# Patient Record
Sex: FEMALE | Race: WHITE | NOT HISPANIC OR LATINO | Employment: OTHER | URBAN - METROPOLITAN AREA
[De-identification: names, ages, dates, MRNs, and addresses within clinical notes are randomized per-mention and may not be internally consistent; named-entity substitution may affect disease eponyms.]

---

## 2017-05-02 ENCOUNTER — AMBULATORY - HEALTHEAST (OUTPATIENT)
Dept: INFUSION THERAPY | Facility: HOSPITAL | Age: 62
End: 2017-05-02

## 2017-05-02 ENCOUNTER — OFFICE VISIT - HEALTHEAST (OUTPATIENT)
Dept: ONCOLOGY | Facility: HOSPITAL | Age: 62
End: 2017-05-02

## 2017-05-02 DIAGNOSIS — C50.919 BREAST CANCER (H): ICD-10-CM

## 2017-05-02 ASSESSMENT — MIFFLIN-ST. JEOR: SCORE: 1241.12

## 2017-06-27 ENCOUNTER — RECORDS - HEALTHEAST (OUTPATIENT)
Dept: ADMINISTRATIVE | Facility: OTHER | Age: 62
End: 2017-06-27

## 2017-07-05 ENCOUNTER — RECORDS - HEALTHEAST (OUTPATIENT)
Dept: ADMINISTRATIVE | Facility: OTHER | Age: 62
End: 2017-07-05

## 2017-07-07 ENCOUNTER — RECORDS - HEALTHEAST (OUTPATIENT)
Dept: ADMINISTRATIVE | Facility: OTHER | Age: 62
End: 2017-07-07

## 2017-07-07 LAB
LAB AP CHARGES (HE HISTORICAL CONVERSION): ABNORMAL
PATH REPORT.ADDENDUM SPEC: ABNORMAL
PATH REPORT.COMMENTS IMP SPEC: ABNORMAL
PATH REPORT.COMMENTS IMP SPEC: ABNORMAL
PATH REPORT.FINAL DX SPEC: ABNORMAL
PATH REPORT.GROSS SPEC: ABNORMAL
PATH REPORT.MICROSCOPIC SPEC OTHER STN: ABNORMAL
PATH REPORT.MICROSCOPIC SPEC OTHER STN: ABNORMAL
PATH REPORT.RELEVANT HX SPEC: ABNORMAL
RESULT FLAG (HE HISTORICAL CONVERSION): ABNORMAL

## 2017-07-13 ENCOUNTER — RECORDS - HEALTHEAST (OUTPATIENT)
Dept: ADMINISTRATIVE | Facility: OTHER | Age: 62
End: 2017-07-13

## 2017-07-13 ENCOUNTER — RECORDS - HEALTHEAST (OUTPATIENT)
Dept: LAB | Facility: CLINIC | Age: 62
End: 2017-07-13

## 2017-07-13 ENCOUNTER — COMMUNICATION - HEALTHEAST (OUTPATIENT)
Dept: ONCOLOGY | Facility: HOSPITAL | Age: 62
End: 2017-07-13

## 2017-07-13 DIAGNOSIS — C50.911 BREAST CANCER, RIGHT (H): ICD-10-CM

## 2017-07-13 LAB
CHOLEST SERPL-MCNC: 166 MG/DL
FASTING STATUS PATIENT QL REPORTED: NORMAL
HDLC SERPL-MCNC: 55 MG/DL
LDLC SERPL CALC-MCNC: 96 MG/DL
TRIGL SERPL-MCNC: 77 MG/DL

## 2017-07-14 ENCOUNTER — OFFICE VISIT - HEALTHEAST (OUTPATIENT)
Dept: ONCOLOGY | Facility: HOSPITAL | Age: 62
End: 2017-07-14

## 2017-07-14 ENCOUNTER — RECORDS - HEALTHEAST (OUTPATIENT)
Dept: ADMINISTRATIVE | Facility: OTHER | Age: 62
End: 2017-07-14

## 2017-07-14 ENCOUNTER — AMBULATORY - HEALTHEAST (OUTPATIENT)
Dept: INFUSION THERAPY | Facility: HOSPITAL | Age: 62
End: 2017-07-14

## 2017-07-14 DIAGNOSIS — C50.919 BREAST CANCER (H): ICD-10-CM

## 2017-07-14 DIAGNOSIS — C50.911 BREAST CANCER, RIGHT (H): ICD-10-CM

## 2017-07-14 ASSESSMENT — MIFFLIN-ST. JEOR: SCORE: 1220.37

## 2017-07-17 ENCOUNTER — RECORDS - HEALTHEAST (OUTPATIENT)
Dept: ADMINISTRATIVE | Facility: OTHER | Age: 62
End: 2017-07-17

## 2017-07-18 ENCOUNTER — RECORDS - HEALTHEAST (OUTPATIENT)
Dept: ADMINISTRATIVE | Facility: OTHER | Age: 62
End: 2017-07-18

## 2017-07-18 ENCOUNTER — OFFICE VISIT - HEALTHEAST (OUTPATIENT)
Dept: SURGERY | Facility: CLINIC | Age: 62
End: 2017-07-18

## 2017-07-18 DIAGNOSIS — Z85.3 PERSONAL HISTORY OF BREAST CANCER: ICD-10-CM

## 2017-07-18 DIAGNOSIS — C50.411 MALIGNANT NEOPLASM OF UPPER-OUTER QUADRANT OF RIGHT FEMALE BREAST (H): ICD-10-CM

## 2017-07-21 ENCOUNTER — COMMUNICATION - HEALTHEAST (OUTPATIENT)
Dept: ADMINISTRATIVE | Facility: HOSPITAL | Age: 62
End: 2017-07-21

## 2017-07-21 LAB
BKR LAB AP ABNORMAL BLEEDING: NO
BKR LAB AP BIRTH CONTROL/HORMONES: NORMAL
BKR LAB AP CERVICAL APPEARANCE: NORMAL
BKR LAB AP GYN ADEQUACY: NORMAL
BKR LAB AP GYN INTERPRETATION: NORMAL
BKR LAB AP HPV REFLEX: NORMAL
BKR LAB AP LMP: NORMAL
BKR LAB AP PATIENT STATUS: NORMAL
BKR LAB AP PREVIOUS ABNORMAL: 1999
BKR LAB AP PREVIOUS NORMAL: NORMAL
HIGH RISK?: NO
PATH REPORT.COMMENTS IMP SPEC: NORMAL
RESULT FLAG (HE HISTORICAL CONVERSION): NORMAL

## 2017-07-25 ENCOUNTER — AMBULATORY - HEALTHEAST (OUTPATIENT)
Dept: SURGERY | Facility: CLINIC | Age: 62
End: 2017-07-25

## 2017-07-28 ENCOUNTER — COMMUNICATION - HEALTHEAST (OUTPATIENT)
Dept: ONCOLOGY | Facility: HOSPITAL | Age: 62
End: 2017-07-28

## 2017-07-31 ENCOUNTER — AMBULATORY - HEALTHEAST (OUTPATIENT)
Dept: ONCOLOGY | Facility: CLINIC | Age: 62
End: 2017-07-31

## 2017-07-31 DIAGNOSIS — C50.411 MALIGNANT NEOPLASM OF UPPER-OUTER QUADRANT OF RIGHT FEMALE BREAST (H): ICD-10-CM

## 2017-08-03 ENCOUNTER — HOSPITAL ENCOUNTER (OUTPATIENT)
Dept: MRI IMAGING | Facility: HOSPITAL | Age: 62
Discharge: HOME | End: 2017-08-03
Attending: INTERNAL MEDICINE

## 2017-08-03 DIAGNOSIS — C50.411 MALIGNANT NEOPLASM OF UPPER-OUTER QUADRANT OF RIGHT FEMALE BREAST (H): ICD-10-CM

## 2017-08-04 ENCOUNTER — RECORDS - HEALTHEAST (OUTPATIENT)
Dept: ADMINISTRATIVE | Facility: OTHER | Age: 62
End: 2017-08-04

## 2017-08-08 ENCOUNTER — AMBULATORY - HEALTHEAST (OUTPATIENT)
Dept: ONCOLOGY | Facility: HOSPITAL | Age: 62
End: 2017-08-08

## 2017-08-08 ENCOUNTER — AMBULATORY - HEALTHEAST (OUTPATIENT)
Dept: INFUSION THERAPY | Facility: HOSPITAL | Age: 62
End: 2017-08-08

## 2017-08-08 ENCOUNTER — OFFICE VISIT - HEALTHEAST (OUTPATIENT)
Dept: ONCOLOGY | Facility: HOSPITAL | Age: 62
End: 2017-08-08

## 2017-08-08 DIAGNOSIS — C50.919 BREAST CANCER (H): ICD-10-CM

## 2017-08-08 DIAGNOSIS — C50.411 MALIGNANT NEOPLASM OF UPPER-OUTER QUADRANT OF RIGHT FEMALE BREAST (H): ICD-10-CM

## 2017-08-08 ASSESSMENT — MIFFLIN-ST. JEOR: SCORE: 1218.56

## 2017-08-11 ENCOUNTER — HOSPITAL ENCOUNTER (OUTPATIENT)
Dept: CARDIOLOGY | Facility: HOSPITAL | Age: 62
Discharge: HOME | End: 2017-08-11
Attending: INTERNAL MEDICINE

## 2017-08-11 DIAGNOSIS — C50.411 MALIGNANT NEOPLASM OF UPPER-OUTER QUADRANT OF RIGHT FEMALE BREAST (H): ICD-10-CM

## 2017-08-11 LAB
AORTIC ROOT: 2.6 CM
AORTIC VALVE MEAN VELOCITY: 102 CM/S
ASCENDING AORTA: 3 CM
AV DIMENSIONLESS INDEX VTI: 0.6
AV MEAN GRADIENT: 5 MMHG
AV PEAK GRADIENT: 8 MMHG
AV VALVE AREA: 1.5 CM2
AV VELOCITY RATIO: 0.7
BSA FOR ECHO PROCEDURE: 1.78 M2
CV BLOOD PRESSURE: NORMAL MMHG
CV ECHO HEIGHT: 61 IN
CV ECHO WEIGHT: 163 LBS
DOP CALC AO PEAK VEL: 141 CM/S
DOP CALC AO VTI: 31.4 CM
DOP CALC LVOT AREA: 2.27 CM2
DOP CALC LVOT DIAMETER: 1.7 CM
DOP CALC LVOT PEAK VEL: 92.4 CM/S
DOP CALC LVOT STROKE VOLUME: 46.1 CM3
DOP CALCLVOT PEAK VEL VTI: 20.3 CM
EJECTION FRACTION: 61 % (ref 55–75)
FRACTIONAL SHORTENING: 33.1 % (ref 28–44)
INTERVENTRICULAR SEPTUM IN END DIASTOLE: 1.03 CM (ref 0.6–0.9)
IVS/PW RATIO: 1.1
LA AREA 1: 14.4 CM2
LA AREA 2: 12 CM2
LEFT ATRIUM LENGTH: 4.4 CM
LEFT ATRIUM SIZE: 2.9 CM
LEFT ATRIUM TO AORTIC ROOT RATIO: 1.12 NO UNITS
LEFT ATRIUM VOLUME INDEX: 18.8 ML/M2
LEFT ATRIUM VOLUME: 33.4 CM3
LEFT VENTRICLE CARDIAC INDEX: 2.1 L/MIN/M2
LEFT VENTRICLE CARDIAC OUTPUT: 3.7 L/MIN
LEFT VENTRICLE DIASTOLIC VOLUME INDEX: 35.3 CM3/M2 (ref 34–74)
LEFT VENTRICLE DIASTOLIC VOLUME: 62.8 CM3 (ref 46–106)
LEFT VENTRICLE HEART RATE: 80 BPM
LEFT VENTRICLE MASS INDEX: 67 G/M2
LEFT VENTRICLE SYSTOLIC VOLUME INDEX: 13.8 CM3/M2 (ref 11–31)
LEFT VENTRICLE SYSTOLIC VOLUME: 24.5 CM3 (ref 14–42)
LEFT VENTRICULAR INTERNAL DIMENSION IN DIASTOLE: 3.87 CM (ref 3.8–5.2)
LEFT VENTRICULAR INTERNAL DIMENSION IN SYSTOLE: 2.59 CM (ref 2.2–3.5)
LEFT VENTRICULAR MASS: 119.3 G
LEFT VENTRICULAR OUTFLOW TRACT MEAN GRADIENT: 2 MMHG
LEFT VENTRICULAR OUTFLOW TRACT MEAN VELOCITY: 63.7 CM/S
LEFT VENTRICULAR OUTFLOW TRACT PEAK GRADIENT: 3 MMHG
LEFT VENTRICULAR POSTERIOR WALL IN END DIASTOLE: 0.95 CM (ref 0.6–0.9)
LV STROKE VOLUME INDEX: 25.9 ML/M2
MITRAL VALVE E/A RATIO: 1.1
MV AVERAGE E/E' RATIO: 9.3 CM/S
MV DECELERATION TIME: 254 MS
MV E'TISSUE VEL-LAT: 10.5 CM/S
MV E'TISSUE VEL-MED: 8.99 CM/S
MV LATERAL E/E' RATIO: 8.6
MV MEDIAL E/E' RATIO: 10
MV PEAK A VELOCITY: 83.4 CM/S
MV PEAK E VELOCITY: 90.2 CM/S
NUC REST DIASTOLIC VOLUME INDEX: 2608 LBS
NUC REST SYSTOLIC VOLUME INDEX: 61 IN
TRICUSPID REGURGITATION PEAK PRESSURE GRADIENT: 25.8 MMHG
TRICUSPID VALVE ANULAR PLANE SYSTOLIC EXCURSION: 2 CM
TRICUSPID VALVE PEAK REGURGITANT VELOCITY: 254 CM/S

## 2017-08-11 ASSESSMENT — MIFFLIN-ST. JEOR: SCORE: 1216.74

## 2017-08-16 ENCOUNTER — INFUSION - HEALTHEAST (OUTPATIENT)
Dept: INFUSION THERAPY | Facility: HOSPITAL | Age: 62
End: 2017-08-16

## 2017-08-16 DIAGNOSIS — C50.411 MALIGNANT NEOPLASM OF UPPER-OUTER QUADRANT OF RIGHT FEMALE BREAST (H): ICD-10-CM

## 2017-08-18 ENCOUNTER — COMMUNICATION - HEALTHEAST (OUTPATIENT)
Dept: ONCOLOGY | Facility: HOSPITAL | Age: 62
End: 2017-08-18

## 2017-08-24 ENCOUNTER — COMMUNICATION - HEALTHEAST (OUTPATIENT)
Dept: ONCOLOGY | Facility: HOSPITAL | Age: 62
End: 2017-08-24

## 2017-08-24 ENCOUNTER — RECORDS - HEALTHEAST (OUTPATIENT)
Dept: ADMINISTRATIVE | Facility: OTHER | Age: 62
End: 2017-08-24

## 2017-08-30 ENCOUNTER — INFUSION - HEALTHEAST (OUTPATIENT)
Dept: INFUSION THERAPY | Facility: HOSPITAL | Age: 62
End: 2017-08-30

## 2017-08-30 ENCOUNTER — AMBULATORY - HEALTHEAST (OUTPATIENT)
Dept: INFUSION THERAPY | Facility: HOSPITAL | Age: 62
End: 2017-08-30

## 2017-08-30 ENCOUNTER — OFFICE VISIT - HEALTHEAST (OUTPATIENT)
Dept: ONCOLOGY | Facility: HOSPITAL | Age: 62
End: 2017-08-30

## 2017-08-30 DIAGNOSIS — R12 HEARTBURN: ICD-10-CM

## 2017-08-30 DIAGNOSIS — C50.911 MALIGNANT NEOPLASM OF RIGHT FEMALE BREAST (H): ICD-10-CM

## 2017-08-30 DIAGNOSIS — C50.411 MALIGNANT NEOPLASM OF UPPER-OUTER QUADRANT OF RIGHT FEMALE BREAST (H): ICD-10-CM

## 2017-08-30 DIAGNOSIS — R74.8 ELEVATED ALKALINE PHOSPHATASE LEVEL: ICD-10-CM

## 2017-09-14 ENCOUNTER — INFUSION - HEALTHEAST (OUTPATIENT)
Dept: INFUSION THERAPY | Facility: HOSPITAL | Age: 62
End: 2017-09-14

## 2017-09-14 ENCOUNTER — AMBULATORY - HEALTHEAST (OUTPATIENT)
Dept: INFUSION THERAPY | Facility: HOSPITAL | Age: 62
End: 2017-09-14

## 2017-09-14 ENCOUNTER — OFFICE VISIT - HEALTHEAST (OUTPATIENT)
Dept: ONCOLOGY | Facility: HOSPITAL | Age: 62
End: 2017-09-14

## 2017-09-14 DIAGNOSIS — C50.411 MALIGNANT NEOPLASM OF UPPER-OUTER QUADRANT OF RIGHT FEMALE BREAST (H): ICD-10-CM

## 2017-09-14 DIAGNOSIS — Z51.11 ENCOUNTER FOR CHEMOTHERAPY MANAGEMENT: ICD-10-CM

## 2017-09-14 DIAGNOSIS — K21.9 GERD WITHOUT ESOPHAGITIS: ICD-10-CM

## 2017-09-15 ENCOUNTER — COMMUNICATION - HEALTHEAST (OUTPATIENT)
Dept: ONCOLOGY | Facility: HOSPITAL | Age: 62
End: 2017-09-15

## 2017-09-26 ENCOUNTER — COMMUNICATION - HEALTHEAST (OUTPATIENT)
Dept: ONCOLOGY | Facility: HOSPITAL | Age: 62
End: 2017-09-26

## 2017-09-27 ENCOUNTER — AMBULATORY - HEALTHEAST (OUTPATIENT)
Dept: INFUSION THERAPY | Facility: HOSPITAL | Age: 62
End: 2017-09-27

## 2017-09-27 ENCOUNTER — COMMUNICATION - HEALTHEAST (OUTPATIENT)
Dept: ONCOLOGY | Facility: HOSPITAL | Age: 62
End: 2017-09-27

## 2017-09-27 ENCOUNTER — OFFICE VISIT - HEALTHEAST (OUTPATIENT)
Dept: ONCOLOGY | Facility: HOSPITAL | Age: 62
End: 2017-09-27

## 2017-09-27 ENCOUNTER — INFUSION - HEALTHEAST (OUTPATIENT)
Dept: INFUSION THERAPY | Facility: HOSPITAL | Age: 62
End: 2017-09-27

## 2017-09-27 DIAGNOSIS — Z23 INFLUENZA VACCINATION ADMINISTERED AT CURRENT VISIT: ICD-10-CM

## 2017-09-27 DIAGNOSIS — Z51.11 CHEMOTHERAPY MANAGEMENT, ENCOUNTER FOR: ICD-10-CM

## 2017-09-27 DIAGNOSIS — K21.9 GASTROESOPHAGEAL REFLUX DISEASE WITHOUT ESOPHAGITIS: ICD-10-CM

## 2017-09-27 DIAGNOSIS — C50.911 MALIGNANT NEOPLASM OF RIGHT FEMALE BREAST, UNSPECIFIED SITE OF BREAST: ICD-10-CM

## 2017-09-27 DIAGNOSIS — C50.411 MALIGNANT NEOPLASM OF UPPER-OUTER QUADRANT OF RIGHT FEMALE BREAST (H): ICD-10-CM

## 2017-09-28 ENCOUNTER — COMMUNICATION - HEALTHEAST (OUTPATIENT)
Dept: ONCOLOGY | Facility: HOSPITAL | Age: 62
End: 2017-09-28

## 2017-09-28 ENCOUNTER — INFUSION - HEALTHEAST (OUTPATIENT)
Dept: INFUSION THERAPY | Facility: HOSPITAL | Age: 62
End: 2017-09-28

## 2017-09-28 DIAGNOSIS — C50.411 MALIGNANT NEOPLASM OF UPPER-OUTER QUADRANT OF RIGHT FEMALE BREAST (H): ICD-10-CM

## 2017-09-29 ENCOUNTER — AMBULATORY - HEALTHEAST (OUTPATIENT)
Dept: INFUSION THERAPY | Facility: HOSPITAL | Age: 62
End: 2017-09-29

## 2017-09-29 DIAGNOSIS — Z20.1 EXPOSURE TO TB: ICD-10-CM

## 2017-10-03 ENCOUNTER — COMMUNICATION - HEALTHEAST (OUTPATIENT)
Dept: ONCOLOGY | Facility: HOSPITAL | Age: 62
End: 2017-10-03

## 2017-10-04 ENCOUNTER — AMBULATORY - HEALTHEAST (OUTPATIENT)
Dept: LAB | Facility: HOSPITAL | Age: 62
End: 2017-10-04

## 2017-10-04 ENCOUNTER — COMMUNICATION - HEALTHEAST (OUTPATIENT)
Dept: ONCOLOGY | Facility: HOSPITAL | Age: 62
End: 2017-10-04

## 2017-10-04 DIAGNOSIS — Z20.1 EXPOSURE TO TB: ICD-10-CM

## 2017-10-10 ENCOUNTER — COMMUNICATION - HEALTHEAST (OUTPATIENT)
Dept: INFUSION THERAPY | Facility: HOSPITAL | Age: 62
End: 2017-10-10

## 2017-10-10 DIAGNOSIS — Z20.1 EXPOSURE TO TB: ICD-10-CM

## 2017-10-11 ENCOUNTER — OFFICE VISIT - HEALTHEAST (OUTPATIENT)
Dept: ONCOLOGY | Facility: HOSPITAL | Age: 62
End: 2017-10-11

## 2017-10-11 ENCOUNTER — AMBULATORY - HEALTHEAST (OUTPATIENT)
Dept: INFUSION THERAPY | Facility: HOSPITAL | Age: 62
End: 2017-10-11

## 2017-10-11 ENCOUNTER — HOSPITAL ENCOUNTER (OUTPATIENT)
Dept: RADIOLOGY | Facility: HOSPITAL | Age: 62
Discharge: HOME | End: 2017-10-11
Attending: INTERNAL MEDICINE

## 2017-10-11 ENCOUNTER — HOSPITAL ENCOUNTER (OUTPATIENT)
Dept: CT IMAGING | Facility: HOSPITAL | Age: 62
Setting detail: RADIATION/ONCOLOGY SERIES
Discharge: STILL ADMITTED OR ADMINISTRATIVE | End: 2017-10-11
Attending: INTERNAL MEDICINE

## 2017-10-11 ENCOUNTER — INFUSION - HEALTHEAST (OUTPATIENT)
Dept: INFUSION THERAPY | Facility: HOSPITAL | Age: 62
End: 2017-10-11

## 2017-10-11 DIAGNOSIS — R50.9 FEVER: ICD-10-CM

## 2017-10-11 DIAGNOSIS — R05.9 COUGH: ICD-10-CM

## 2017-10-11 DIAGNOSIS — C50.411 MALIGNANT NEOPLASM OF UPPER-OUTER QUADRANT OF RIGHT FEMALE BREAST (H): ICD-10-CM

## 2017-10-11 DIAGNOSIS — Z17.1 MALIGNANT NEOPLASM OF UPPER-OUTER QUADRANT OF RIGHT BREAST IN FEMALE, ESTROGEN RECEPTOR NEGATIVE (H): ICD-10-CM

## 2017-10-11 DIAGNOSIS — T75.89XD EFFECTS OF EXPOSURE TO EXTERNAL CAUSE, SUBSEQUENT ENCOUNTER: ICD-10-CM

## 2017-10-11 DIAGNOSIS — C50.411 MALIGNANT NEOPLASM OF UPPER-OUTER QUADRANT OF RIGHT BREAST IN FEMALE, ESTROGEN RECEPTOR NEGATIVE (H): ICD-10-CM

## 2017-10-11 DIAGNOSIS — Z20.1 EXPOSURE TO TB: ICD-10-CM

## 2017-10-11 DIAGNOSIS — F41.9 ANXIETY: ICD-10-CM

## 2017-10-12 ENCOUNTER — COMMUNICATION - HEALTHEAST (OUTPATIENT)
Dept: ONCOLOGY | Facility: HOSPITAL | Age: 62
End: 2017-10-12

## 2017-10-12 ENCOUNTER — AMBULATORY - HEALTHEAST (OUTPATIENT)
Dept: ONCOLOGY | Facility: HOSPITAL | Age: 62
End: 2017-10-12

## 2017-10-12 DIAGNOSIS — R91.8 PULMONARY INFILTRATE: ICD-10-CM

## 2017-10-13 ENCOUNTER — AMBULATORY - HEALTHEAST (OUTPATIENT)
Dept: INFUSION THERAPY | Facility: HOSPITAL | Age: 62
End: 2017-10-13

## 2017-10-13 DIAGNOSIS — R91.8 PULMONARY INFILTRATE: ICD-10-CM

## 2017-10-17 ENCOUNTER — COMMUNICATION - HEALTHEAST (OUTPATIENT)
Dept: ONCOLOGY | Facility: HOSPITAL | Age: 62
End: 2017-10-17

## 2017-10-17 DIAGNOSIS — J81.1 PULMONARY EDEMA: ICD-10-CM

## 2017-10-18 ENCOUNTER — OFFICE VISIT - HEALTHEAST (OUTPATIENT)
Dept: PULMONOLOGY | Facility: OTHER | Age: 62
End: 2017-10-18

## 2017-10-18 DIAGNOSIS — R91.8 PULMONARY INFILTRATES: ICD-10-CM

## 2017-10-19 ENCOUNTER — SURGERY - HEALTHEAST (OUTPATIENT)
Dept: PULMONOLOGY | Facility: OTHER | Age: 62
End: 2017-10-19

## 2017-10-19 ENCOUNTER — AMBULATORY - HEALTHEAST (OUTPATIENT)
Dept: PULMONOLOGY | Facility: OTHER | Age: 62
End: 2017-10-19

## 2017-10-19 DIAGNOSIS — R91.8 PULMONARY INFILTRATES: ICD-10-CM

## 2017-10-22 ENCOUNTER — COMMUNICATION - HEALTHEAST (OUTPATIENT)
Dept: SCHEDULING | Facility: CLINIC | Age: 62
End: 2017-10-22

## 2017-10-23 ENCOUNTER — AMBULATORY - HEALTHEAST (OUTPATIENT)
Dept: ONCOLOGY | Facility: HOSPITAL | Age: 62
End: 2017-10-23

## 2017-10-23 ENCOUNTER — COMMUNICATION - HEALTHEAST (OUTPATIENT)
Dept: ONCOLOGY | Facility: HOSPITAL | Age: 62
End: 2017-10-23

## 2017-10-23 ENCOUNTER — COMMUNICATION - HEALTHEAST (OUTPATIENT)
Dept: PULMONOLOGY | Facility: OTHER | Age: 62
End: 2017-10-23

## 2017-10-26 ENCOUNTER — OFFICE VISIT - HEALTHEAST (OUTPATIENT)
Dept: PULMONOLOGY | Facility: OTHER | Age: 62
End: 2017-10-26

## 2017-10-26 DIAGNOSIS — T45.1X5A: ICD-10-CM

## 2017-10-26 DIAGNOSIS — J84.9: ICD-10-CM

## 2017-10-30 ENCOUNTER — COMMUNICATION - HEALTHEAST (OUTPATIENT)
Dept: ONCOLOGY | Facility: HOSPITAL | Age: 62
End: 2017-10-30

## 2017-11-01 ENCOUNTER — INFUSION - HEALTHEAST (OUTPATIENT)
Dept: INFUSION THERAPY | Facility: HOSPITAL | Age: 62
End: 2017-11-01

## 2017-11-01 ENCOUNTER — OFFICE VISIT - HEALTHEAST (OUTPATIENT)
Dept: ONCOLOGY | Facility: HOSPITAL | Age: 62
End: 2017-11-01

## 2017-11-01 ENCOUNTER — AMBULATORY - HEALTHEAST (OUTPATIENT)
Dept: INFUSION THERAPY | Facility: HOSPITAL | Age: 62
End: 2017-11-01

## 2017-11-01 DIAGNOSIS — J84.9: ICD-10-CM

## 2017-11-01 DIAGNOSIS — R91.8 PULMONARY INFILTRATES: ICD-10-CM

## 2017-11-01 DIAGNOSIS — Z17.1 MALIGNANT NEOPLASM OF UPPER-OUTER QUADRANT OF RIGHT BREAST IN FEMALE, ESTROGEN RECEPTOR NEGATIVE (H): ICD-10-CM

## 2017-11-01 DIAGNOSIS — C50.411 MALIGNANT NEOPLASM OF UPPER-OUTER QUADRANT OF RIGHT FEMALE BREAST, UNSPECIFIED ESTROGEN RECEPTOR STATUS (H): ICD-10-CM

## 2017-11-01 DIAGNOSIS — C50.911 MALIGNANT NEOPLASM OF RIGHT FEMALE BREAST (H): ICD-10-CM

## 2017-11-01 DIAGNOSIS — T45.1X5A: ICD-10-CM

## 2017-11-01 DIAGNOSIS — C50.411 MALIGNANT NEOPLASM OF UPPER-OUTER QUADRANT OF RIGHT BREAST IN FEMALE, ESTROGEN RECEPTOR NEGATIVE (H): ICD-10-CM

## 2017-11-01 DIAGNOSIS — T75.89XD EFFECTS OF EXPOSURE TO EXTERNAL CAUSE, SUBSEQUENT ENCOUNTER: ICD-10-CM

## 2017-11-03 ENCOUNTER — COMMUNICATION - HEALTHEAST (OUTPATIENT)
Dept: ADMINISTRATIVE | Facility: HOSPITAL | Age: 62
End: 2017-11-03

## 2017-11-07 ENCOUNTER — COMMUNICATION - HEALTHEAST (OUTPATIENT)
Dept: ONCOLOGY | Facility: HOSPITAL | Age: 62
End: 2017-11-07

## 2017-11-08 ENCOUNTER — INFUSION - HEALTHEAST (OUTPATIENT)
Dept: INFUSION THERAPY | Facility: HOSPITAL | Age: 62
End: 2017-11-08

## 2017-11-08 DIAGNOSIS — C50.911 MALIGNANT NEOPLASM OF RIGHT FEMALE BREAST (H): ICD-10-CM

## 2017-11-13 ENCOUNTER — COMMUNICATION - HEALTHEAST (OUTPATIENT)
Dept: ONCOLOGY | Facility: HOSPITAL | Age: 62
End: 2017-11-13

## 2017-11-14 ENCOUNTER — OFFICE VISIT - HEALTHEAST (OUTPATIENT)
Dept: ONCOLOGY | Facility: HOSPITAL | Age: 62
End: 2017-11-14

## 2017-11-14 DIAGNOSIS — F41.9 ANXIETY: ICD-10-CM

## 2017-11-14 DIAGNOSIS — C50.411 MALIGNANT NEOPLASM OF UPPER-OUTER QUADRANT OF RIGHT BREAST IN FEMALE, ESTROGEN RECEPTOR NEGATIVE (H): ICD-10-CM

## 2017-11-14 DIAGNOSIS — Z17.1 MALIGNANT NEOPLASM OF UPPER-OUTER QUADRANT OF RIGHT BREAST IN FEMALE, ESTROGEN RECEPTOR NEGATIVE (H): ICD-10-CM

## 2017-11-15 ENCOUNTER — AMBULATORY - HEALTHEAST (OUTPATIENT)
Dept: INFUSION THERAPY | Facility: HOSPITAL | Age: 62
End: 2017-11-15

## 2017-11-15 ENCOUNTER — INFUSION - HEALTHEAST (OUTPATIENT)
Dept: INFUSION THERAPY | Facility: HOSPITAL | Age: 62
End: 2017-11-15

## 2017-11-15 ENCOUNTER — COMMUNICATION - HEALTHEAST (OUTPATIENT)
Dept: ONCOLOGY | Facility: HOSPITAL | Age: 62
End: 2017-11-15

## 2017-11-15 ENCOUNTER — OFFICE VISIT - HEALTHEAST (OUTPATIENT)
Dept: ONCOLOGY | Facility: HOSPITAL | Age: 62
End: 2017-11-15

## 2017-11-15 DIAGNOSIS — T45.1X5A ANEMIA ASSOCIATED WITH CHEMOTHERAPY: ICD-10-CM

## 2017-11-15 DIAGNOSIS — C50.912 MALIGNANT NEOPLASM OF LEFT BREAST IN FEMALE, ESTROGEN RECEPTOR NEGATIVE, UNSPECIFIED SITE OF BREAST (H): ICD-10-CM

## 2017-11-15 DIAGNOSIS — Z71.83 ENCOUNTER FOR NONPROCREATIVE GENETIC COUNSELING: ICD-10-CM

## 2017-11-15 DIAGNOSIS — Z83.719 FAMILY HISTORY OF COLONIC POLYPS: ICD-10-CM

## 2017-11-15 DIAGNOSIS — C50.911 MALIGNANT NEOPLASM OF RIGHT FEMALE BREAST (H): ICD-10-CM

## 2017-11-15 DIAGNOSIS — Z80.0 FAMILY HISTORY OF COLON CANCER: ICD-10-CM

## 2017-11-15 DIAGNOSIS — Z80.41 FAMILY HISTORY OF OVARIAN CANCER: ICD-10-CM

## 2017-11-15 DIAGNOSIS — D64.81 ANEMIA ASSOCIATED WITH CHEMOTHERAPY: ICD-10-CM

## 2017-11-15 DIAGNOSIS — Z17.1 MALIGNANT NEOPLASM OF RIGHT BREAST IN FEMALE, ESTROGEN RECEPTOR NEGATIVE, UNSPECIFIED SITE OF BREAST (H): ICD-10-CM

## 2017-11-15 DIAGNOSIS — T75.89XD EFFECTS OF EXPOSURE TO EXTERNAL CAUSE, SUBSEQUENT ENCOUNTER: ICD-10-CM

## 2017-11-15 DIAGNOSIS — B37.0 ORAL YEAST INFECTION: ICD-10-CM

## 2017-11-15 DIAGNOSIS — C50.411 MALIGNANT NEOPLASM OF UPPER-OUTER QUADRANT OF RIGHT BREAST IN FEMALE, ESTROGEN RECEPTOR NEGATIVE (H): ICD-10-CM

## 2017-11-15 DIAGNOSIS — C50.911 MALIGNANT NEOPLASM OF RIGHT BREAST IN FEMALE, ESTROGEN RECEPTOR NEGATIVE, UNSPECIFIED SITE OF BREAST (H): ICD-10-CM

## 2017-11-15 DIAGNOSIS — Z13.79 GENETIC TESTING: ICD-10-CM

## 2017-11-15 DIAGNOSIS — T45.1X5A: ICD-10-CM

## 2017-11-15 DIAGNOSIS — Z17.1 MALIGNANT NEOPLASM OF LEFT BREAST IN FEMALE, ESTROGEN RECEPTOR NEGATIVE, UNSPECIFIED SITE OF BREAST (H): ICD-10-CM

## 2017-11-15 DIAGNOSIS — J84.9: ICD-10-CM

## 2017-11-15 DIAGNOSIS — Z17.1 MALIGNANT NEOPLASM OF UPPER-OUTER QUADRANT OF RIGHT BREAST IN FEMALE, ESTROGEN RECEPTOR NEGATIVE (H): ICD-10-CM

## 2017-11-15 DIAGNOSIS — Z80.49 FAMILY HISTORY OF UTERINE CANCER: ICD-10-CM

## 2017-11-21 ENCOUNTER — INFUSION - HEALTHEAST (OUTPATIENT)
Dept: INFUSION THERAPY | Facility: HOSPITAL | Age: 62
End: 2017-11-21

## 2017-11-21 DIAGNOSIS — C50.911 MALIGNANT NEOPLASM OF RIGHT FEMALE BREAST (H): ICD-10-CM

## 2017-11-22 ENCOUNTER — RECORDS - HEALTHEAST (OUTPATIENT)
Dept: ADMINISTRATIVE | Facility: OTHER | Age: 62
End: 2017-11-22

## 2017-11-27 ENCOUNTER — COMMUNICATION - HEALTHEAST (OUTPATIENT)
Dept: ONCOLOGY | Facility: HOSPITAL | Age: 62
End: 2017-11-27

## 2017-11-27 DIAGNOSIS — B99.9 INFECTION: ICD-10-CM

## 2017-11-29 ENCOUNTER — INFUSION - HEALTHEAST (OUTPATIENT)
Dept: INFUSION THERAPY | Facility: HOSPITAL | Age: 62
End: 2017-11-29

## 2017-11-29 DIAGNOSIS — C50.911 MALIGNANT NEOPLASM OF RIGHT FEMALE BREAST (H): ICD-10-CM

## 2017-12-01 ENCOUNTER — COMMUNICATION - HEALTHEAST (OUTPATIENT)
Dept: ONCOLOGY | Facility: HOSPITAL | Age: 62
End: 2017-12-01

## 2017-12-06 ENCOUNTER — AMBULATORY - HEALTHEAST (OUTPATIENT)
Dept: INFUSION THERAPY | Facility: HOSPITAL | Age: 62
End: 2017-12-06

## 2017-12-06 ENCOUNTER — OFFICE VISIT - HEALTHEAST (OUTPATIENT)
Dept: ONCOLOGY | Facility: HOSPITAL | Age: 62
End: 2017-12-06

## 2017-12-06 DIAGNOSIS — C50.911 MALIGNANT NEOPLASM OF RIGHT FEMALE BREAST (H): ICD-10-CM

## 2017-12-06 DIAGNOSIS — T45.1X5A CHEMOTHERAPY-INDUCED PERIPHERAL NEUROPATHY (H): ICD-10-CM

## 2017-12-06 DIAGNOSIS — T45.1X5A: ICD-10-CM

## 2017-12-06 DIAGNOSIS — L27.0 DRUG RASH: ICD-10-CM

## 2017-12-06 DIAGNOSIS — G62.0 CHEMOTHERAPY-INDUCED PERIPHERAL NEUROPATHY (H): ICD-10-CM

## 2017-12-06 DIAGNOSIS — B37.2 CANDIDIASIS OF SKIN: ICD-10-CM

## 2017-12-06 DIAGNOSIS — Z51.11 CHEMOTHERAPY MANAGEMENT, ENCOUNTER FOR: ICD-10-CM

## 2017-12-06 DIAGNOSIS — J84.9: ICD-10-CM

## 2017-12-13 ENCOUNTER — INFUSION - HEALTHEAST (OUTPATIENT)
Dept: INFUSION THERAPY | Facility: HOSPITAL | Age: 62
End: 2017-12-13

## 2017-12-13 ENCOUNTER — AMBULATORY - HEALTHEAST (OUTPATIENT)
Dept: INFUSION THERAPY | Facility: HOSPITAL | Age: 62
End: 2017-12-13

## 2017-12-13 ENCOUNTER — OFFICE VISIT - HEALTHEAST (OUTPATIENT)
Dept: ONCOLOGY | Facility: HOSPITAL | Age: 62
End: 2017-12-13

## 2017-12-13 DIAGNOSIS — G62.0 NEUROPATHY DUE TO CHEMOTHERAPEUTIC DRUG (H): ICD-10-CM

## 2017-12-13 DIAGNOSIS — C50.911 MALIGNANT NEOPLASM OF RIGHT FEMALE BREAST (H): ICD-10-CM

## 2017-12-13 DIAGNOSIS — T45.1X5A NEUROPATHY DUE TO CHEMOTHERAPEUTIC DRUG (H): ICD-10-CM

## 2017-12-13 DIAGNOSIS — D64.81 ANEMIA ASSOCIATED WITH CHEMOTHERAPY: ICD-10-CM

## 2017-12-13 DIAGNOSIS — T45.1X5A ANEMIA ASSOCIATED WITH CHEMOTHERAPY: ICD-10-CM

## 2017-12-19 ENCOUNTER — OFFICE VISIT - HEALTHEAST (OUTPATIENT)
Dept: PULMONOLOGY | Facility: OTHER | Age: 62
End: 2017-12-19

## 2017-12-19 ENCOUNTER — HOSPITAL ENCOUNTER (OUTPATIENT)
Dept: CT IMAGING | Facility: HOSPITAL | Age: 62
Discharge: HOME | End: 2017-12-19
Attending: INTERNAL MEDICINE

## 2017-12-19 DIAGNOSIS — T45.1X5A: ICD-10-CM

## 2017-12-19 DIAGNOSIS — J84.9: ICD-10-CM

## 2017-12-20 ENCOUNTER — INFUSION - HEALTHEAST (OUTPATIENT)
Dept: INFUSION THERAPY | Facility: HOSPITAL | Age: 62
End: 2017-12-20

## 2017-12-20 DIAGNOSIS — C50.911 MALIGNANT NEOPLASM OF RIGHT FEMALE BREAST (H): ICD-10-CM

## 2017-12-27 ENCOUNTER — INFUSION - HEALTHEAST (OUTPATIENT)
Dept: INFUSION THERAPY | Facility: HOSPITAL | Age: 62
End: 2017-12-27

## 2017-12-27 DIAGNOSIS — C50.911 MALIGNANT NEOPLASM OF RIGHT FEMALE BREAST (H): ICD-10-CM

## 2018-01-03 ENCOUNTER — AMBULATORY - HEALTHEAST (OUTPATIENT)
Dept: INFUSION THERAPY | Facility: HOSPITAL | Age: 63
End: 2018-01-03

## 2018-01-03 ENCOUNTER — INFUSION - HEALTHEAST (OUTPATIENT)
Dept: INFUSION THERAPY | Facility: HOSPITAL | Age: 63
End: 2018-01-03

## 2018-01-03 ENCOUNTER — OFFICE VISIT - HEALTHEAST (OUTPATIENT)
Dept: ONCOLOGY | Facility: HOSPITAL | Age: 63
End: 2018-01-03

## 2018-01-03 DIAGNOSIS — C50.911 MALIGNANT NEOPLASM OF RIGHT FEMALE BREAST (H): ICD-10-CM

## 2018-01-03 LAB
ALBUMIN SERPL-MCNC: 3.3 G/DL (ref 3.5–5)
ALP SERPL-CCNC: 95 U/L (ref 45–120)
ALT SERPL W P-5'-P-CCNC: 18 U/L (ref 0–45)
ANION GAP SERPL CALCULATED.3IONS-SCNC: 8 MMOL/L (ref 5–18)
AST SERPL W P-5'-P-CCNC: 35 U/L (ref 0–40)
BASOPHILS # BLD AUTO: 0 THOU/UL (ref 0–0.2)
BASOPHILS NFR BLD AUTO: 0 % (ref 0–2)
BILIRUB SERPL-MCNC: 0.3 MG/DL (ref 0–1)
BUN SERPL-MCNC: 12 MG/DL (ref 8–22)
CALCIUM SERPL-MCNC: 9 MG/DL (ref 8.5–10.5)
CHLORIDE BLD-SCNC: 105 MMOL/L (ref 98–107)
CO2 SERPL-SCNC: 28 MMOL/L (ref 22–31)
CREAT SERPL-MCNC: 0.93 MG/DL (ref 0.6–1.1)
EOSINOPHIL # BLD AUTO: 0.1 THOU/UL (ref 0–0.4)
EOSINOPHIL NFR BLD AUTO: 1 % (ref 0–6)
ERYTHROCYTE [DISTWIDTH] IN BLOOD BY AUTOMATED COUNT: 18.4 % (ref 11–14.5)
GFR SERPL CREATININE-BSD FRML MDRD: >60 ML/MIN/1.73M2
GLUCOSE BLD-MCNC: 122 MG/DL (ref 70–125)
HCT VFR BLD AUTO: 34 % (ref 35–47)
HGB BLD-MCNC: 10.9 G/DL (ref 12–16)
LYMPHOCYTES # BLD AUTO: 1 THOU/UL (ref 0.8–4.4)
LYMPHOCYTES NFR BLD AUTO: 15 % (ref 20–40)
MCH RBC QN AUTO: 30.5 PG (ref 27–34)
MCHC RBC AUTO-ENTMCNC: 32.1 G/DL (ref 32–36)
MCV RBC AUTO: 95 FL (ref 80–100)
MONOCYTES # BLD AUTO: 0.5 THOU/UL (ref 0–0.9)
MONOCYTES NFR BLD AUTO: 8 % (ref 2–10)
NEUTROPHILS # BLD AUTO: 5.3 THOU/UL (ref 2–7.7)
NEUTROPHILS NFR BLD AUTO: 76 % (ref 50–70)
PLATELET # BLD AUTO: 358 THOU/UL (ref 140–440)
PMV BLD AUTO: 9.6 FL (ref 8.5–12.5)
POTASSIUM BLD-SCNC: 4.2 MMOL/L (ref 3.5–5)
PROT SERPL-MCNC: 6.8 G/DL (ref 6–8)
RBC # BLD AUTO: 3.57 MILL/UL (ref 3.8–5.4)
SODIUM SERPL-SCNC: 141 MMOL/L (ref 136–145)
WBC: 7.1 THOU/UL (ref 4–11)

## 2018-01-08 ENCOUNTER — HOSPITAL ENCOUNTER (OUTPATIENT)
Dept: MRI IMAGING | Facility: HOSPITAL | Age: 63
Setting detail: RADIATION/ONCOLOGY SERIES
Discharge: STILL ADMITTED OR ADMINISTRATIVE | End: 2018-01-08
Attending: INTERNAL MEDICINE

## 2018-01-08 DIAGNOSIS — C50.911 MALIGNANT NEOPLASM OF RIGHT FEMALE BREAST (H): ICD-10-CM

## 2018-01-10 ENCOUNTER — INFUSION - HEALTHEAST (OUTPATIENT)
Dept: INFUSION THERAPY | Facility: HOSPITAL | Age: 63
End: 2018-01-10

## 2018-01-10 DIAGNOSIS — C50.911 MALIGNANT NEOPLASM OF RIGHT FEMALE BREAST (H): ICD-10-CM

## 2018-01-10 LAB
BASOPHILS # BLD AUTO: 0 THOU/UL (ref 0–0.2)
BASOPHILS NFR BLD AUTO: 1 % (ref 0–2)
EOSINOPHIL # BLD AUTO: 0.1 THOU/UL (ref 0–0.4)
EOSINOPHIL NFR BLD AUTO: 1 % (ref 0–6)
ERYTHROCYTE [DISTWIDTH] IN BLOOD BY AUTOMATED COUNT: 18.3 % (ref 11–14.5)
HCT VFR BLD AUTO: 34.4 % (ref 35–47)
HGB BLD-MCNC: 11.1 G/DL (ref 12–16)
LYMPHOCYTES # BLD AUTO: 1.1 THOU/UL (ref 0.8–4.4)
LYMPHOCYTES NFR BLD AUTO: 15 % (ref 20–40)
MCH RBC QN AUTO: 30.2 PG (ref 27–34)
MCHC RBC AUTO-ENTMCNC: 32.3 G/DL (ref 32–36)
MCV RBC AUTO: 94 FL (ref 80–100)
MONOCYTES # BLD AUTO: 0.6 THOU/UL (ref 0–0.9)
MONOCYTES NFR BLD AUTO: 8 % (ref 2–10)
NEUTROPHILS # BLD AUTO: 5.6 THOU/UL (ref 2–7.7)
NEUTROPHILS NFR BLD AUTO: 76 % (ref 50–70)
PLATELET # BLD AUTO: 341 THOU/UL (ref 140–440)
PMV BLD AUTO: 9.6 FL (ref 8.5–12.5)
RBC # BLD AUTO: 3.67 MILL/UL (ref 3.8–5.4)
WBC: 7.5 THOU/UL (ref 4–11)

## 2018-01-17 ENCOUNTER — INFUSION - HEALTHEAST (OUTPATIENT)
Dept: INFUSION THERAPY | Facility: HOSPITAL | Age: 63
End: 2018-01-17

## 2018-01-17 DIAGNOSIS — C50.911 MALIGNANT NEOPLASM OF RIGHT FEMALE BREAST (H): ICD-10-CM

## 2018-01-17 LAB
BASOPHILS # BLD AUTO: 0 THOU/UL (ref 0–0.2)
BASOPHILS NFR BLD AUTO: 0 % (ref 0–2)
EOSINOPHIL # BLD AUTO: 0.1 THOU/UL (ref 0–0.4)
EOSINOPHIL NFR BLD AUTO: 1 % (ref 0–6)
ERYTHROCYTE [DISTWIDTH] IN BLOOD BY AUTOMATED COUNT: 18.3 % (ref 11–14.5)
HCT VFR BLD AUTO: 33.7 % (ref 35–47)
HGB BLD-MCNC: 10.9 G/DL (ref 12–16)
LYMPHOCYTES # BLD AUTO: 1 THOU/UL (ref 0.8–4.4)
LYMPHOCYTES NFR BLD AUTO: 15 % (ref 20–40)
MCH RBC QN AUTO: 30.3 PG (ref 27–34)
MCHC RBC AUTO-ENTMCNC: 32.3 G/DL (ref 32–36)
MCV RBC AUTO: 94 FL (ref 80–100)
MONOCYTES # BLD AUTO: 0.6 THOU/UL (ref 0–0.9)
MONOCYTES NFR BLD AUTO: 9 % (ref 2–10)
NEUTROPHILS # BLD AUTO: 4.8 THOU/UL (ref 2–7.7)
NEUTROPHILS NFR BLD AUTO: 75 % (ref 50–70)
PLATELET # BLD AUTO: 320 THOU/UL (ref 140–440)
PMV BLD AUTO: 9.7 FL (ref 8.5–12.5)
RBC # BLD AUTO: 3.6 MILL/UL (ref 3.8–5.4)
WBC: 6.4 THOU/UL (ref 4–11)

## 2018-01-23 ENCOUNTER — OFFICE VISIT - HEALTHEAST (OUTPATIENT)
Dept: SURGERY | Facility: CLINIC | Age: 63
End: 2018-01-23

## 2018-01-23 DIAGNOSIS — C50.411 MALIGNANT NEOPLASM OF UPPER-OUTER QUADRANT OF RIGHT BREAST IN FEMALE, ESTROGEN RECEPTOR NEGATIVE (H): ICD-10-CM

## 2018-01-23 DIAGNOSIS — Z17.1 MALIGNANT NEOPLASM OF UPPER-OUTER QUADRANT OF RIGHT BREAST IN FEMALE, ESTROGEN RECEPTOR NEGATIVE (H): ICD-10-CM

## 2018-01-23 ASSESSMENT — MIFFLIN-ST. JEOR: SCORE: 1239.42

## 2018-02-02 ENCOUNTER — OFFICE VISIT - HEALTHEAST (OUTPATIENT)
Dept: ONCOLOGY | Facility: HOSPITAL | Age: 63
End: 2018-02-02

## 2018-02-02 DIAGNOSIS — G62.0 CHEMOTHERAPY-INDUCED PERIPHERAL NEUROPATHY (H): ICD-10-CM

## 2018-02-02 DIAGNOSIS — C50.411 MALIGNANT NEOPLASM OF UPPER-OUTER QUADRANT OF RIGHT BREAST IN FEMALE, ESTROGEN RECEPTOR NEGATIVE (H): ICD-10-CM

## 2018-02-02 DIAGNOSIS — C50.911 MALIGNANT NEOPLASM OF RIGHT FEMALE BREAST (H): ICD-10-CM

## 2018-02-02 DIAGNOSIS — T45.1X5A CHEMOTHERAPY-INDUCED PERIPHERAL NEUROPATHY (H): ICD-10-CM

## 2018-02-02 DIAGNOSIS — Z17.1 MALIGNANT NEOPLASM OF UPPER-OUTER QUADRANT OF RIGHT BREAST IN FEMALE, ESTROGEN RECEPTOR NEGATIVE (H): ICD-10-CM

## 2018-02-08 ENCOUNTER — COMMUNICATION - HEALTHEAST (OUTPATIENT)
Dept: ONCOLOGY | Facility: HOSPITAL | Age: 63
End: 2018-02-08

## 2018-02-08 ASSESSMENT — MIFFLIN-ST. JEOR: SCORE: 1216.74

## 2018-02-12 ENCOUNTER — HOSPITAL ENCOUNTER (OUTPATIENT)
Dept: MAMMOGRAPHY | Facility: HOSPITAL | Age: 63
Discharge: HOME | End: 2018-02-12
Attending: SPECIALIST | Admitting: RADIOLOGY

## 2018-02-12 ENCOUNTER — ANESTHESIA - HEALTHEAST (OUTPATIENT)
Dept: SURGERY | Facility: HOSPITAL | Age: 63
End: 2018-02-12

## 2018-02-12 ENCOUNTER — HOSPITAL ENCOUNTER (OUTPATIENT)
Dept: NUCLEAR MEDICINE | Facility: HOSPITAL | Age: 63
Discharge: HOME | End: 2018-02-12
Attending: SPECIALIST

## 2018-02-12 ENCOUNTER — SURGERY - HEALTHEAST (OUTPATIENT)
Dept: SURGERY | Facility: HOSPITAL | Age: 63
End: 2018-02-12

## 2018-02-12 DIAGNOSIS — Z17.1 MALIGNANT NEOPLASM OF UPPER-OUTER QUADRANT OF RIGHT BREAST IN FEMALE, ESTROGEN RECEPTOR NEGATIVE (H): ICD-10-CM

## 2018-02-12 DIAGNOSIS — C50.411 MALIGNANT NEOPLASM OF UPPER-OUTER QUADRANT OF RIGHT BREAST IN FEMALE, ESTROGEN RECEPTOR NEGATIVE (H): ICD-10-CM

## 2018-02-14 ENCOUNTER — HOSPITAL ENCOUNTER (OUTPATIENT)
Dept: MAMMOGRAPHY | Facility: HOSPITAL | Age: 63
Discharge: HOME | End: 2018-02-14
Attending: SPECIALIST

## 2018-02-14 DIAGNOSIS — C50.919 BREAST CA (H): ICD-10-CM

## 2018-02-22 ENCOUNTER — OFFICE VISIT - HEALTHEAST (OUTPATIENT)
Dept: SURGERY | Facility: CLINIC | Age: 63
End: 2018-02-22

## 2018-02-22 DIAGNOSIS — C50.411 MALIGNANT NEOPLASM OF UPPER-OUTER QUADRANT OF RIGHT BREAST IN FEMALE, ESTROGEN RECEPTOR NEGATIVE (H): ICD-10-CM

## 2018-02-22 DIAGNOSIS — Z85.3 PERSONAL HISTORY OF BREAST CANCER: ICD-10-CM

## 2018-02-22 DIAGNOSIS — Z17.1 MALIGNANT NEOPLASM OF UPPER-OUTER QUADRANT OF RIGHT BREAST IN FEMALE, ESTROGEN RECEPTOR NEGATIVE (H): ICD-10-CM

## 2018-03-09 ENCOUNTER — OFFICE VISIT - HEALTHEAST (OUTPATIENT)
Dept: ONCOLOGY | Facility: HOSPITAL | Age: 63
End: 2018-03-09

## 2018-03-09 DIAGNOSIS — C50.411 MALIGNANT NEOPLASM OF UPPER-OUTER QUADRANT OF RIGHT BREAST IN FEMALE, ESTROGEN RECEPTOR NEGATIVE (H): ICD-10-CM

## 2018-03-09 DIAGNOSIS — Z17.1 MALIGNANT NEOPLASM OF UPPER-OUTER QUADRANT OF RIGHT BREAST IN FEMALE, ESTROGEN RECEPTOR NEGATIVE (H): ICD-10-CM

## 2018-03-19 ENCOUNTER — AMBULATORY - HEALTHEAST (OUTPATIENT)
Dept: RADIATION ONCOLOGY | Facility: HOSPITAL | Age: 63
End: 2018-03-19

## 2018-03-19 ENCOUNTER — OFFICE VISIT - HEALTHEAST (OUTPATIENT)
Dept: RADIATION ONCOLOGY | Facility: HOSPITAL | Age: 63
End: 2018-03-19

## 2018-03-19 DIAGNOSIS — Z17.1 MALIGNANT NEOPLASM OF UPPER-OUTER QUADRANT OF RIGHT BREAST IN FEMALE, ESTROGEN RECEPTOR NEGATIVE (H): ICD-10-CM

## 2018-03-19 DIAGNOSIS — C50.411 MALIGNANT NEOPLASM OF UPPER-OUTER QUADRANT OF RIGHT BREAST IN FEMALE, ESTROGEN RECEPTOR NEGATIVE (H): ICD-10-CM

## 2018-03-19 ASSESSMENT — MIFFLIN-ST. JEOR: SCORE: 1233.07

## 2018-03-28 ENCOUNTER — OFFICE VISIT - HEALTHEAST (OUTPATIENT)
Dept: RADIATION ONCOLOGY | Facility: CLINIC | Age: 63
End: 2018-03-28

## 2018-03-28 DIAGNOSIS — Z17.1 MALIGNANT NEOPLASM OF UPPER-OUTER QUADRANT OF RIGHT BREAST IN FEMALE, ESTROGEN RECEPTOR NEGATIVE (H): ICD-10-CM

## 2018-03-28 DIAGNOSIS — C50.411 MALIGNANT NEOPLASM OF UPPER-OUTER QUADRANT OF RIGHT BREAST IN FEMALE, ESTROGEN RECEPTOR NEGATIVE (H): ICD-10-CM

## 2018-04-04 ENCOUNTER — OFFICE VISIT - HEALTHEAST (OUTPATIENT)
Dept: RADIATION ONCOLOGY | Facility: CLINIC | Age: 63
End: 2018-04-04

## 2018-04-04 DIAGNOSIS — Z17.1 MALIGNANT NEOPLASM OF UPPER-OUTER QUADRANT OF RIGHT BREAST IN FEMALE, ESTROGEN RECEPTOR NEGATIVE (H): ICD-10-CM

## 2018-04-04 DIAGNOSIS — C50.411 MALIGNANT NEOPLASM OF UPPER-OUTER QUADRANT OF RIGHT BREAST IN FEMALE, ESTROGEN RECEPTOR NEGATIVE (H): ICD-10-CM

## 2018-04-04 ASSESSMENT — MIFFLIN-ST. JEOR: SCORE: 1228.54

## 2018-04-11 ENCOUNTER — OFFICE VISIT - HEALTHEAST (OUTPATIENT)
Dept: RADIATION ONCOLOGY | Facility: CLINIC | Age: 63
End: 2018-04-11

## 2018-04-11 DIAGNOSIS — C50.411 MALIGNANT NEOPLASM OF UPPER-OUTER QUADRANT OF RIGHT BREAST IN FEMALE, ESTROGEN RECEPTOR NEGATIVE (H): ICD-10-CM

## 2018-04-11 DIAGNOSIS — Z17.1 MALIGNANT NEOPLASM OF UPPER-OUTER QUADRANT OF RIGHT BREAST IN FEMALE, ESTROGEN RECEPTOR NEGATIVE (H): ICD-10-CM

## 2018-04-11 ASSESSMENT — MIFFLIN-ST. JEOR: SCORE: 1236.25

## 2018-04-18 ENCOUNTER — OFFICE VISIT - HEALTHEAST (OUTPATIENT)
Dept: RADIATION ONCOLOGY | Facility: CLINIC | Age: 63
End: 2018-04-18

## 2018-04-18 DIAGNOSIS — C50.411 MALIGNANT NEOPLASM OF UPPER-OUTER QUADRANT OF RIGHT BREAST IN FEMALE, ESTROGEN RECEPTOR NEGATIVE (H): ICD-10-CM

## 2018-04-18 DIAGNOSIS — Z17.1 MALIGNANT NEOPLASM OF UPPER-OUTER QUADRANT OF RIGHT BREAST IN FEMALE, ESTROGEN RECEPTOR NEGATIVE (H): ICD-10-CM

## 2018-04-25 ENCOUNTER — OFFICE VISIT - HEALTHEAST (OUTPATIENT)
Dept: RADIATION ONCOLOGY | Facility: CLINIC | Age: 63
End: 2018-04-25

## 2018-04-25 DIAGNOSIS — Z17.1 MALIGNANT NEOPLASM OF UPPER-OUTER QUADRANT OF RIGHT BREAST IN FEMALE, ESTROGEN RECEPTOR NEGATIVE (H): ICD-10-CM

## 2018-04-25 DIAGNOSIS — C50.411 MALIGNANT NEOPLASM OF UPPER-OUTER QUADRANT OF RIGHT BREAST IN FEMALE, ESTROGEN RECEPTOR NEGATIVE (H): ICD-10-CM

## 2018-05-02 ENCOUNTER — OFFICE VISIT - HEALTHEAST (OUTPATIENT)
Dept: RADIATION ONCOLOGY | Facility: CLINIC | Age: 63
End: 2018-05-02

## 2018-05-02 DIAGNOSIS — C50.411 MALIGNANT NEOPLASM OF UPPER-OUTER QUADRANT OF RIGHT BREAST IN FEMALE, ESTROGEN RECEPTOR NEGATIVE (H): ICD-10-CM

## 2018-05-02 DIAGNOSIS — Z17.1 MALIGNANT NEOPLASM OF UPPER-OUTER QUADRANT OF RIGHT BREAST IN FEMALE, ESTROGEN RECEPTOR NEGATIVE (H): ICD-10-CM

## 2018-05-09 ENCOUNTER — OFFICE VISIT - HEALTHEAST (OUTPATIENT)
Dept: RADIATION ONCOLOGY | Facility: CLINIC | Age: 63
End: 2018-05-09

## 2018-05-09 DIAGNOSIS — Z17.1 MALIGNANT NEOPLASM OF UPPER-OUTER QUADRANT OF RIGHT BREAST IN FEMALE, ESTROGEN RECEPTOR NEGATIVE (H): ICD-10-CM

## 2018-05-09 DIAGNOSIS — C50.411 MALIGNANT NEOPLASM OF UPPER-OUTER QUADRANT OF RIGHT BREAST IN FEMALE, ESTROGEN RECEPTOR NEGATIVE (H): ICD-10-CM

## 2018-05-11 ENCOUNTER — AMBULATORY - HEALTHEAST (OUTPATIENT)
Dept: RADIATION ONCOLOGY | Facility: CLINIC | Age: 63
End: 2018-05-11

## 2018-05-21 ENCOUNTER — COMMUNICATION - HEALTHEAST (OUTPATIENT)
Dept: ONCOLOGY | Facility: HOSPITAL | Age: 63
End: 2018-05-21

## 2018-05-22 ENCOUNTER — COMMUNICATION - HEALTHEAST (OUTPATIENT)
Dept: RADIATION ONCOLOGY | Facility: CLINIC | Age: 63
End: 2018-05-22

## 2018-06-12 ENCOUNTER — OFFICE VISIT - HEALTHEAST (OUTPATIENT)
Dept: RADIATION ONCOLOGY | Facility: HOSPITAL | Age: 63
End: 2018-06-12

## 2018-06-12 DIAGNOSIS — Z17.1 MALIGNANT NEOPLASM OF UPPER-OUTER QUADRANT OF RIGHT BREAST IN FEMALE, ESTROGEN RECEPTOR NEGATIVE (H): ICD-10-CM

## 2018-06-12 DIAGNOSIS — C50.411 MALIGNANT NEOPLASM OF UPPER-OUTER QUADRANT OF RIGHT BREAST IN FEMALE, ESTROGEN RECEPTOR NEGATIVE (H): ICD-10-CM

## 2018-07-16 ENCOUNTER — HOSPITAL ENCOUNTER (OUTPATIENT)
Dept: MAMMOGRAPHY | Facility: CLINIC | Age: 63
Setting detail: RADIATION/ONCOLOGY SERIES
Discharge: STILL ADMITTED OR ADMINISTRATIVE | End: 2018-07-16
Attending: SPECIALIST

## 2018-07-16 DIAGNOSIS — Z85.3 PERSONAL HISTORY OF BREAST CANCER: ICD-10-CM

## 2018-07-17 ENCOUNTER — OFFICE VISIT - HEALTHEAST (OUTPATIENT)
Dept: ONCOLOGY | Facility: HOSPITAL | Age: 63
End: 2018-07-17

## 2018-07-17 DIAGNOSIS — Z17.1 MALIGNANT NEOPLASM OF UPPER-OUTER QUADRANT OF RIGHT BREAST IN FEMALE, ESTROGEN RECEPTOR NEGATIVE (H): ICD-10-CM

## 2018-07-17 DIAGNOSIS — C50.411 MALIGNANT NEOPLASM OF UPPER-OUTER QUADRANT OF RIGHT BREAST IN FEMALE, ESTROGEN RECEPTOR NEGATIVE (H): ICD-10-CM

## 2018-07-17 DIAGNOSIS — C50.919 BREAST CANCER (H): ICD-10-CM

## 2018-07-23 ENCOUNTER — RECORDS - HEALTHEAST (OUTPATIENT)
Dept: ADMINISTRATIVE | Facility: OTHER | Age: 63
End: 2018-07-23

## 2018-07-23 ENCOUNTER — RECORDS - HEALTHEAST (OUTPATIENT)
Dept: LAB | Facility: CLINIC | Age: 63
End: 2018-07-23

## 2018-07-23 LAB
ALBUMIN SERPL-MCNC: 3.7 G/DL (ref 3.5–5)
ALP SERPL-CCNC: 126 U/L (ref 45–120)
ALT SERPL W P-5'-P-CCNC: 27 U/L (ref 0–45)
ANION GAP SERPL CALCULATED.3IONS-SCNC: 9 MMOL/L (ref 5–18)
AST SERPL W P-5'-P-CCNC: 31 U/L (ref 0–40)
BASOPHILS # BLD AUTO: 0 THOU/UL (ref 0–0.2)
BASOPHILS NFR BLD AUTO: 0 % (ref 0–2)
BILIRUB SERPL-MCNC: 0.3 MG/DL (ref 0–1)
BUN SERPL-MCNC: 19 MG/DL (ref 8–22)
CALCIUM SERPL-MCNC: 9.8 MG/DL (ref 8.5–10.5)
CHLORIDE BLD-SCNC: 105 MMOL/L (ref 98–107)
CHOLEST SERPL-MCNC: 198 MG/DL
CO2 SERPL-SCNC: 27 MMOL/L (ref 22–31)
CREAT SERPL-MCNC: 0.85 MG/DL (ref 0.6–1.1)
EOSINOPHIL # BLD AUTO: 0.2 THOU/UL (ref 0–0.4)
EOSINOPHIL NFR BLD AUTO: 3 % (ref 0–6)
ERYTHROCYTE [DISTWIDTH] IN BLOOD BY AUTOMATED COUNT: 16.1 % (ref 11–14.5)
FASTING STATUS PATIENT QL REPORTED: ABNORMAL
GFR SERPL CREATININE-BSD FRML MDRD: >60 ML/MIN/1.73M2
GLUCOSE BLD-MCNC: 101 MG/DL (ref 70–125)
HCT VFR BLD AUTO: 42.9 % (ref 35–47)
HDLC SERPL-MCNC: 53 MG/DL
HGB BLD-MCNC: 13.1 G/DL (ref 12–16)
LDLC SERPL CALC-MCNC: 104 MG/DL
LYMPHOCYTES # BLD AUTO: 0.9 THOU/UL (ref 0.8–4.4)
LYMPHOCYTES NFR BLD AUTO: 13 % (ref 20–40)
MCH RBC QN AUTO: 27.5 PG (ref 27–34)
MCHC RBC AUTO-ENTMCNC: 30.5 G/DL (ref 32–36)
MCV RBC AUTO: 90 FL (ref 80–100)
MONOCYTES # BLD AUTO: 0.6 THOU/UL (ref 0–0.9)
MONOCYTES NFR BLD AUTO: 8 % (ref 2–10)
NEUTROPHILS # BLD AUTO: 5.5 THOU/UL (ref 2–7.7)
NEUTROPHILS NFR BLD AUTO: 77 % (ref 50–70)
PLATELET # BLD AUTO: 275 THOU/UL (ref 140–440)
PMV BLD AUTO: 10.1 FL (ref 8.5–12.5)
POTASSIUM BLD-SCNC: 5.5 MMOL/L (ref 3.5–5)
PROT SERPL-MCNC: 7.2 G/DL (ref 6–8)
RBC # BLD AUTO: 4.76 MILL/UL (ref 3.8–5.4)
SODIUM SERPL-SCNC: 141 MMOL/L (ref 136–145)
TRIGL SERPL-MCNC: 205 MG/DL
TSH SERPL DL<=0.005 MIU/L-ACNC: 1.39 UIU/ML (ref 0.3–5)
WBC: 7.2 THOU/UL (ref 4–11)

## 2018-07-24 ENCOUNTER — HOSPITAL ENCOUNTER (OUTPATIENT)
Dept: SURGERY | Facility: CLINIC | Age: 63
Discharge: HOME | End: 2018-07-24
Attending: SPECIALIST

## 2018-07-24 DIAGNOSIS — Z85.3 PERSONAL HISTORY OF BREAST CANCER: ICD-10-CM

## 2018-07-25 ENCOUNTER — RECORDS - HEALTHEAST (OUTPATIENT)
Dept: ADMINISTRATIVE | Facility: OTHER | Age: 63
End: 2018-07-25

## 2018-07-31 ENCOUNTER — COMMUNICATION - HEALTHEAST (OUTPATIENT)
Dept: ONCOLOGY | Facility: CLINIC | Age: 63
End: 2018-07-31

## 2018-08-01 ENCOUNTER — COMMUNICATION - HEALTHEAST (OUTPATIENT)
Dept: ONCOLOGY | Facility: CLINIC | Age: 63
End: 2018-08-01

## 2018-08-27 ENCOUNTER — COMMUNICATION - HEALTHEAST (OUTPATIENT)
Dept: ONCOLOGY | Facility: HOSPITAL | Age: 63
End: 2018-08-27

## 2018-11-12 ENCOUNTER — RECORDS - HEALTHEAST (OUTPATIENT)
Dept: LAB | Facility: CLINIC | Age: 63
End: 2018-11-12

## 2018-11-12 LAB
ANION GAP SERPL CALCULATED.3IONS-SCNC: 8 MMOL/L (ref 5–18)
BUN SERPL-MCNC: 22 MG/DL (ref 8–22)
CALCIUM SERPL-MCNC: 9.7 MG/DL (ref 8.5–10.5)
CHLORIDE BLD-SCNC: 106 MMOL/L (ref 98–107)
CO2 SERPL-SCNC: 28 MMOL/L (ref 22–31)
CREAT SERPL-MCNC: 0.83 MG/DL (ref 0.6–1.1)
GFR SERPL CREATININE-BSD FRML MDRD: >60 ML/MIN/1.73M2
GLUCOSE BLD-MCNC: 135 MG/DL (ref 70–125)
POTASSIUM BLD-SCNC: 4.8 MMOL/L (ref 3.5–5)
SODIUM SERPL-SCNC: 142 MMOL/L (ref 136–145)

## 2018-11-13 ENCOUNTER — ANESTHESIA - HEALTHEAST (OUTPATIENT)
Dept: SURGERY | Facility: HOSPITAL | Age: 63
End: 2018-11-13

## 2018-11-13 ASSESSMENT — MIFFLIN-ST. JEOR: SCORE: 1212.21

## 2018-11-14 ENCOUNTER — SURGERY - HEALTHEAST (OUTPATIENT)
Dept: SURGERY | Facility: HOSPITAL | Age: 63
End: 2018-11-14

## 2018-11-14 ASSESSMENT — MIFFLIN-ST. JEOR: SCORE: 1266.19

## 2018-11-28 ENCOUNTER — COMMUNICATION - HEALTHEAST (OUTPATIENT)
Dept: ONCOLOGY | Facility: HOSPITAL | Age: 63
End: 2018-11-28

## 2018-12-05 ENCOUNTER — AMBULATORY - HEALTHEAST (OUTPATIENT)
Dept: INFUSION THERAPY | Facility: HOSPITAL | Age: 63
End: 2018-12-05

## 2018-12-05 ENCOUNTER — OFFICE VISIT - HEALTHEAST (OUTPATIENT)
Dept: ONCOLOGY | Facility: HOSPITAL | Age: 63
End: 2018-12-05

## 2018-12-05 DIAGNOSIS — Z17.1 MALIGNANT NEOPLASM OF UPPER-OUTER QUADRANT OF RIGHT BREAST IN FEMALE, ESTROGEN RECEPTOR NEGATIVE (H): ICD-10-CM

## 2018-12-05 DIAGNOSIS — C50.411 MALIGNANT NEOPLASM OF UPPER-OUTER QUADRANT OF RIGHT BREAST IN FEMALE, ESTROGEN RECEPTOR NEGATIVE (H): ICD-10-CM

## 2018-12-05 DIAGNOSIS — C50.919 BREAST CANCER (H): ICD-10-CM

## 2018-12-05 LAB
ALBUMIN SERPL-MCNC: 3.3 G/DL (ref 3.5–5)
ALP SERPL-CCNC: 113 U/L (ref 45–120)
ALT SERPL W P-5'-P-CCNC: <9 U/L (ref 0–45)
ANION GAP SERPL CALCULATED.3IONS-SCNC: 10 MMOL/L (ref 5–18)
AST SERPL W P-5'-P-CCNC: 23 U/L (ref 0–40)
BASOPHILS # BLD AUTO: 0 THOU/UL (ref 0–0.2)
BASOPHILS NFR BLD AUTO: 0 % (ref 0–2)
BILIRUB SERPL-MCNC: 0.3 MG/DL (ref 0–1)
BUN SERPL-MCNC: 15 MG/DL (ref 8–22)
CALCIUM SERPL-MCNC: 9.6 MG/DL (ref 8.5–10.5)
CHLORIDE BLD-SCNC: 105 MMOL/L (ref 98–107)
CO2 SERPL-SCNC: 27 MMOL/L (ref 22–31)
CREAT SERPL-MCNC: 0.83 MG/DL (ref 0.6–1.1)
EOSINOPHIL # BLD AUTO: 0.2 THOU/UL (ref 0–0.4)
EOSINOPHIL NFR BLD AUTO: 4 % (ref 0–6)
ERYTHROCYTE [DISTWIDTH] IN BLOOD BY AUTOMATED COUNT: 14.9 % (ref 11–14.5)
GFR SERPL CREATININE-BSD FRML MDRD: >60 ML/MIN/1.73M2
GLUCOSE BLD-MCNC: 95 MG/DL (ref 70–125)
HCT VFR BLD AUTO: 42 % (ref 35–47)
HGB BLD-MCNC: 13.5 G/DL (ref 12–16)
LYMPHOCYTES # BLD AUTO: 0.7 THOU/UL (ref 0.8–4.4)
LYMPHOCYTES NFR BLD AUTO: 12 % (ref 20–40)
MCH RBC QN AUTO: 27.7 PG (ref 27–34)
MCHC RBC AUTO-ENTMCNC: 32.1 G/DL (ref 32–36)
MCV RBC AUTO: 86 FL (ref 80–100)
MONOCYTES # BLD AUTO: 0.6 THOU/UL (ref 0–0.9)
MONOCYTES NFR BLD AUTO: 9 % (ref 2–10)
NEUTROPHILS # BLD AUTO: 4.8 THOU/UL (ref 2–7.7)
NEUTROPHILS NFR BLD AUTO: 75 % (ref 50–70)
PLATELET # BLD AUTO: 251 THOU/UL (ref 140–440)
PMV BLD AUTO: 9.7 FL (ref 8.5–12.5)
POTASSIUM BLD-SCNC: 4.7 MMOL/L (ref 3.5–5)
PROT SERPL-MCNC: 7.1 G/DL (ref 6–8)
RBC # BLD AUTO: 4.87 MILL/UL (ref 3.8–5.4)
SODIUM SERPL-SCNC: 142 MMOL/L (ref 136–145)
WBC: 6.4 THOU/UL (ref 4–11)

## 2018-12-06 ENCOUNTER — HOSPITAL ENCOUNTER (OUTPATIENT)
Dept: SURGERY | Facility: CLINIC | Age: 63
Discharge: HOME | End: 2018-12-06
Attending: SPECIALIST

## 2018-12-06 DIAGNOSIS — Z85.3 PERSONAL HISTORY OF BREAST CANCER: ICD-10-CM

## 2018-12-06 ASSESSMENT — MIFFLIN-ST. JEOR: SCORE: 1216.74

## 2019-03-21 ENCOUNTER — RECORDS - HEALTHEAST (OUTPATIENT)
Dept: ADMINISTRATIVE | Facility: OTHER | Age: 64
End: 2019-03-21

## 2019-03-22 ENCOUNTER — RECORDS - HEALTHEAST (OUTPATIENT)
Dept: ADMINISTRATIVE | Facility: OTHER | Age: 64
End: 2019-03-22

## 2019-04-04 ENCOUNTER — RECORDS - HEALTHEAST (OUTPATIENT)
Dept: ADMINISTRATIVE | Facility: OTHER | Age: 64
End: 2019-04-04

## 2019-04-05 ENCOUNTER — OFFICE VISIT - HEALTHEAST (OUTPATIENT)
Dept: ONCOLOGY | Facility: HOSPITAL | Age: 64
End: 2019-04-05

## 2019-04-05 ENCOUNTER — AMBULATORY - HEALTHEAST (OUTPATIENT)
Dept: INFUSION THERAPY | Facility: HOSPITAL | Age: 64
End: 2019-04-05

## 2019-04-05 DIAGNOSIS — C50.411 MALIGNANT NEOPLASM OF UPPER-OUTER QUADRANT OF RIGHT BREAST IN FEMALE, ESTROGEN RECEPTOR NEGATIVE (H): ICD-10-CM

## 2019-04-05 DIAGNOSIS — Z17.1 MALIGNANT NEOPLASM OF UPPER-OUTER QUADRANT OF RIGHT BREAST IN FEMALE, ESTROGEN RECEPTOR NEGATIVE (H): ICD-10-CM

## 2019-04-09 ENCOUNTER — AMBULATORY - HEALTHEAST (OUTPATIENT)
Dept: ONCOLOGY | Facility: HOSPITAL | Age: 64
End: 2019-04-09

## 2019-04-09 DIAGNOSIS — C50.411 MALIGNANT NEOPLASM OF UPPER-OUTER QUADRANT OF RIGHT BREAST IN FEMALE, ESTROGEN RECEPTOR NEGATIVE (H): ICD-10-CM

## 2019-04-09 DIAGNOSIS — Z17.1 MALIGNANT NEOPLASM OF UPPER-OUTER QUADRANT OF RIGHT BREAST IN FEMALE, ESTROGEN RECEPTOR NEGATIVE (H): ICD-10-CM

## 2019-04-17 ENCOUNTER — AMBULATORY - HEALTHEAST (OUTPATIENT)
Dept: INFUSION THERAPY | Facility: HOSPITAL | Age: 64
End: 2019-04-17

## 2019-04-17 DIAGNOSIS — Z17.1 MALIGNANT NEOPLASM OF UPPER-OUTER QUADRANT OF RIGHT BREAST IN FEMALE, ESTROGEN RECEPTOR NEGATIVE (H): ICD-10-CM

## 2019-04-17 DIAGNOSIS — C50.411 MALIGNANT NEOPLASM OF UPPER-OUTER QUADRANT OF RIGHT BREAST IN FEMALE, ESTROGEN RECEPTOR NEGATIVE (H): ICD-10-CM

## 2019-04-18 ENCOUNTER — AMBULATORY - HEALTHEAST (OUTPATIENT)
Dept: ONCOLOGY | Facility: HOSPITAL | Age: 64
End: 2019-04-18

## 2019-04-18 DIAGNOSIS — C50.411 MALIGNANT NEOPLASM OF UPPER-OUTER QUADRANT OF RIGHT BREAST IN FEMALE, ESTROGEN RECEPTOR NEGATIVE (H): ICD-10-CM

## 2019-04-18 DIAGNOSIS — Z17.1 MALIGNANT NEOPLASM OF UPPER-OUTER QUADRANT OF RIGHT BREAST IN FEMALE, ESTROGEN RECEPTOR NEGATIVE (H): ICD-10-CM

## 2019-05-15 ENCOUNTER — AMBULATORY - HEALTHEAST (OUTPATIENT)
Dept: INFUSION THERAPY | Facility: HOSPITAL | Age: 64
End: 2019-05-15

## 2019-05-15 DIAGNOSIS — C50.411 MALIGNANT NEOPLASM OF UPPER-OUTER QUADRANT OF RIGHT BREAST IN FEMALE, ESTROGEN RECEPTOR NEGATIVE (H): ICD-10-CM

## 2019-05-15 DIAGNOSIS — Z17.1 MALIGNANT NEOPLASM OF UPPER-OUTER QUADRANT OF RIGHT BREAST IN FEMALE, ESTROGEN RECEPTOR NEGATIVE (H): ICD-10-CM

## 2019-07-18 ENCOUNTER — HOSPITAL ENCOUNTER (OUTPATIENT)
Dept: MAMMOGRAPHY | Facility: CLINIC | Age: 64
Discharge: HOME | End: 2019-07-18
Attending: FAMILY MEDICINE

## 2019-07-18 DIAGNOSIS — Z85.3 HISTORY OF BREAST CANCER: ICD-10-CM

## 2019-07-18 DIAGNOSIS — Z12.31 VISIT FOR SCREENING MAMMOGRAM: ICD-10-CM

## 2019-07-25 ENCOUNTER — RECORDS - HEALTHEAST (OUTPATIENT)
Dept: LAB | Facility: CLINIC | Age: 64
End: 2019-07-25

## 2019-07-25 ENCOUNTER — RECORDS - HEALTHEAST (OUTPATIENT)
Dept: ADMINISTRATIVE | Facility: OTHER | Age: 64
End: 2019-07-25

## 2019-07-25 LAB
ALBUMIN SERPL-MCNC: 3.7 G/DL (ref 3.5–5)
ALP SERPL-CCNC: 122 U/L (ref 45–120)
ALT SERPL W P-5'-P-CCNC: 10 U/L (ref 0–45)
ANION GAP SERPL CALCULATED.3IONS-SCNC: 8 MMOL/L (ref 5–18)
AST SERPL W P-5'-P-CCNC: 26 U/L (ref 0–40)
BILIRUB SERPL-MCNC: 0.3 MG/DL (ref 0–1)
BUN SERPL-MCNC: 16 MG/DL (ref 8–22)
CALCIUM SERPL-MCNC: 10 MG/DL (ref 8.5–10.5)
CHLORIDE BLD-SCNC: 105 MMOL/L (ref 98–107)
CHOLEST SERPL-MCNC: 197 MG/DL
CO2 SERPL-SCNC: 28 MMOL/L (ref 22–31)
CREAT SERPL-MCNC: 0.81 MG/DL (ref 0.6–1.1)
FASTING STATUS PATIENT QL REPORTED: NO
GFR SERPL CREATININE-BSD FRML MDRD: >60 ML/MIN/1.73M2
GLUCOSE BLD-MCNC: 97 MG/DL (ref 70–125)
HDLC SERPL-MCNC: 62 MG/DL
LDLC SERPL CALC-MCNC: 121 MG/DL
POTASSIUM BLD-SCNC: 4.6 MMOL/L (ref 3.5–5)
PROT SERPL-MCNC: 6.9 G/DL (ref 6–8)
SODIUM SERPL-SCNC: 141 MMOL/L (ref 136–145)
TRIGL SERPL-MCNC: 68 MG/DL
TSH SERPL DL<=0.005 MIU/L-ACNC: 0.95 UIU/ML (ref 0.3–5)

## 2019-07-30 ENCOUNTER — AMBULATORY - HEALTHEAST (OUTPATIENT)
Dept: ONCOLOGY | Facility: HOSPITAL | Age: 64
End: 2019-07-30

## 2019-07-30 DIAGNOSIS — Z00.6 RESEARCH STUDY PATIENT: ICD-10-CM

## 2019-07-30 DIAGNOSIS — Z17.1 MALIGNANT NEOPLASM OF UPPER-OUTER QUADRANT OF RIGHT BREAST IN FEMALE, ESTROGEN RECEPTOR NEGATIVE (H): ICD-10-CM

## 2019-07-30 DIAGNOSIS — C50.411 MALIGNANT NEOPLASM OF UPPER-OUTER QUADRANT OF RIGHT BREAST IN FEMALE, ESTROGEN RECEPTOR NEGATIVE (H): ICD-10-CM

## 2019-08-02 ENCOUNTER — HOSPITAL ENCOUNTER (OUTPATIENT)
Dept: SURGERY | Facility: CLINIC | Age: 64
Discharge: HOME | End: 2019-08-02
Attending: SPECIALIST

## 2019-08-02 DIAGNOSIS — Z85.3 PERSONAL HISTORY OF BREAST CANCER: ICD-10-CM

## 2019-08-02 ASSESSMENT — MIFFLIN-ST. JEOR: SCORE: 1221.28

## 2019-08-08 ENCOUNTER — AMBULATORY - HEALTHEAST (OUTPATIENT)
Dept: INFUSION THERAPY | Facility: HOSPITAL | Age: 64
End: 2019-08-08

## 2019-08-08 DIAGNOSIS — Z17.1 MALIGNANT NEOPLASM OF UPPER-OUTER QUADRANT OF RIGHT BREAST IN FEMALE, ESTROGEN RECEPTOR NEGATIVE (H): ICD-10-CM

## 2019-08-08 DIAGNOSIS — C50.411 MALIGNANT NEOPLASM OF UPPER-OUTER QUADRANT OF RIGHT BREAST IN FEMALE, ESTROGEN RECEPTOR NEGATIVE (H): ICD-10-CM

## 2019-08-08 DIAGNOSIS — Z00.6 RESEARCH STUDY PATIENT: ICD-10-CM

## 2019-10-02 ENCOUNTER — OFFICE VISIT - HEALTHEAST (OUTPATIENT)
Dept: ONCOLOGY | Facility: HOSPITAL | Age: 64
End: 2019-10-02

## 2019-10-02 ENCOUNTER — AMBULATORY - HEALTHEAST (OUTPATIENT)
Dept: INFUSION THERAPY | Facility: HOSPITAL | Age: 64
End: 2019-10-02

## 2019-10-02 DIAGNOSIS — C50.411 MALIGNANT NEOPLASM OF UPPER-OUTER QUADRANT OF RIGHT BREAST IN FEMALE, ESTROGEN RECEPTOR NEGATIVE (H): ICD-10-CM

## 2019-10-02 DIAGNOSIS — Z17.1 MALIGNANT NEOPLASM OF UPPER-OUTER QUADRANT OF RIGHT BREAST IN FEMALE, ESTROGEN RECEPTOR NEGATIVE (H): ICD-10-CM

## 2019-10-02 LAB
ALBUMIN SERPL-MCNC: 3.5 G/DL (ref 3.5–5)
ALP SERPL-CCNC: 130 U/L (ref 45–120)
ALT SERPL W P-5'-P-CCNC: 10 U/L (ref 0–45)
ANION GAP SERPL CALCULATED.3IONS-SCNC: 9 MMOL/L (ref 5–18)
AST SERPL W P-5'-P-CCNC: 23 U/L (ref 0–40)
BASOPHILS # BLD AUTO: 0 THOU/UL (ref 0–0.2)
BASOPHILS NFR BLD AUTO: 0 % (ref 0–2)
BILIRUB SERPL-MCNC: 0.3 MG/DL (ref 0–1)
BUN SERPL-MCNC: 15 MG/DL (ref 8–22)
CALCIUM SERPL-MCNC: 9.4 MG/DL (ref 8.5–10.5)
CHLORIDE BLD-SCNC: 102 MMOL/L (ref 98–107)
CO2 SERPL-SCNC: 31 MMOL/L (ref 22–31)
CREAT SERPL-MCNC: 0.91 MG/DL (ref 0.6–1.1)
EOSINOPHIL # BLD AUTO: 0.2 THOU/UL (ref 0–0.4)
EOSINOPHIL NFR BLD AUTO: 2 % (ref 0–6)
ERYTHROCYTE [DISTWIDTH] IN BLOOD BY AUTOMATED COUNT: 14.5 % (ref 11–14.5)
GFR SERPL CREATININE-BSD FRML MDRD: >60 ML/MIN/1.73M2
GLUCOSE BLD-MCNC: 95 MG/DL (ref 70–125)
HCT VFR BLD AUTO: 43.2 % (ref 35–47)
HGB BLD-MCNC: 13.8 G/DL (ref 12–16)
LYMPHOCYTES # BLD AUTO: 1.4 THOU/UL (ref 0.8–4.4)
LYMPHOCYTES NFR BLD AUTO: 19 % (ref 20–40)
MCH RBC QN AUTO: 28.7 PG (ref 27–34)
MCHC RBC AUTO-ENTMCNC: 31.9 G/DL (ref 32–36)
MCV RBC AUTO: 90 FL (ref 80–100)
MONOCYTES # BLD AUTO: 0.8 THOU/UL (ref 0–0.9)
MONOCYTES NFR BLD AUTO: 10 % (ref 2–10)
NEUTROPHILS # BLD AUTO: 5 THOU/UL (ref 2–7.7)
NEUTROPHILS NFR BLD AUTO: 67 % (ref 50–70)
PLATELET # BLD AUTO: 248 THOU/UL (ref 140–440)
PMV BLD AUTO: 9.6 FL (ref 8.5–12.5)
POTASSIUM BLD-SCNC: 3.5 MMOL/L (ref 3.5–5)
PROT SERPL-MCNC: 7.5 G/DL (ref 6–8)
RBC # BLD AUTO: 4.81 MILL/UL (ref 3.8–5.4)
SODIUM SERPL-SCNC: 142 MMOL/L (ref 136–145)
WBC: 7.4 THOU/UL (ref 4–11)

## 2019-10-18 ENCOUNTER — AMBULATORY - HEALTHEAST (OUTPATIENT)
Dept: ONCOLOGY | Facility: HOSPITAL | Age: 64
End: 2019-10-18

## 2019-10-18 DIAGNOSIS — C50.411 MALIGNANT NEOPLASM OF UPPER-OUTER QUADRANT OF RIGHT BREAST IN FEMALE, ESTROGEN RECEPTOR NEGATIVE (H): ICD-10-CM

## 2019-10-18 DIAGNOSIS — Z17.1 MALIGNANT NEOPLASM OF UPPER-OUTER QUADRANT OF RIGHT BREAST IN FEMALE, ESTROGEN RECEPTOR NEGATIVE (H): ICD-10-CM

## 2019-10-18 DIAGNOSIS — Z00.6 RESEARCH STUDY PATIENT: ICD-10-CM

## 2019-10-20 ENCOUNTER — RECORDS - HEALTHEAST (OUTPATIENT)
Dept: LAB | Facility: CLINIC | Age: 64
End: 2019-10-20

## 2019-10-22 LAB — BACTERIA SPEC CULT: NORMAL

## 2019-10-30 ENCOUNTER — AMBULATORY - HEALTHEAST (OUTPATIENT)
Dept: INFUSION THERAPY | Facility: HOSPITAL | Age: 64
End: 2019-10-30

## 2019-10-30 DIAGNOSIS — C50.411 MALIGNANT NEOPLASM OF UPPER-OUTER QUADRANT OF RIGHT BREAST IN FEMALE, ESTROGEN RECEPTOR NEGATIVE (H): ICD-10-CM

## 2019-10-30 DIAGNOSIS — Z00.6 RESEARCH STUDY PATIENT: ICD-10-CM

## 2019-10-30 DIAGNOSIS — Z17.1 MALIGNANT NEOPLASM OF UPPER-OUTER QUADRANT OF RIGHT BREAST IN FEMALE, ESTROGEN RECEPTOR NEGATIVE (H): ICD-10-CM

## 2020-04-17 ENCOUNTER — COMMUNICATION - HEALTHEAST (OUTPATIENT)
Dept: ADMINISTRATIVE | Facility: HOSPITAL | Age: 65
End: 2020-04-17

## 2020-05-11 ENCOUNTER — RECORDS - HEALTHEAST (OUTPATIENT)
Dept: ADMINISTRATIVE | Facility: OTHER | Age: 65
End: 2020-05-11

## 2020-06-22 ENCOUNTER — AMBULATORY - HEALTHEAST (OUTPATIENT)
Dept: INFUSION THERAPY | Facility: HOSPITAL | Age: 65
End: 2020-06-22

## 2020-06-22 DIAGNOSIS — Z17.1 MALIGNANT NEOPLASM OF UPPER-OUTER QUADRANT OF RIGHT BREAST IN FEMALE, ESTROGEN RECEPTOR NEGATIVE (H): ICD-10-CM

## 2020-06-22 DIAGNOSIS — C50.411 MALIGNANT NEOPLASM OF UPPER-OUTER QUADRANT OF RIGHT BREAST IN FEMALE, ESTROGEN RECEPTOR NEGATIVE (H): ICD-10-CM

## 2020-06-22 LAB
ALBUMIN SERPL-MCNC: 3.7 G/DL (ref 3.5–5)
ALP SERPL-CCNC: 137 U/L (ref 45–120)
ALT SERPL W P-5'-P-CCNC: 9 U/L (ref 0–45)
ANION GAP SERPL CALCULATED.3IONS-SCNC: 9 MMOL/L (ref 5–18)
AST SERPL W P-5'-P-CCNC: 24 U/L (ref 0–40)
BASOPHILS # BLD AUTO: 0 THOU/UL (ref 0–0.2)
BASOPHILS NFR BLD AUTO: 0 % (ref 0–2)
BILIRUB SERPL-MCNC: 0.2 MG/DL (ref 0–1)
BUN SERPL-MCNC: 17 MG/DL (ref 8–22)
CALCIUM SERPL-MCNC: 9.6 MG/DL (ref 8.5–10.5)
CHLORIDE BLD-SCNC: 104 MMOL/L (ref 98–107)
CO2 SERPL-SCNC: 26 MMOL/L (ref 22–31)
CREAT SERPL-MCNC: 0.82 MG/DL (ref 0.6–1.1)
EOSINOPHIL # BLD AUTO: 0.2 THOU/UL (ref 0–0.4)
EOSINOPHIL NFR BLD AUTO: 2 % (ref 0–6)
ERYTHROCYTE [DISTWIDTH] IN BLOOD BY AUTOMATED COUNT: 14.7 % (ref 11–14.5)
GFR SERPL CREATININE-BSD FRML MDRD: >60 ML/MIN/1.73M2
GLUCOSE BLD-MCNC: 97 MG/DL (ref 70–125)
HCT VFR BLD AUTO: 43.4 % (ref 35–47)
HGB BLD-MCNC: 13.5 G/DL (ref 12–16)
LYMPHOCYTES # BLD AUTO: 1.9 THOU/UL (ref 0.8–4.4)
LYMPHOCYTES NFR BLD AUTO: 21 % (ref 20–40)
MCH RBC QN AUTO: 27.7 PG (ref 27–34)
MCHC RBC AUTO-ENTMCNC: 31.1 G/DL (ref 32–36)
MCV RBC AUTO: 89 FL (ref 80–100)
MONOCYTES # BLD AUTO: 0.6 THOU/UL (ref 0–0.9)
MONOCYTES NFR BLD AUTO: 7 % (ref 2–10)
NEUTROPHILS # BLD AUTO: 6.4 THOU/UL (ref 2–7.7)
NEUTROPHILS NFR BLD AUTO: 69 % (ref 50–70)
PLATELET # BLD AUTO: 256 THOU/UL (ref 140–440)
PMV BLD AUTO: 9.5 FL (ref 8.5–12.5)
POTASSIUM BLD-SCNC: 4.6 MMOL/L (ref 3.5–5)
PROT SERPL-MCNC: 7.7 G/DL (ref 6–8)
RBC # BLD AUTO: 4.87 MILL/UL (ref 3.8–5.4)
SODIUM SERPL-SCNC: 139 MMOL/L (ref 136–145)
WBC: 9.2 THOU/UL (ref 4–11)

## 2020-06-23 ENCOUNTER — OFFICE VISIT - HEALTHEAST (OUTPATIENT)
Dept: ONCOLOGY | Facility: HOSPITAL | Age: 65
End: 2020-06-23

## 2020-06-23 DIAGNOSIS — C50.411 MALIGNANT NEOPLASM OF UPPER-OUTER QUADRANT OF RIGHT BREAST IN FEMALE, ESTROGEN RECEPTOR NEGATIVE (H): ICD-10-CM

## 2020-06-23 DIAGNOSIS — Z17.1 MALIGNANT NEOPLASM OF UPPER-OUTER QUADRANT OF RIGHT BREAST IN FEMALE, ESTROGEN RECEPTOR NEGATIVE (H): ICD-10-CM

## 2020-06-23 RX ORDER — FEXOFENADINE HCL 180 MG/1
180 TABLET ORAL PRN
Status: SHIPPED | COMMUNITY
Start: 2020-06-23

## 2020-06-23 RX ORDER — PANTOPRAZOLE SODIUM 20 MG/1
TABLET, DELAYED RELEASE ORAL
Status: SHIPPED | COMMUNITY
Start: 2020-05-11 | End: 2023-03-24

## 2020-07-21 ENCOUNTER — HOSPITAL ENCOUNTER (OUTPATIENT)
Dept: MAMMOGRAPHY | Facility: CLINIC | Age: 65
Discharge: HOME | End: 2020-07-21
Attending: FAMILY MEDICINE

## 2020-07-21 DIAGNOSIS — Z12.31 VISIT FOR SCREENING MAMMOGRAM: ICD-10-CM

## 2020-08-04 ENCOUNTER — HOSPITAL ENCOUNTER (OUTPATIENT)
Dept: SURGERY | Facility: CLINIC | Age: 65
Discharge: HOME | End: 2020-08-04
Attending: SPECIALIST

## 2020-08-04 DIAGNOSIS — Z85.3 PERSONAL HISTORY OF BREAST CANCER: ICD-10-CM

## 2020-10-06 ENCOUNTER — RECORDS - HEALTHEAST (OUTPATIENT)
Dept: LAB | Facility: CLINIC | Age: 65
End: 2020-10-06

## 2020-10-06 LAB
ALBUMIN SERPL-MCNC: 3.8 G/DL (ref 3.5–5)
ALP SERPL-CCNC: 129 U/L (ref 45–120)
ALT SERPL W P-5'-P-CCNC: 10 U/L (ref 0–45)
ANION GAP SERPL CALCULATED.3IONS-SCNC: 9 MMOL/L (ref 5–18)
AST SERPL W P-5'-P-CCNC: 26 U/L (ref 0–40)
BILIRUB SERPL-MCNC: 0.2 MG/DL (ref 0–1)
BUN SERPL-MCNC: 20 MG/DL (ref 8–22)
CALCIUM SERPL-MCNC: 9.5 MG/DL (ref 8.5–10.5)
CHLORIDE BLD-SCNC: 105 MMOL/L (ref 98–107)
CHOLEST SERPL-MCNC: 184 MG/DL
CO2 SERPL-SCNC: 29 MMOL/L (ref 22–31)
CREAT SERPL-MCNC: 0.8 MG/DL (ref 0.6–1.1)
FASTING STATUS PATIENT QL REPORTED: NORMAL
GFR SERPL CREATININE-BSD FRML MDRD: >60 ML/MIN/1.73M2
GLUCOSE BLD-MCNC: 116 MG/DL (ref 70–125)
HDLC SERPL-MCNC: 56 MG/DL
LDLC SERPL CALC-MCNC: 110 MG/DL
POTASSIUM BLD-SCNC: 4.7 MMOL/L (ref 3.5–5)
PROT SERPL-MCNC: 7.3 G/DL (ref 6–8)
SODIUM SERPL-SCNC: 143 MMOL/L (ref 136–145)
TRIGL SERPL-MCNC: 92 MG/DL
TSH SERPL DL<=0.005 MIU/L-ACNC: 1.31 UIU/ML (ref 0.3–5)

## 2020-10-07 LAB — 25(OH)D3 SERPL-MCNC: 40.6 NG/ML (ref 30–80)

## 2020-12-17 ENCOUNTER — OFFICE VISIT - HEALTHEAST (OUTPATIENT)
Dept: ONCOLOGY | Facility: HOSPITAL | Age: 65
End: 2020-12-17

## 2020-12-17 DIAGNOSIS — C50.411 MALIGNANT NEOPLASM OF UPPER-OUTER QUADRANT OF RIGHT BREAST IN FEMALE, ESTROGEN RECEPTOR NEGATIVE (H): ICD-10-CM

## 2020-12-17 DIAGNOSIS — M81.0 AGE-RELATED OSTEOPOROSIS WITHOUT CURRENT PATHOLOGICAL FRACTURE: ICD-10-CM

## 2020-12-17 DIAGNOSIS — Z17.1 MALIGNANT NEOPLASM OF UPPER-OUTER QUADRANT OF RIGHT BREAST IN FEMALE, ESTROGEN RECEPTOR NEGATIVE (H): ICD-10-CM

## 2020-12-17 RX ORDER — QUINIDINE SULFATE 200 MG
2000 TABLET ORAL DAILY
Status: SHIPPED | COMMUNITY
Start: 2020-12-17 | End: 2022-07-11

## 2020-12-18 ENCOUNTER — COMMUNICATION - HEALTHEAST (OUTPATIENT)
Dept: ONCOLOGY | Facility: HOSPITAL | Age: 65
End: 2020-12-18

## 2020-12-18 ENCOUNTER — AMBULATORY - HEALTHEAST (OUTPATIENT)
Dept: INFUSION THERAPY | Facility: HOSPITAL | Age: 65
End: 2020-12-18

## 2020-12-18 DIAGNOSIS — C50.411 MALIGNANT NEOPLASM OF UPPER-OUTER QUADRANT OF RIGHT BREAST IN FEMALE, ESTROGEN RECEPTOR NEGATIVE (H): ICD-10-CM

## 2020-12-18 DIAGNOSIS — Z17.1 MALIGNANT NEOPLASM OF UPPER-OUTER QUADRANT OF RIGHT BREAST IN FEMALE, ESTROGEN RECEPTOR NEGATIVE (H): ICD-10-CM

## 2020-12-18 LAB
ALBUMIN SERPL-MCNC: 3.7 G/DL (ref 3.5–5)
ALP SERPL-CCNC: 120 U/L (ref 45–120)
ALT SERPL W P-5'-P-CCNC: 11 U/L (ref 0–45)
ANION GAP SERPL CALCULATED.3IONS-SCNC: 8 MMOL/L (ref 5–18)
AST SERPL W P-5'-P-CCNC: 27 U/L (ref 0–40)
BASOPHILS # BLD AUTO: 0 THOU/UL (ref 0–0.2)
BASOPHILS NFR BLD AUTO: 1 % (ref 0–2)
BILIRUB SERPL-MCNC: 0.3 MG/DL (ref 0–1)
BUN SERPL-MCNC: 20 MG/DL (ref 8–22)
CALCIUM SERPL-MCNC: 9.4 MG/DL (ref 8.5–10.5)
CHLORIDE BLD-SCNC: 103 MMOL/L (ref 98–107)
CO2 SERPL-SCNC: 29 MMOL/L (ref 22–31)
CREAT SERPL-MCNC: 0.87 MG/DL (ref 0.6–1.1)
EOSINOPHIL # BLD AUTO: 0.1 THOU/UL (ref 0–0.4)
EOSINOPHIL NFR BLD AUTO: 1 % (ref 0–6)
ERYTHROCYTE [DISTWIDTH] IN BLOOD BY AUTOMATED COUNT: 14.7 % (ref 11–14.5)
GFR SERPL CREATININE-BSD FRML MDRD: >60 ML/MIN/1.73M2
GLUCOSE BLD-MCNC: 106 MG/DL (ref 70–125)
HCT VFR BLD AUTO: 44.8 % (ref 35–47)
HGB BLD-MCNC: 13.9 G/DL (ref 12–16)
IMM GRANULOCYTES # BLD: 0.1 THOU/UL
IMM GRANULOCYTES NFR BLD: 1 %
LYMPHOCYTES # BLD AUTO: 1.8 THOU/UL (ref 0.8–4.4)
LYMPHOCYTES NFR BLD AUTO: 21 % (ref 20–40)
MCH RBC QN AUTO: 27.7 PG (ref 27–34)
MCHC RBC AUTO-ENTMCNC: 31 G/DL (ref 32–36)
MCV RBC AUTO: 89 FL (ref 80–100)
MONOCYTES # BLD AUTO: 0.6 THOU/UL (ref 0–0.9)
MONOCYTES NFR BLD AUTO: 6 % (ref 2–10)
NEUTROPHILS # BLD AUTO: 6 THOU/UL (ref 2–7.7)
NEUTROPHILS NFR BLD AUTO: 70 % (ref 50–70)
PLATELET # BLD AUTO: 278 THOU/UL (ref 140–440)
PMV BLD AUTO: 9.8 FL (ref 8.5–12.5)
POTASSIUM BLD-SCNC: 4.1 MMOL/L (ref 3.5–5)
PROT SERPL-MCNC: 7.6 G/DL (ref 6–8)
RBC # BLD AUTO: 5.01 MILL/UL (ref 3.8–5.4)
SODIUM SERPL-SCNC: 140 MMOL/L (ref 136–145)
WBC: 8.5 THOU/UL (ref 4–11)

## 2021-05-27 NOTE — PROGRESS NOTES
[facility] Hematology and Oncology Progress Note    Patient: Willa Light  MRN: [id number]  Date of Service: April 5, 2019      Assessment and Plan:    Breast cancer    Primary site: Breast (Left)    Staging method: AJCC 7th Edition    Clinical: Stage IA (T1c, N0, cM0) - Signed by Moise Dee MD on 8/13/2014    Pathologic free text: [id number]    Summary: Stage IA (T1c, N0, cM0)    1. Breast cancer: Overall doing well.  No clinical evidence of recurrent disease.  Triple negative disease so she is not on any adjuvant therapy.  We will continue to see her every 6 months at this point.  She will let us know in the interim if she develops any new symptoms.  Her next mammogram is due in July this year.    2.  Chemotherapy-induced peripheral neuropathy: Continues to improve.  Remains grade 1.    ECOG Performance   ECOG Performance Status: 0    Distress Assessment  Distress Assessment Score: 1    Pain  Currently in Pain: No/denies    Diagnosis:    1. Invasive ductal carcinoma of the left breast:  Triple negative. Stage [id number]. Diagnosed July 2014. She had a positive margin at initial surgery. BRCA-.    2.  Invasive ductal carcinoma of the right breast: Triple negative.  Grade 3.  Clinical stage T2N1M0.  Diagnosed July 2017.  MRI shows 3 foci and probable intervening tumor between these 3 foci.  Total size of abnormality in the MRI was 4.2 cm.  One suspicious-appearing node in the axilla.    3.  Pneumonitis: CT on October 11 shows diffuse bilateral mixed airspace and interstitial infiltrates.  Possibly due to chemotherapy.  Bronchoscopy was done on October 20.  Cultures negative.  She seemed to respond to steroids.    Treatment:    She had a lumpectomy followed by 4 cycles of Taxotere and Cytoxan. This was completed in October 2014. She then had a reresection for the positive margin. She then had adjuvant radiation completed in February 2015.     She was treated with neoadjuvant Adriamycin and Cytoxan for  her second, most recent diagnosis.  Chemotherapy started on August 16, 2017.  Cycle 4 given on September 27.  Weekly Taxol started October 11.  Taxol reduced 25% for neuropathy.  Last was completed on January 17, 2018    Right-sided lumpectomy performed in February 12, 2018.  Pathology showed complete response.  No residual disease.  Adjuvant radiation completed on May 11, 2018    Interim History:    Willa is here for follow-up visit.  We last saw her 4 months ago.  In the interim she is been doing well.  No new changes in her health.  No acute complaints today.  No headaches, chest pain, bony pain, cough, or shortness of breath.     Review of Systems:    Constitutional  Constitutional (WDL): Exceptions to WDL  Fatigue: Fatigue relieved by rest  Neurosensory  Neurosensory (WDL): Exceptions to WDL  Ataxia: Asymptomatic, clinical or diagnostic observations only, intervention not indicated(improving)  Peripheral Sensory Neuropathy: Asymptomatic, loss of deep tendon reflexes or paresthesia(fingertips, feet)  Cardiovascular  Cardiovascular (WDL): All cardiovascular elements are within defined limits  Pulmonary  Respiratory (WDL): Within Defined Limits  Gastrointestinal  Gastrointestinal (WDL): All gastrointestinal elements are within defined limits  Constipation: Occasional or intermittent symptoms, occasional use of stool softeners, laxatives, dietary modification, or enema(occ)  Genitourinary  Genitourinary (WDL): All genitourinary elements are within defined limits  Integumentary  Integumentary (WDL): All integumentary elements are within defined limits  Patient Coping  Patient Coping: Accepting  Accompanied by  Accompanied by: Alone    Past History:    Past Medical History:   Diagnosis Date     Asthma      Breast cancer (H) 2014    left     Breast cancer (H) 2017    right     Bronchitis, chronic (H)      Colon polyp 2009    one precancerous polyp     Cytoxan-induced pulmonary interstitial disease (H)      Disease  of thyroid gland      GERD (gastroesophageal reflux disease)      Hx antineoplastic chemotherapy 2014     Hx of radiation therapy 2014     Peripheral neuropathy     hands and feet     Pneumonia      Sinusitis      Uterine polyp      Physical Exam:    Recent Vitals 4/5/2019   Height -   Weight 162 lbs 8 oz   BSA (m2) 1.78 m2   /83   Pulse 79   Temp 98.4   Temp src 1   SpO2 100   Some recent data might be hidden     General: patient appears stated age of 63 y.o.. Nontoxic and in no distress.   HEENT: Head: atraumatic, normocephalic. Sclerae anicteric.  Chest:  Normal respiratory effort.  Cardiac:  No edema.   Abdomen: abdomen is non-distended  Extremities: normal tone and muscle bulk.  Skin: no lesions or rash. Warm and dry.   CNS: alert and oriented x3. Grossly non-focal.   Psychiatric: normal mood and affect.     Lab Results:    No results found for this or any previous visit (from the past 168 hour(s)).     Imaging:    No results found.      Signed by: Moise Dee MD

## 2021-05-30 VITALS — BODY MASS INDEX: 31.63 KG/M2 | WEIGHT: 167.5 LBS | HEIGHT: 61 IN

## 2021-05-31 VITALS — WEIGHT: 158.4 LBS | BODY MASS INDEX: 29.93 KG/M2

## 2021-05-31 VITALS — BODY MASS INDEX: 31.54 KG/M2 | WEIGHT: 166.9 LBS

## 2021-05-31 VITALS — BODY MASS INDEX: 31.37 KG/M2 | WEIGHT: 166 LBS

## 2021-05-31 VITALS — BODY MASS INDEX: 29.31 KG/M2 | WEIGHT: 155.1 LBS

## 2021-05-31 VITALS — HEIGHT: 61 IN | BODY MASS INDEX: 30.93 KG/M2 | WEIGHT: 163.8 LBS

## 2021-05-31 VITALS — BODY MASS INDEX: 30.23 KG/M2 | WEIGHT: 160 LBS

## 2021-05-31 VITALS — BODY MASS INDEX: 28.91 KG/M2 | WEIGHT: 153 LBS

## 2021-05-31 VITALS — WEIGHT: 163.4 LBS | BODY MASS INDEX: 30.85 KG/M2 | HEIGHT: 61 IN

## 2021-05-31 VITALS — BODY MASS INDEX: 29.76 KG/M2 | WEIGHT: 157.5 LBS

## 2021-05-31 VITALS — WEIGHT: 167.6 LBS | BODY MASS INDEX: 31.67 KG/M2

## 2021-05-31 VITALS — WEIGHT: 155.31 LBS | BODY MASS INDEX: 29.35 KG/M2

## 2021-05-31 VITALS — HEIGHT: 61 IN | WEIGHT: 163 LBS | BODY MASS INDEX: 30.78 KG/M2

## 2021-05-31 VITALS — BODY MASS INDEX: 31.72 KG/M2 | HEIGHT: 61 IN | WEIGHT: 168 LBS

## 2021-05-31 VITALS — WEIGHT: 165.5 LBS | BODY MASS INDEX: 31.27 KG/M2

## 2021-05-31 VITALS — WEIGHT: 154.9 LBS | BODY MASS INDEX: 29.27 KG/M2

## 2021-05-31 VITALS — WEIGHT: 169.9 LBS | BODY MASS INDEX: 32.1 KG/M2

## 2021-05-31 VITALS — BODY MASS INDEX: 30.27 KG/M2 | WEIGHT: 160.19 LBS

## 2021-05-31 VITALS — WEIGHT: 167.8 LBS | BODY MASS INDEX: 31.71 KG/M2

## 2021-05-31 VITALS — WEIGHT: 167 LBS | BODY MASS INDEX: 31.55 KG/M2

## 2021-05-31 VITALS — WEIGHT: 163 LBS | BODY MASS INDEX: 30.78 KG/M2 | HEIGHT: 61 IN

## 2021-05-31 NOTE — PROGRESS NOTES
This is a 64 y.o. woman who comes in for  continued follow-up of her bilateral breast cancer.  She is now 5 years  status post left lumpectomy with radiation in 2-1/2 years status post right lumpectomy followed by radiation..  She is feeling well.  She has no new complaints today.      Please see the chart review for PMH, Meds, allergies, FH and SH.    ROS:  A 12 point comprehensive review of systems was negative except as noted.      Physical Exam:  There were no vitals taken for this visit.  General appearance: alert, appears stated age and cooperative  Breasts: There are no palpable masses. There are minimal radiation changes. The scars have healed up well.  She still has this skin changes in the left side with some patchy areas of erythema.  But no skin thickening.  Mild lymphedema bilaterally.  Lymph nodes: Cervical, supraclavicular, and axillary nodes normal.  Neurologic: Grossly normal    Data Review: Reviewed her current mammograms. No evidence of disease.      Impression: Personal History of breast cancer. NOD.  Is overall doing very well.  Because of her high-grade disease and the fact it is bilateral I would like to continue to see her.    Plan: Follow up 1 year with bilateral mammograms.

## 2021-06-01 VITALS — BODY MASS INDEX: 31.76 KG/M2 | WEIGHT: 168.1 LBS

## 2021-06-01 VITALS — WEIGHT: 165.6 LBS | HEIGHT: 61 IN | BODY MASS INDEX: 31.26 KG/M2

## 2021-06-01 VITALS — WEIGHT: 166.6 LBS | BODY MASS INDEX: 31.45 KG/M2 | HEIGHT: 61 IN

## 2021-06-01 VITALS — WEIGHT: 169.5 LBS | BODY MASS INDEX: 32.03 KG/M2

## 2021-06-01 VITALS — WEIGHT: 166.8 LBS | BODY MASS INDEX: 31.52 KG/M2

## 2021-06-01 VITALS — BODY MASS INDEX: 31.63 KG/M2 | WEIGHT: 167.4 LBS

## 2021-06-01 VITALS — BODY MASS INDEX: 31.59 KG/M2 | WEIGHT: 167.2 LBS

## 2021-06-01 VITALS — BODY MASS INDEX: 31.93 KG/M2 | WEIGHT: 169 LBS

## 2021-06-01 VITALS — HEIGHT: 61 IN | BODY MASS INDEX: 31.59 KG/M2 | WEIGHT: 167.3 LBS

## 2021-06-01 VITALS — WEIGHT: 165.6 LBS | BODY MASS INDEX: 31.29 KG/M2

## 2021-06-01 VITALS — BODY MASS INDEX: 31.82 KG/M2 | WEIGHT: 168.4 LBS

## 2021-06-01 NOTE — PROGRESS NOTES
[facility] Hematology and Oncology Progress Note    Patient: Willa Light  MRN: [id number]  Date of Service: Second 2019      Assessment and Plan:    Breast cancer    Primary site: Breast (Left)    Staging method: AJCC 7th Edition    Clinical: Stage IA (T1c, N0, cM0) - Signed by Moise Dee MD on 8/13/2014    Pathologic free text: [id number]    Summary: Stage IA (T1c, N0, cM0)    1. Breast cancer: He is without any evidence of recurrent disease.  We will continue to see her in 6 months.  Recent mammogram was negative.  Most recent treatment with chemotherapy was about 1 year and 9 months ago.    2.  Chemotherapy-induced peripheral neuropathy: Stable.  Grade 1.    ECOG Performance   ECOG Performance Status: 0    Distress Assessment  Distress Assessment Score: 1    Pain  Currently in Pain: No/denies    Diagnosis:    1. Invasive ductal carcinoma of the left breast:  Triple negative. Stage [id number]. Diagnosed July 2014. She had a positive margin at initial surgery. BRCA-.    2.  Invasive ductal carcinoma of the right breast: Triple negative.  Grade 3.  Clinical stage T2N1M0.  Diagnosed July 2017.  MRI shows 3 foci and probable intervening tumor between these 3 foci.  Total size of abnormality in the MRI was 4.2 cm.  One suspicious-appearing node in the axilla.    3.  Pneumonitis: CT on October 11 shows diffuse bilateral mixed airspace and interstitial infiltrates.  Possibly due to chemotherapy.  Bronchoscopy was done on October 20.  Cultures negative.  She seemed to respond to steroids.    Treatment:    She had a lumpectomy followed by 4 cycles of Taxotere and Cytoxan. This was completed in October 2014. She then had a reresection for the positive margin. She then had adjuvant radiation completed in February 2015.     She was treated with neoadjuvant Adriamycin and Cytoxan for her second, most recent diagnosis.  Chemotherapy started on August 16, 2017.  Cycle 4 given on September 27.  Weekly Taxol started  October 11.  Taxol reduced 25% for neuropathy.  Last was completed on January 17, 2018    Right-sided lumpectomy performed in February 12, 2018.  Pathology showed complete response.  No residual disease.  Adjuvant radiation completed on May 11, 2018    Interim History:    Willa is here for follow-up visit.  We last saw her 6 months ago.  In the interim she is been doing well.  No significant changes in her health.  Energy and appetite are okay.  No headaches or bony pain.  No chest pain, shortness of breath, or cough.     Review of Systems:    Constitutional  Constitutional (WDL): Exceptions to WDL  Fatigue: Fatigue relieved by rest  Neurosensory  Neurosensory (WDL): Exceptions to WDL  Ataxia: Asymptomatic, clinical or diagnostic observations only, intervention not indicated  Peripheral Sensory Neuropathy: Asymptomatic, loss of deep tendon reflexes or paresthesia  Cardiovascular  Cardiovascular (WDL): All cardiovascular elements are within defined limits  Pulmonary  Respiratory (WDL): Exceptions to WDL  Cough: Mild symptoms, nonprescription intervention indicated(seasonal)  Dyspnea: Shortness of breath with moderate exertion  Gastrointestinal  Gastrointestinal (WDL): All gastrointestinal elements are within defined limits  Genitourinary  Genitourinary (WDL): All genitourinary elements are within defined limits  Integumentary  Integumentary (WDL): Exceptions to WDL(possible thrush)  Patient Coping  Patient Coping: Accepting  Accompanied by  Accompanied by: Alone    Past History:    Past Medical History:   Diagnosis Date     Asthma      Breast cancer (H) 2014    left     Breast cancer (H) 2017    right     Bronchitis, chronic (H)      Colon polyp 2009    one precancerous polyp     Cytoxan-induced pulmonary interstitial disease (H)      Disease of thyroid gland      GERD (gastroesophageal reflux disease)      Hx antineoplastic chemotherapy 2014     Hx of radiation therapy 2014     Peripheral neuropathy     hands and  feet     Pneumonia      Sinusitis      Uterine polyp      Physical Exam:    Recent Vitals 10/2/2019   Height -   Weight 168 lbs 11 oz   BSA (m2) 1.81 m2   /89   Pulse 90   Temp 98.4   Temp src 1   SpO2 95   Some recent data might be hidden     General: patient appears stated age of 64 y.o.. Nontoxic and in no distress.   HEENT: Head: atraumatic, normocephalic. Sclerae anicteric.  Chest:  Normal respiratory effort.  Cardiac:  No edema.  Regular rate and rhythm.  No murmur.  Abdomen: abdomen is non-distended  Extremities: normal tone and muscle bulk.  Skin: no lesions or rash. Warm and dry.   CNS: alert and oriented x3. Grossly non-focal.   Psychiatric: normal mood and affect.     Lab Results:    Recent Results (from the past 168 hour(s))   Comprehensive Metabolic Panel   Result Value Ref Range    Sodium 142 136 - 145 mmol/L    Potassium 3.5 3.5 - 5.0 mmol/L    Chloride 102 98 - 107 mmol/L    CO2 31 22 - 31 mmol/L    Anion Gap, Calculation 9 5 - 18 mmol/L    Glucose 95 70 - 125 mg/dL    BUN 15 8 - 22 mg/dL    Creatinine 0.91 0.60 - 1.10 mg/dL    GFR MDRD Af Amer >60 >60 mL/min/1.73m2    GFR MDRD Non Af Amer >60 >60 mL/min/1.73m2    Bilirubin, Total 0.3 0.0 - 1.0 mg/dL    Calcium 9.4 8.5 - 10.5 mg/dL    Protein, Total 7.5 6.0 - 8.0 g/dL    Albumin 3.5 3.5 - 5.0 g/dL    Alkaline Phosphatase 130 (H) 45 - 120 U/L    AST 23 0 - 40 U/L    ALT 10 0 - 45 U/L   HM1 (CBC with Diff)   Result Value Ref Range    WBC 7.4 4.0 - 11.0 thou/uL    RBC 4.81 3.80 - 5.40 mill/uL    Hemoglobin 13.8 12.0 - 16.0 g/dL    Hematocrit 43.2 35.0 - 47.0 %    MCV 90 80 - 100 fL    MCH 28.7 27.0 - 34.0 pg    MCHC 31.9 (L) 32.0 - 36.0 g/dL    RDW 14.5 11.0 - 14.5 %    Platelets 248 140 - 440 thou/uL    MPV 9.6 8.5 - 12.5 fL    Neutrophils % 67 50 - 70 %    Lymphocytes % 19 (L) 20 - 40 %    Monocytes % 10 2 - 10 %    Eosinophils % 2 0 - 6 %    Basophils % 0 0 - 2 %    Neutrophils Absolute 5.0 2.0 - 7.7 thou/uL    Lymphocytes Absolute 1.4 0.8 -  4.4 thou/uL    Monocytes Absolute 0.8 0.0 - 0.9 thou/uL    Eosinophils Absolute 0.2 0.0 - 0.4 thou/uL    Basophils Absolute 0.0 0.0 - 0.2 thou/uL      Imaging:    No results found.      Signed by: Moise Dee MD

## 2021-06-02 VITALS — BODY MASS INDEX: 30.78 KG/M2 | WEIGHT: 163 LBS | HEIGHT: 61 IN

## 2021-06-02 VITALS — WEIGHT: 163.2 LBS | BODY MASS INDEX: 30.84 KG/M2

## 2021-06-02 VITALS — BODY MASS INDEX: 32.83 KG/M2 | HEIGHT: 61 IN | WEIGHT: 173.9 LBS

## 2021-06-02 VITALS — BODY MASS INDEX: 30.7 KG/M2 | WEIGHT: 162.5 LBS

## 2021-06-03 VITALS — BODY MASS INDEX: 30.96 KG/M2 | WEIGHT: 164 LBS | HEIGHT: 61 IN

## 2021-06-03 VITALS
HEART RATE: 90 BPM | SYSTOLIC BLOOD PRESSURE: 138 MMHG | TEMPERATURE: 98.4 F | DIASTOLIC BLOOD PRESSURE: 89 MMHG | BODY MASS INDEX: 31.88 KG/M2 | OXYGEN SATURATION: 95 % | WEIGHT: 168.7 LBS

## 2021-06-05 ENCOUNTER — RECORDS - HEALTHEAST (OUTPATIENT)
Dept: SCHEDULING | Facility: CLINIC | Age: 66
End: 2021-06-05

## 2021-06-05 DIAGNOSIS — C50.919 MALIGNANT NEOPLASM OF BREAST (FEMALE) (H): ICD-10-CM

## 2021-06-09 NOTE — PROGRESS NOTES
"Willa Light is a 65 y.o. female who is being evaluated via a billable video visit for breast cancer.    The patient has been notified of following:     \"This video visit will be conducted via a call between you and your physician/provider. We have found that certain health care needs can be provided without the need for an in-person physical exam.  This service lets us provide the care you need with a video conversation.  If a prescription is necessary we can send it directly to your pharmacy.  If lab work is needed we can place an order for that and you can then stop by our lab to have the test done at a later time.    Video visits are billed at different rates depending on your insurance coverage. Please reach out to your insurance provider with any questions.    If during the course of the call the physician/provider feels a video visit is not appropriate, you will not be charged for this service.\"    Patient has given verbal consent to a Video visit? Yes    Will anyone else be joining your video visit? No          Margaret Major RN         [facility] Hematology and Oncology Progress Note    Patient: Willa Light  MRN: [id number]  Date of Service: June 23, 2020      Assessment and Plan:    Breast cancer    Primary site: Breast (Left)    Staging method: AJCC 7th Edition    Clinical: Stage IA (T1c, N0, cM0) - Signed by Moise Dee MD on 8/13/2014    Pathologic free text: [id number]    Summary: Stage IA (T1c, N0, cM0)    1. Breast cancer: Doing well clinically.  Does not appear to have any signs or symptoms of recurrent disease.  We will continue six-month follow-up.  She is now 3 years out from her most recent diagnosis.  She has a mammogram scheduled for next month.  She will let us know in the interim if she develops any new symptoms.    2.  Chemotherapy-induced peripheral neuropathy: Stable.  Grade 1.    ECOG Performance   ECOG Performance Status: 0    Distress Assessment  Distress " Assessment Score: No distress    Pain  Currently in Pain: No/denies    Diagnosis:    1. Invasive ductal carcinoma of the left breast:  Triple negative. Stage [id number]. Diagnosed July 2014. She had a positive margin at initial surgery. BRCA-.    2.  Invasive ductal carcinoma of the right breast: Triple negative.  Grade 3.  Clinical stage T2N1M0.  Diagnosed July 2017.  MRI shows 3 foci and probable intervening tumor between these 3 foci.  Total size of abnormality in the MRI was 4.2 cm.  One suspicious-appearing node in the axilla.    3.  Pneumonitis: CT on October 11 shows diffuse bilateral mixed airspace and interstitial infiltrates.  Possibly due to chemotherapy.  Bronchoscopy was done on October 20.  Cultures negative.  She seemed to respond to steroids.    Treatment:    She had a lumpectomy followed by 4 cycles of Taxotere and Cytoxan. This was completed in October 2014. She then had a reresection for the positive margin. She then had adjuvant radiation completed in February 2015.     She was treated with neoadjuvant Adriamycin and Cytoxan for her second, most recent diagnosis.  Chemotherapy started on August 16, 2017.  Cycle 4 given on September 27.  Weekly Taxol started October 11.  Taxol reduced 25% for neuropathy.  Last was completed on January 17, 2018    Right-sided lumpectomy performed in February 12, 2018.  Pathology showed complete response.  No residual disease.  Adjuvant radiation completed on May 11, 2018    Interim History:    Willa is contacted for a phone visit.  In general she has been feeling well.  Denies headaches, chest pain, shortness of breath, bony pain, or lower extremity edema.  She still has peripheral neuropathy in the toes but it is improved.  No acute complaints today.    Review of Systems:    Constitutional  Constitutional (WDL): All constitutional elements are within defined limits  Neurosensory  Peripheral Sensory Neuropathy: Asymptomatic, loss of deep tendon reflexes or  paresthesia(hands if holding items too long)  Cardiovascular  Edema: Yes(left ankle)  Pulmonary  Respiratory (WDL): Within Defined Limits  Gastrointestinal  Gastrointestinal (WDL): All gastrointestinal elements are within defined limits  Genitourinary  Genitourinary (WDL): All genitourinary elements are within defined limits  Integumentary  Integumentary (WDL): All integumentary elements are within defined limits  Patient Coping  Patient Coping: Accepting  Accompanied by  Accompanied by: Alone    Past History:    Past Medical History:   Diagnosis Date     Asthma      Breast cancer (H) 2014    left     Breast cancer (H) 2017    right     Bronchitis, chronic (H)      Colon polyp 2009    one precancerous polyp     Cytoxan-induced pulmonary interstitial disease (H)      Disease of thyroid gland      GERD (gastroesophageal reflux disease)      Hx antineoplastic chemotherapy 2014     Hx of radiation therapy 2014     Peripheral neuropathy     hands and feet     Pneumonia      Sinusitis      Uterine polyp      Physical Exam:    Recent Vitals 10/2/2019   Height -   Weight 168 lbs 11 oz   BSA (m2) 1.81 m2   /89   Pulse 90   Temp 98.4   Temp src 1   SpO2 95   Some recent data might be hidden     Lab Results:    Recent Results (from the past 168 hour(s))   Comprehensive Metabolic Panel   Result Value Ref Range    Sodium 139 136 - 145 mmol/L    Potassium 4.6 3.5 - 5.0 mmol/L    Chloride 104 98 - 107 mmol/L    CO2 26 22 - 31 mmol/L    Anion Gap, Calculation 9 5 - 18 mmol/L    Glucose 97 70 - 125 mg/dL    BUN 17 8 - 22 mg/dL    Creatinine 0.82 0.60 - 1.10 mg/dL    GFR MDRD Af Amer >60 >60 mL/min/1.73m2    GFR MDRD Non Af Amer >60 >60 mL/min/1.73m2    Bilirubin, Total 0.2 0.0 - 1.0 mg/dL    Calcium 9.6 8.5 - 10.5 mg/dL    Protein, Total 7.7 6.0 - 8.0 g/dL    Albumin 3.7 3.5 - 5.0 g/dL    Alkaline Phosphatase 137 (H) 45 - 120 U/L    AST 24 0 - 40 U/L    ALT 9 0 - 45 U/L   HM1 (CBC with Diff)   Result Value Ref Range    WBC 9.2  4.0 - 11.0 thou/uL    RBC 4.87 3.80 - 5.40 mill/uL    Hemoglobin 13.5 12.0 - 16.0 g/dL    Hematocrit 43.4 35.0 - 47.0 %    MCV 89 80 - 100 fL    MCH 27.7 27.0 - 34.0 pg    MCHC 31.1 (L) 32.0 - 36.0 g/dL    RDW 14.7 (H) 11.0 - 14.5 %    Platelets 256 140 - 440 thou/uL    MPV 9.5 8.5 - 12.5 fL    Neutrophils % 69 50 - 70 %    Lymphocytes % 21 20 - 40 %    Monocytes % 7 2 - 10 %    Eosinophils % 2 0 - 6 %    Basophils % 0 0 - 2 %    Neutrophils Absolute 6.4 2.0 - 7.7 thou/uL    Lymphocytes Absolute 1.9 0.8 - 4.4 thou/uL    Monocytes Absolute 0.6 0.0 - 0.9 thou/uL    Eosinophils Absolute 0.2 0.0 - 0.4 thou/uL    Basophils Absolute 0.0 0.0 - 0.2 thou/uL      Imaging:    No results found.      Signed by: Moise Dee MD

## 2021-06-10 NOTE — PROGRESS NOTES
[facility] Hematology and Oncology Progress Note    Patient: Willa Light  MRN: [id number]  Date of Service:  May 2, 2017      Assessment and Plan:    Breast cancer    Primary site: Breast (Left)    Staging method: AJCC 7th Edition    Clinical: Stage IA (T1c, N0, cM0) - Signed by Moise Dee MD on 8/13/2014    Pathologic free text: [id number]    Summary: Stage IA (T1c, N0, cM0)    1. Breast cancer: She has 2-1/2 years out from completion of her chemotherapy.  She is not on any adjuvant therapy given triple negative status.  No clinical evidence of recurrent disease.  Continue six-month follow-up.  Next mammogram due in June of this year.    ECOG Performance   ECOG Performance Status: 0    Distress Assessment  Distress Assessment Score: 2 (general home things)    Pain  Currently in Pain: No/denies    Diagnosis:    1. Invasive ductal carcinoma of the left breast:  Triple negative. Stage [id number]. diagnosed July 2014. She had a positive margin at initial surgery. BRCA - .     Treatment:    She had a lumpectomy followed by 4 cycles of Taxotere and Cytoxan. This was completed in October 2014. She then had a reresection for the positive margin. She then had adjuvant radiation completed in February 2015.     Interim History:    Willa is here for follow-up visit.  Feeling okay today.  Has some tenderness over the left axilla.  No headaches or chest pain.  No shortness of breath.  No neurologic symptoms.  Energy and appetite are okay.    Review of Systems:    Constitutional  Constitutional (WDL): All constitutional elements are within defined limits  Neurosensory  Neurosensory (WDL): All neurosensory elements are within defined limits  Cardiovascular  Cardiovascular (WDL): All cardiovascular elements are within defined limits  Pulmonary  Respiratory (WDL): Within Defined Limits  Gastrointestinal  Gastrointestinal (WDL): All gastrointestinal elements are within defined limits  Genitourinary  Genitourinary  "(WDL): All genitourinary elements are within defined limits  Integumentary  Integumentary (WDL): All integumentary elements are within defined limits  Patient Coping  Patient Coping: Accepting  Accompanied by  Accompanied by: Alone    Past History:    Past Medical History:   Diagnosis Date     Asthma      Breast cancer      Bronchitis, chronic      Colon polyp      Disease of thyroid gland      GERD (gastroesophageal reflux disease)      Pneumonia      S/P lumpectomy, left breast 7/16/14     Sinusitis      Uterine polyp      Physical Exam:    Recent Vitals 5/2/2017   Height 5' 1.25\"   Weight 167 lbs 8 oz   BSA (m2) 1.81 m2   /89   Pulse 73   Temp 97.6   Temp src 1   SpO2 99     General: patient appears stated age of 61 y.o.. Nontoxic and in no distress.   HEENT: Head: atraumatic, normocephalic. Sclerae anicteric.   Chest:  Normal respiratory effort.   Cardiac:  No edema.   Abdomen: abdomen is soft, non-distended  Extremities: normal tone and muscle bulk.  Skin: no lesions or rash. Warm and dry.   CNS: alert and oriented x3. Grossly non-focal.   Psychiatric: normal mood and affect.   Breast exam: Negative.  No axillary nodes.    Lab Results:    Recent Results (from the past 168 hour(s))   Comprehensive Metabolic Panel   Result Value Ref Range    Sodium 143 136 - 145 mmol/L    Potassium 5.4 (H) 3.5 - 5.0 mmol/L    Chloride 107 98 - 107 mmol/L    CO2 29 22 - 31 mmol/L    Anion Gap, Calculation 7 5 - 18 mmol/L    Glucose 102 70 - 125 mg/dL    BUN 14 8 - 22 mg/dL    Creatinine 0.88 0.60 - 1.10 mg/dL    GFR MDRD Af Amer >60 >60 mL/min/1.73m2    GFR MDRD Non Af Amer >60 >60 mL/min/1.73m2    Bilirubin, Total 0.3 0.0 - 1.0 mg/dL    Calcium 9.3 8.5 - 10.5 mg/dL    Protein, Total 6.9 6.0 - 8.0 g/dL    Albumin 3.4 (L) 3.5 - 5.0 g/dL    Alkaline Phosphatase 117 45 - 120 U/L    AST 28 0 - 40 U/L    ALT 10 0 - 45 U/L   HM1 (CBC with Diff)   Result Value Ref Range    WBC 7.3 4.0 - 11.0 thou/uL    RBC 4.69 3.80 - 5.40 mill/uL "    Hemoglobin 13.3 12.0 - 16.0 g/dL    Hematocrit 42.1 35.0 - 47.0 %    MCV 90 80 - 100 fL    MCH 28.4 27.0 - 34.0 pg    MCHC 31.6 (L) 32.0 - 36.0 g/dL    RDW 14.7 (H) 11.0 - 14.5 %    Platelets 233 140 - 440 thou/uL    MPV 9.8 8.5 - 12.5 fL    Neutrophils % 72 (H) 50 - 70 %    Lymphocytes % 20 20 - 40 %    Monocytes % 6 2 - 10 %    Eosinophils % 2 0 - 6 %    Basophils % 0 0 - 2 %    Neutrophils Absolute 5.2 2.0 - 7.7 thou/uL    Lymphocytes Absolute 1.5 0.8 - 4.4 thou/uL    Monocytes Absolute 0.5 0.0 - 0.9 thou/uL    Eosinophils Absolute 0.1 0.0 - 0.4 thou/uL    Basophils Absolute 0.0 0.0 - 0.2 thou/uL     Imaging:    No results found.      Signed by: Moise Dee MD

## 2021-06-10 NOTE — PROGRESS NOTES
Willa presents to [facility] for a follow up appointment with Dr. Gan.  She is doing well.  Follows with Dr. Dee for Med onc, had a recent mammogram, see report for details. RN assessment and EMR update. Patient met with Dr. Gan, see dictation for details of visit. She will plan to follow up with a mammogram and Dr. Gan follow up appointment in one year.  RN time 12 mins

## 2021-06-10 NOTE — PROGRESS NOTES
This is a 65 y.o. woman who comes in for  continued follow-up of her bilateral breast cancer.  She is now 6 years  status post left partial mastectomy with radiation and 3-1/2 years status post right lumpectomy with radiation.  She is feeling well.  She has no new complaints today.      Please see the chart review for PMH, Meds, allergies, FH and SH.    ROS:  A 12 point comprehensive review of systems was negative except as noted.      Physical Exam:  There were no vitals taken for this visit.    General appearance: alert, appears stated age and cooperative  Breasts: There are no palpable masses. There are moderate radiation changes. The scars have healed up well.  Lymph nodes: Cervical, supraclavicular, and axillary nodes normal.  Neurologic: Grossly normal    Data Review: Reviewed her current mammograms. No evidence of disease.      Impression: Personal History of breast cancer. NOD.  Is overall doing very well.  Discussed with the patient that follow-up with myself could now be as needed.  She did have 2 very aggressive breast cancer so would feel more comfortable coming in to see me again on a yearly basis.    Plan: Follow up in 1 year with bilateral mammograms.

## 2021-06-11 NOTE — PROGRESS NOTES
This is a 62 y.o.  female who I'm asked to see by Homero Collier MD for evaluation of a right breast cancer.  This was picked up  on screening mammogram.  The patient cannot feel a mass. There were two lesions noted.  A needle biopsy was done which shows an invasive ductal carcinoma.  They are estrogen receptor negative, progesterone receptor negative and HER-2 negative She is about 3 years status post left lumpectomy followed by radiation and chemotherapy.  That was also a triple negative breast cancer.  She had genetic testing at the time and was found to be negative for the BRCA gene mutations.    She has no significant family history of breast cancer.      PAST MEDICAL HISTORY:  Past Medical History:   Diagnosis Date     Asthma      Breast cancer 2014    left     Breast cancer 2017    right     Bronchitis, chronic      Colon polyp      Disease of thyroid gland      GERD (gastroesophageal reflux disease)      Pneumonia      S/P lumpectomy, left breast 7/16/14     Sinusitis      Uterine polyp      Past Surgical History:   Procedure Laterality Date     BREAST LUMPECTOMY Left 07/2014    keft     FOOT ARTHRODESIS, MODIFIED CHAVEZ  may 2013 and oct 2013    hard to get hardware removed due to allergy to metal      POLYPECTOMY  2009    Uterine polyp removed        Medications:    Current Outpatient Prescriptions:      ALBUTEROL SULFATE INHL, Inhale 2 puffs every 4 (four) hours as needed. , Disp: , Rfl:      cetirizine (ZYRTEC) 10 MG tablet, Take 10 mg by mouth daily., Disp: , Rfl:      fexofenadine (ALLEGRA) 60 MG tablet, Take 60 mg by mouth daily., Disp: , Rfl:      montelukast (SINGULAIR) 10 mg tablet, Take 10 mg by mouth bedtime., Disp: , Rfl:      multivitamin therapeutic (THERAGRAN) tablet, Take 1 tablet by mouth daily., Disp: , Rfl:      omeprazole (PRILOSEC) 20 MG capsule, Take 20 mg by mouth daily. , Disp: , Rfl:     Allergies:  Allergies   Allergen Reactions     Codeine Nausea Only     Cortisone  (Hydrocortisone) [Hydrocortisone] Hives     Injection     Other Environmental Allergy      Seasonal Allergies, Mold      Pulmicort [Budesonide] Hives     Nickel Rash     Triamcinolone Rash       Social History:   reports that she has never smoked. She has never used smokeless tobacco. She reports that she drinks about 0.6 oz of alcohol per week  She reports that she does not use illicit drugs.    ROS:  A 12 point comprehensive review of systems was negative except as noted.    Physical Exam  BP (!) 135/92 (Patient Site: Right Arm, Patient Position: Sitting, Cuff Size: Adult Regular)  Pulse 83  Wt 166 lb (75.3 kg)  SpO2 97%  BMI 31.37 kg/m2  General:alert, appears stated age and cooperative  Lungs:clear to auscultation bilaterally  CV:regular rate and rhythm, S1, S2 normal, no murmur, click, rub or gallop  Breasts: There is a small palpable mass in the upper outer quadrant of the right breast but the patient states this is only been there since her biopsy.  No other palpable masses.  She is a well-healed scar in the left breast with some mild radiation changes on the left.  Lymph Nodes:no axillary nodes palpated  Neuro:Grossly normal      Reviewed her mammograms and ultrasound and pathology.     Impression: Right Breast Cancer.  This is clinically T1, N0, however it is triple negative and there are 2 lesions.  Discussed the surgical options for treatment of breast cancer which generally are a lumpectomy (partial mastectomy) combined with radiation versus a mastectomy.  Explained that the survival benefit is the same for both.  The difference is in local recurrence risk.  The patient is a Good candidate for a lumpectomy.  However, I think given the fact that this is another triple negative breast cancer I think this time we should do neoadjuvant chemotherapy.  I believe the plan is for her to get Cytoxan Adriamycin and then Taxol.  I think that we should do surgery.  Discussed SLN biopsy.  The procedure and  rationale were explained.  She has a trip planned in [place] and I think she can go ahead on that trip but then we should get her port in and start chemo soon after she gets back.      Plan: We will schedule her for a port placement for as soon as she is back from her trip.  She is then to follow-up with me when she is nearly done with her Taxol and then we will set her up for surgery.  I think she is a reasonable candidate again for lumpectomy and she is leaning that way.

## 2021-06-11 NOTE — PROGRESS NOTES
[facility] Hematology and Oncology Progress Note    Patient: Willa Light  MRN: [id number]  Date of Service:  July 14, 2017      Assessment and Plan:    Breast cancer    Primary site: Breast (Left)    Staging method: AJCC 7th Edition    Clinical: Stage IA (T1c, N0, cM0) - Signed by Moise Dee MD on 8/13/2014    Pathologic free text: [id number]-    Summary: Stage IA (T1c, N0, cM0)    1. Breast cancer: She has a new diagnosis of breast cancer in the right breast.  Picked up on mammography.  Biopsy showed triple negative disease.  2 separate areas on imaging but I told her this is probably just one confluent mass.  She will see Dr. Gan next week.  She would like to have breast conservation.  I told her that she will likely need chemotherapy again.  The regimen will depend on final tumor size and node status.  She is going on vacation to [place] soon.  Anticipate surgery when she returns.  We will see her 2-3 weeks.  To plan adjuvant therapy.    Left breast is okay on her recent mammogram.  She is just over 2-1/2 years out from completion of her chemotherapy.     ECOG Performance   ECOG Performance Status: 0    Distress Assessment  Distress Assessment Score: 5 (this appt; vacation planned)    Pain  Currently in Pain: No/denies    Diagnosis:    1. Invasive ductal carcinoma of the left breast:  Triple negative. Stage [id number]. Diagnosed July 2014. She had a positive margin at initial surgery. BRCA - .     Treatment:    She had a lumpectomy followed by 4 cycles of Taxotere and Cytoxan. This was completed in October 2014. She then had a reresection for the positive margin. She then had adjuvant radiation completed in February 2015.     Interim History:    Willa is here for follow-up visit.  I saw her just about 2 months ago.  She had a mammogram last month which showed some abnormalities in the right breast.  This is been worked up and biopsied.  Unfortunately she is found to have triple negative breast  "cancer in this breast now.  She is asymptomatic.  No other new complaints.    Review of Systems:    Constitutional  Constitutional (WDL): Exceptions to WDL  Weight Loss: to <10% from baseline, intervention not indicated (down 4lbs since 5/2/17)  Neurosensory  Neurosensory (WDL): All neurosensory elements are within defined limits  Cardiovascular  Cardiovascular (WDL): All cardiovascular elements are within defined limits  Pulmonary  Respiratory (WDL): Within Defined Limits  Gastrointestinal  Gastrointestinal (WDL): All gastrointestinal elements are within defined limits  Genitourinary  Genitourinary (WDL): All genitourinary elements are within defined limits  Integumentary  Integumentary (WDL): All integumentary elements are within defined limits  Patient Coping  Patient Coping: Accepting  Accompanied by  Accompanied by: Family Member ()    Past History:    Past Medical History:   Diagnosis Date     Asthma      Breast cancer      Bronchitis, chronic      Colon polyp      Disease of thyroid gland      GERD (gastroesophageal reflux disease)      Pneumonia      S/P lumpectomy, left breast 7/16/14     Sinusitis      Uterine polyp      Physical Exam:    Recent Vitals 7/14/2017   Height 5' 1\"   Weight 163 lbs 13 oz   BSA (m2) 1.79 m2   /83   Pulse 85   Temp 97.8   Temp src 1   SpO2 95   Some recent data might be hidden     General: patient appears stated age of 62 y.o.. Nontoxic and in no distress.   HEENT: Head: atraumatic, normocephalic. Sclerae anicteric.   Chest:  Normal respiratory effort.   Cardiac:  No edema.   Abdomen: abdomen is non-distended  Extremities: normal tone and muscle bulk.  Skin: no lesions or rash. Warm and dry.   CNS: alert and oriented x3. Grossly non-focal.   Psychiatric: normal mood and affect.     Lab Results:    Recent Results (from the past 168 hour(s))   Lipid Cascade   Result Value Ref Range    Cholesterol 166 <=199 mg/dL    Triglycerides 77 <=149 mg/dL    HDL Cholesterol 55 " >=50 mg/dL    LDL Calculated 96 <=129 mg/dL    Patient Fasting > 8hrs? Unknown    Comprehensive Metabolic Panel   Result Value Ref Range    Sodium 144 136 - 145 mmol/L    Potassium 4.7 3.5 - 5.0 mmol/L    Chloride 108 (H) 98 - 107 mmol/L    CO2 26 22 - 31 mmol/L    Anion Gap, Calculation 10 5 - 18 mmol/L    Glucose 87 70 - 125 mg/dL    BUN 21 8 - 22 mg/dL    Creatinine 0.83 0.60 - 1.10 mg/dL    GFR MDRD Af Amer >60 >60 mL/min/1.73m2    GFR MDRD Non Af Amer >60 >60 mL/min/1.73m2    Bilirubin, Total 0.3 0.0 - 1.0 mg/dL    Calcium 9.2 8.5 - 10.5 mg/dL    Protein, Total 7.0 6.0 - 8.0 g/dL    Albumin 3.5 3.5 - 5.0 g/dL    Alkaline Phosphatase 118 45 - 120 U/L    AST 26 0 - 40 U/L    ALT 8 0 - 45 U/L   Thyroid Stimulating Hormone (TSH)   Result Value Ref Range    TSH 1.01 0.30 - 5.00 uIU/mL   HM1 (CBC with Diff)   Result Value Ref Range    WBC 8.0 4.0 - 11.0 thou/uL    RBC 4.67 3.80 - 5.40 mill/uL    Hemoglobin 13.6 12.0 - 16.0 g/dL    Hematocrit 42.6 35.0 - 47.0 %    MCV 91 80 - 100 fL    MCH 29.1 27.0 - 34.0 pg    MCHC 31.9 (L) 32.0 - 36.0 g/dL    RDW 14.5 11.0 - 14.5 %    Platelets 258 140 - 440 thou/uL    MPV 11.1 8.5 - 12.5 fL    Neutrophils % 77 (H) 50 - 70 %    Lymphocytes % 15 (L) 20 - 40 %    Monocytes % 7 2 - 10 %    Eosinophils % 2 0 - 6 %    Basophils % 0 0 - 2 %    Neutrophils Absolute 6.1 2.0 - 7.7 thou/uL    Lymphocytes Absolute 1.2 0.8 - 4.4 thou/uL    Monocytes Absolute 0.5 0.0 - 0.9 thou/uL    Eosinophils Absolute 0.1 0.0 - 0.4 thou/uL    Basophils Absolute 0.0 0.0 - 0.2 thou/uL   Comprehensive Metabolic Panel   Result Value Ref Range    Sodium 144 136 - 145 mmol/L    Potassium 3.8 3.5 - 5.0 mmol/L    Chloride 107 98 - 107 mmol/L    CO2 26 22 - 31 mmol/L    Anion Gap, Calculation 11 5 - 18 mmol/L    Glucose 99 70 - 125 mg/dL    BUN 20 8 - 22 mg/dL    Creatinine 0.88 0.60 - 1.10 mg/dL    GFR MDRD Af Amer >60 >60 mL/min/1.73m2    GFR MDRD Non Af Amer >60 >60 mL/min/1.73m2    Bilirubin, Total 0.3 0.0 - 1.0  mg/dL    Calcium 9.2 8.5 - 10.5 mg/dL    Protein, Total 7.3 6.0 - 8.0 g/dL    Albumin 3.5 3.5 - 5.0 g/dL    Alkaline Phosphatase 116 45 - 120 U/L    AST 24 0 - 40 U/L    ALT 9 0 - 45 U/L   HM1 (CBC with Diff)   Result Value Ref Range    WBC 8.0 4.0 - 11.0 thou/uL    RBC 4.75 3.80 - 5.40 mill/uL    Hemoglobin 13.3 12.0 - 16.0 g/dL    Hematocrit 42.2 35.0 - 47.0 %    MCV 89 80 - 100 fL    MCH 28.0 27.0 - 34.0 pg    MCHC 31.5 (L) 32.0 - 36.0 g/dL    RDW 14.5 11.0 - 14.5 %    Platelets 258 140 - 440 thou/uL    MPV 10.2 8.5 - 12.5 fL    Neutrophils % 74 (H) 50 - 70 %    Lymphocytes % 19 (L) 20 - 40 %    Monocytes % 6 2 - 10 %    Eosinophils % 2 0 - 6 %    Basophils % 0 0 - 2 %    Neutrophils Absolute 5.8 2.0 - 7.7 thou/uL    Lymphocytes Absolute 1.5 0.8 - 4.4 thou/uL    Monocytes Absolute 0.5 0.0 - 0.9 thou/uL    Eosinophils Absolute 0.1 0.0 - 0.4 thou/uL    Basophils Absolute 0.0 0.0 - 0.2 thou/uL     Imaging:    No results found.      Signed by: Moise Dee MD

## 2021-06-12 NOTE — PROGRESS NOTES
[facility] Hematology and Oncology Progress Note    Patient: Willa Light  MRN: [id number]  Date of Service: 08/30/2017        Reason for Visit    Chief Complaint   Patient presents with     HE Cancer     Breast cancer       Assessment and Plan  Breast cancer    Staging form: Breast, AJCC 7th Edition    - Clinical: Stage IA (T1c, N0, cM0) - Signed by Moise Dee MD on 8/13/2014    - Pathologic: ER-AZ-Her2- - Signed by Zaira Luna CNP on 10/15/2014    Malignant neoplasm of right female breast    Staging form: Breast, AJCC 7th Edition    - Clinical: Stage IIB (T2(3), N1, M0) - Signed by Zaira Luna CNP on 8/30/2017          Prognostic indicators: Triple Negative    1. Breast cancer: hx of breast cancer of left breast in 2014, now with right sided breast cancer, clinically stage 2. She has started neoadjuvant chemo with dose dense AC. She will get cycle 2 today at full dose. She will return in 2 weeks for cycle 3.     2. Elevated alk phos: likely from chemo. Mild. Continue to monitor with chemo.     3. Heartburn/nausea: chemo induced. zofran made things worse. Do small, frequent meals with snacking. Janae products, peppermint products. [place] foods.     ECOG Performance   ECOG Performance Status: 0     Distress Assessment  Distress Assessment Score: 4    Pain  Currently in Pain: No/denies      Problem List    1. Malignant neoplasm of upper-outer quadrant of right female breast  CC OFFICE VISIT LONG    Infusion Appointment   2. Elevated alkaline phosphatase level     3. Heartburn       ______________________________________________________________________________    History of Present Illness    Diagnosis:     1. Invasive ductal carcinoma of the left breast:  Triple negative. Stage [id number]. Diagnosed July 2014. She had a positive margin at initial surgery. BRCA - .   2.  Invasive ductal carcinoma of the right breast: Triple negative.  Grade 3.  Clinical stage T2N1M0. Diagnosed July  2017.     Current Treatment:     Has started Dose Dense AC. Today is cycle 2.      Past Treatment:     She had a lumpectomy followed by 4 cycles of Taxotere and Cytoxan. This was completed in October 2014. She then had a reresection for the positive margin. She then had adjuvant radiation completed in February 2015.      Interim History:  Pt is here today to continue on chemo. She states it was pretty awful for 5 days. The antiemetics were making things worse. She has also had heartburn with acid reflux. This was going on before chemo as well. She is a little emotional about losing her hair again. Denies fevers. Denies headaches. She cannot feel tumor, but did have some feelings of altered sensation in her breast after the chemo.       Pain Status  Currently in Pain: No/denies    Review of Systems    Constitutional  Constitutional (WDL): Exceptions to WDL  Fatigue: Fatigue relieved by rest  Neurosensory  Neurosensory (WDL): All neurosensory elements are within defined limits  Eye   Eye Disorder (WDL): Exceptions to WDL  Blurred Vision: Intervention not indicated (A bit blurry post Tx last time.)  Ear  Ear Disorder (WDL): All ear disorder elements are within defined limits  Cardiovascular  Cardiovascular (WDL): All cardiovascular elements are within defined limits  Pulmonary  Respiratory (WDL): Exceptions to WDL  Cough: Mild symptoms, nonprescription intervention indicated  Gastrointestinal  Gastrointestinal (WDL): Exceptions to WDL  Dysphagia: Symptomatic, able to eat regular diet (Getting a bit better.)  Genitourinary  Genitourinary (WDL): All genitourinary elements are within defined limits  Lymphatic  Lymph (WDL): All lymph disorder elements are within defined limits  Musculoskeletal and Connective Tissue     Integumentary  Integumentary (WDL): All integumentary elements are within defined limits  Patient Coping  Patient Coping: Accepting  Distress Assessment  Distress Assessment Score: 4  Accompanied  by  Accompanied by: Alone  Oral Chemo Adherence       Past History  Past Medical History:   Diagnosis Date     Asthma      Breast cancer 2014    left     Breast cancer 2017    right     Bronchitis, chronic      Colon polyp      Disease of thyroid gland      GERD (gastroesophageal reflux disease)      Pneumonia      S/P lumpectomy, left breast 7/16/14     Sinusitis      Uterine polyp        PHYSICAL EXAM:  /80  Pulse 86  Temp 98.1  F (36.7  C) (Oral)   Wt 160 lb (72.6 kg)  SpO2 97%  BMI 30.23 kg/m2  GENERAL: no acute distress. Cooperative in conversation. Here alone  HEENT: pupils are equal, round and reactive. Oromucosa is clean and intact. No ulcerations or mucositis noted. No bleeding noted.  RESP: lungs are clear bilaterally per auscultation. Regular respiratory rate. No wheezes or rhonchi.  CV: Regular, rate and rhythm. No murmurs.  ABD: soft, nontender. Positive bowel sounds. No organomegaly.   MUSCULOSKELETAL: No lower extremity swelling.   NEURO: non focal. Alert and oriented x3.   PSYCH: within normal limits. No depression or anxiety.  SKIN: warm dry intact, port is CDI in left chest wall.   LYMPH: no cervical, supraclavicular or axillary lymphadenopathy          Lab Results    Recent Results (from the past 168 hour(s))   HM1 (CBC with Diff)   Result Value Ref Range    WBC 0.6 (LL) 4.0 - 11.0 thou/uL    RBC 4.40 3.80 - 5.40 mill/uL    Hemoglobin 12.2 12.0 - 16.0 g/dL    Hematocrit 38.4 35.0 - 47.0 %    MCV 87 80 - 100 fL    MCH 27.7 27.0 - 34.0 pg    MCHC 31.8 (L) 32.0 - 36.0 g/dL    RDW 14.5 11.0 - 14.5 %    Platelets 132 (L) 140 - 440 thou/uL    MPV 10.4 8.5 - 12.5 fL   Manual Differential   Result Value Ref Range    Total Neutrophils % 4 (L) 50 - 70 %    Lymphocytes % 56 (H) 20 - 40 %    Monocytes % 16 (H) 2 - 10 %    Eosinophils %  24 (H) 0 - 6 %    Basophils % 0 0 - 2 %    Total Neutrophils Absolute 0.0 (L) 2.0 - 7.7 thou/ul    Lymphocytes Absolute 0.3 (L) 0.8 - 4.4 thou/uL    Monocytes  Absolute 0.1 0.0 - 0.9 thou/uL    Eosinophils Absolute 0.1 0.0 - 0.4 thou/uL    Basophils Absolute 0.0 0.0 - 0.2 thou/uL    Platelet Estimate Decreased (!) Normal   Comprehensive Metabolic Panel   Result Value Ref Range    Sodium 142 136 - 145 mmol/L    Potassium 3.6 3.5 - 5.0 mmol/L    Chloride 106 98 - 107 mmol/L    CO2 27 22 - 31 mmol/L    Anion Gap, Calculation 9 5 - 18 mmol/L    Glucose 116 70 - 125 mg/dL    BUN 15 8 - 22 mg/dL    Creatinine 0.83 0.60 - 1.10 mg/dL    GFR MDRD Af Amer >60 >60 mL/min/1.73m2    GFR MDRD Non Af Amer >60 >60 mL/min/1.73m2    Bilirubin, Total 0.2 0.0 - 1.0 mg/dL    Calcium 9.2 8.5 - 10.5 mg/dL    Protein, Total 7.2 6.0 - 8.0 g/dL    Albumin 3.4 (L) 3.5 - 5.0 g/dL    Alkaline Phosphatase 128 (H) 45 - 120 U/L    AST 21 0 - 40 U/L    ALT 8 0 - 45 U/L   HM1 (CBC with Diff)   Result Value Ref Range    WBC 9.2 4.0 - 11.0 thou/uL    RBC 4.38 3.80 - 5.40 mill/uL    Hemoglobin 12.4 12.0 - 16.0 g/dL    Hematocrit 37.8 35.0 - 47.0 %    MCV 86 80 - 100 fL    MCH 28.3 27.0 - 34.0 pg    MCHC 32.8 32.0 - 36.0 g/dL    RDW 15.4 (H) 11.0 - 14.5 %    Platelets 220 140 - 440 thou/uL    MPV 9.5 8.5 - 12.5 fL       Imaging    Mr Breast Bilateral With Without Contras    Result Date: 8/4/2017  MR BREAST W WO CONTRAST BILATERAL 8/3/2017 4:16 PM INDICATION: Newly diagnosed right breast cancer. History of left breast cancer status post breast conserving therapy in 2014. TECHNIQUE: 3D high-resolution images, vascular subtraction images with 7 mL Gadavist and MIP images performed. The study was processed using a Verite[employer] computer-aided detection system to optimize radiologist interpretation by generating multiplanar and 3D reconstructions, creating subtraction images from the dynamic contrast data, and computing tumor volumes and dimensions. COMPARISON: Mammogram and ultrasound on 7/5/2017, mammograms dated 6/27/2017 and 6/23/2016 RIGHT BREAST: There is heterogeneous fibroglandular tissue. There is minimal  background enhancement. At the site of biopsy-proven malignancy at the 10:00 position, zone 2 there are 3 rapidly enhancing masses with washout kinetics. Findings are consistent  with multifocal malignancy. Specific masses are as follows; most superior mass measuring 1.3 x 0.9 x 1.3 cm; mass a few millimeters inferior and medial to this measuring 1.0 x 1.1 x 1.0 cm; finally, there is a small satellite mass at the posterior aspect measuring 0.7 cm. There is suspicious nonmasslike enhancement intervening between the masses and in the surrounding breast tissue. Overall the area of involvement measures 4.2 x 2.9 x 2.8 cm. The remainder of the breast tissue is unremarkable. There is a 1.2 x 1.3 cm axillary lymph node with irregular morphology (CAD stream subtraction image 77 and series 16 image 77). Additionally, there are a couple subcentimeter round axillary 1 lymph nodes with no identifiable fatty hilum. MIP images confirm these findings. LEFT BREAST: There is heterogeneous fibroglandular tissue. There is minimal background enhancement. Post lumpectomy changes in the posterior, slightly lateral breast. No suspicious mass or enhancement. The axilla is unremarkable. MIP images confirm these  findings.     1. Findings are consistent with right breast multifocal malignancy. Area of involvement measures up to 4.2 cm as detailed above. 2. At least one suspicious right axillary lymph node. A couple other small round lymph nodes are mildly suspicious. 3. Breast conserving therapy changes on the left. No MRI evidence for malignancy in the left breast. RIGHT BREAST: ACR BI-RADS Category 6: Known Biopsy-Proven Malignancy. LEFT BREAST: ACR BI-RADS Category 2: Benign.        Signed by: Zaira Luna, APURVA

## 2021-06-12 NOTE — PROGRESS NOTES
Pt ambulates to infusion center for C1D1 treatment.  Pt saw Dr. Dee and had labs drawn on 8/9/17 and voices no new concerns today.  IVAD accessed with good blood return noted.  Pt refuses Dexamethasone pre-med due to allergy.  All other pre-meds, Adriamycin and Cytoxan infused as ordered without complication.  Pt watched Neulasta auto-injector video and questions addressed.  Neulasta on body injector applied as ordered.  IVAD flushed w/NS et heparin and needle deaccessed.  Discharge instructions reviewed w/pt and handout given.  Pt left clinic stable to lobby.  Plan RTC as scheduled.

## 2021-06-12 NOTE — PROGRESS NOTES
Willa is scheduled for port placement with Dr. Gan on 8/2/17 at [facility]. Patient was given instructions of arrival time, need a ,and NPO after midnight. Patient verbalized understanding.     Laila Williamson [facility]  Physician    [facility]   [phone]

## 2021-06-12 NOTE — PROGRESS NOTES
Met with Willa and her spouse Kyle in medical oncology exam room where they have arrived for follow up with Dr. Dee.  Gave them my card and explained the role of navigation in our setting.  Willa recalls working with predecessor Belia Pisano.  We reviewed the Class Calendar and Breast Cancer Support Group schedule and location.      They have some questions about health insurance and HE CC.  These will be directed to Syeda Awad, financial counselor.  Angélica Abdalla RN

## 2021-06-12 NOTE — PROGRESS NOTES
Pt ambulates to infusion center for lab draw prior to NP visit.  IVAD accessed, labs drawn et sent.  Pt seen by SUJATHA Luna CNP and orders approved.  Pt returns to infusion center for treatment.  Pre-meds, Adriamycin and Cytoxan infused as ordered without complication.  IVAD flushed w/NS et heparin and needle deaccessed.  Neulasta auto-injector applied as ordered.  Pt left clinic stable to [facility].  Plan RTC as scheduled.

## 2021-06-12 NOTE — PROGRESS NOTES
[facility] Hematology and Oncology Progress Note    Patient: Willa Light  MRN: [id number]  Date of Service:  April 8, 2017      Assessment and Plan:    Breast cancer    Primary site: Breast (Left)    Staging method: AJCC 7th Edition    Clinical: Stage IA (T1c, N0, cM0) - Signed by Moise Dee MD on 8/13/2014    Pathologic free text: [id number]    Summary: Stage IA (T1c, N0, cM0)    1. Breast cancer: We reviewed her MRI.  She has 3 foci and probably intervening tumor continuing these 3.  Total size of abnormality in the MRI was 4.2 cm.  She has 1 suspicious-appearing node in the right axilla.  We are going to proceed with neoadjuvant chemotherapy.  Given the size of the tumor and the likely node positivity she will be treated with dose dense Adriamycin and Cytoxan followed by weekly Taxol.  The plan is reviewed with the patient and her . We talked about the chemotherapy regimen and is expected side effects.  She was given written information on the drugs to take home and review.  Informed consent was signed.  She will get an echocardiogram.  Her Port-A-Cath has been placed.  We will start next Wednesday.  She does have a allergy to steroids on the record.  I will decrease her premed steroid dose to 8 mg.  We will see how she does.  If okay then will bump up to 12 mg for cycles 2 through 4. Questions were answered.    ECOG Performance   ECOG Performance Status: 0    Distress Assessment  Distress Assessment Score: 5 (plan going forward)    Pain  Currently in Pain: No/denies    Diagnosis:    1. Invasive ductal carcinoma of the left breast:  Triple negative. Stage [id number]. Diagnosed July 2014. She had a positive margin at initial surgery. BRCA - .   2.  Invasive ductal carcinoma of the right breast: Triple negative.  Grade 3.  Clinical stage T2N1M0.     Treatment:    She had a lumpectomy followed by 4 cycles of Taxotere and Cytoxan. This was completed in October 2014. She then had a reresection  "for the positive margin. She then had adjuvant radiation completed in February 2015.     Interim History:    Willa is here for follow-up visit.  She just got back from a trip to [place].  She had a good time.  Weather was good.  She had a breast MRI a few days ago.  She also recently had a port placed.  Overall she is feeling okay.  No new areas of pain or headache.  Energy and appetite are okay.  She is just getting over a viral upper respiratory infection.    Review of Systems:    Constitutional  Constitutional (WDL): Exceptions to WDL  Fatigue: Fatigue not relieved by rest - Limiting instrumental ADL  Weight Loss: to <10% from baseline, intervention not indicated (down 3lbs since 7/18)  Neurosensory  Neurosensory (WDL): All neurosensory elements are within defined limits  Cardiovascular  Cardiovascular (WDL): All cardiovascular elements are within defined limits  Pulmonary  Respiratory (WDL): Within Defined Limits  Gastrointestinal  Gastrointestinal (WDL): All gastrointestinal elements are within defined limits  Genitourinary  Genitourinary (WDL): All genitourinary elements are within defined limits  Integumentary  Integumentary (WDL): All integumentary elements are within defined limits  Patient Coping  Patient Coping: Accepting  Accompanied by  Accompanied by: Family Member ()    Past History:    Past Medical History:   Diagnosis Date     Asthma      Breast cancer 2014    left     Breast cancer 2017    right     Bronchitis, chronic      Colon polyp      Disease of thyroid gland      GERD (gastroesophageal reflux disease)      Pneumonia      S/P lumpectomy, left breast 7/16/14     Sinusitis      Uterine polyp      Physical Exam:    Recent Vitals 8/8/2017   Height 5' 1\"   Weight 163 lbs 6 oz   BSA (m2) 1.79 m2   /84   Pulse 84   Temp 97.9   Temp src 1   SpO2 96   Some recent data might be hidden     General: patient appears stated age of 62 y.o.. Nontoxic and in no distress.   HEENT: Head: " atraumatic, normocephalic. Sclerae anicteric.   Chest:  Normal respiratory effort.   Cardiac:  No edema.   Abdomen: abdomen is soft, non-distended  Extremities: normal tone and muscle bulk.  Skin: no lesions or rash. Warm and dry.   CNS: alert and oriented x3. Grossly non-focal.   Psychiatric: normal mood and affect.     Lab Results:    Recent Results (from the past 168 hour(s))   POCT creatinine   Result Value Ref Range    POC Creatinine 0.9 mg/dL   POCT creatinine   Result Value Ref Range    POC Creatinine 0.9 0.6 - 1.1 mg/dL   POCT GFR   Result Value Ref Range    POC GFR AMER AF HE >60  >60 mL/min/1.73m2    POC GFR NON AMER AF >60  >60 mL/min/1.73m2   Comprehensive Metabolic Panel   Result Value Ref Range    Sodium 140 136 - 145 mmol/L    Potassium 4.1 3.5 - 5.0 mmol/L    Chloride 104 98 - 107 mmol/L    CO2 27 22 - 31 mmol/L    Anion Gap, Calculation 9 5 - 18 mmol/L    Glucose 98 70 - 125 mg/dL    BUN 26 (H) 8 - 22 mg/dL    Creatinine 0.80 0.60 - 1.10 mg/dL    GFR MDRD Af Amer >60 >60 mL/min/1.73m2    GFR MDRD Non Af Amer >60 >60 mL/min/1.73m2    Bilirubin, Total 0.3 0.0 - 1.0 mg/dL    Calcium 9.2 8.5 - 10.5 mg/dL    Protein, Total 7.7 6.0 - 8.0 g/dL    Albumin 3.4 (L) 3.5 - 5.0 g/dL    Alkaline Phosphatase 113 45 - 120 U/L    AST 27 0 - 40 U/L    ALT 10 0 - 45 U/L   HM1 (CBC with Diff)   Result Value Ref Range    WBC 9.4 4.0 - 11.0 thou/uL    RBC 4.64 3.80 - 5.40 mill/uL    Hemoglobin 13.0 12.0 - 16.0 g/dL    Hematocrit 40.1 35.0 - 47.0 %    MCV 86 80 - 100 fL    MCH 28.0 27.0 - 34.0 pg    MCHC 32.4 32.0 - 36.0 g/dL    RDW 14.2 11.0 - 14.5 %    Platelets 229 140 - 440 thou/uL    MPV 9.7 8.5 - 12.5 fL    Neutrophils % 72 (H) 50 - 70 %    Lymphocytes % 19 (L) 20 - 40 %    Monocytes % 7 2 - 10 %    Eosinophils % 2 0 - 6 %    Basophils % 0 0 - 2 %    Neutrophils Absolute 6.7 2.0 - 7.7 thou/uL    Lymphocytes Absolute 1.8 0.8 - 4.4 thou/uL    Monocytes Absolute 0.6 0.0 - 0.9 thou/uL    Eosinophils Absolute 0.2 0.0 -  0.4 thou/uL    Basophils Absolute 0.0 0.0 - 0.2 thou/uL     Imaging:    MRI images were personally reviewed.  3 distinct tumors are noted in the right breast.  One ill-defined right axillary node.    Mr Breast Bilateral With Without Contras    Result Date: 8/4/2017  MR BREAST W WO CONTRAST BILATERAL 8/3/2017 4:16 PM INDICATION: Newly diagnosed right breast cancer. History of left breast cancer status post breast conserving therapy in 2014. TECHNIQUE: 3D high-resolution images, vascular subtraction images with 7 mL Gadavist and MIP images performed. The study was processed using a IDverge computer-aided detection system to optimize radiologist interpretation by generating multiplanar and 3D reconstructions, creating subtraction images from the dynamic contrast data, and computing tumor volumes and dimensions. COMPARISON: Mammogram and ultrasound on 7/5/2017, mammograms dated 6/27/2017 and 6/23/2016 RIGHT BREAST: There is heterogeneous fibroglandular tissue. There is minimal background enhancement. At the site of biopsy-proven malignancy at the 10:00 position, zone 2 there are 3 rapidly enhancing masses with washout kinetics. Findings are consistent  with multifocal malignancy. Specific masses are as follows; most superior mass measuring 1.3 x 0.9 x 1.3 cm; mass a few millimeters inferior and medial to this measuring 1.0 x 1.1 x 1.0 cm; finally, there is a small satellite mass at the posterior aspect measuring 0.7 cm. There is suspicious nonmasslike enhancement intervening between the masses and in the surrounding breast tissue. Overall the area of involvement measures 4.2 x 2.9 x 2.8 cm. The remainder of the breast tissue is unremarkable. There is a 1.2 x 1.3 cm axillary lymph node with irregular morphology (CAD stream subtraction image 77 and series 16 image 77). Additionally, there are a couple subcentimeter round axillary 1 lymph nodes with no identifiable fatty hilum. MIP images confirm these findings. LEFT  BREAST: There is heterogeneous fibroglandular tissue. There is minimal background enhancement. Post lumpectomy changes in the posterior, slightly lateral breast. No suspicious mass or enhancement. The axilla is unremarkable. MIP images confirm these  findings.     1. Findings are consistent with right breast multifocal malignancy. Area of involvement measures up to 4.2 cm as detailed above. 2. At least one suspicious right axillary lymph node. A couple other small round lymph nodes are mildly suspicious. 3. Breast conserving therapy changes on the left. No MRI evidence for malignancy in the left breast. RIGHT BREAST: ACR BI-RADS Category 6: Known Biopsy-Proven Malignancy. LEFT BREAST: ACR BI-RADS Category 2: Benign.      Signed by: Moise Dee MD

## 2021-06-13 NOTE — PROGRESS NOTES
Pulmonary Clinic Consult Note  Date of Service: 10/18/2017    Reason for Consultation: abnormal CT chest    History:     HPI: Patient is a pleasant 61 yo F with h/o invasive ductal carcinoma Left breast stage [id number] dg 7/2014 s/p chemo, XRT, and lumpectomy (positive margin at initial surgery, followed by re-resection), and recently dg invasive ductal carcinoma right breast clinic state T2N1M0 dg 7/2017 who has finished 4 cycles of Adriamcin and Cytoxan and completed 1 cycle of Taxol and last week was referred to pulmonary clinic for fever and cough. Pt started chemotherapy around mid-August. She was also started on neulasta.    Pt has been having an ongoing cough since July of 2017. Soon after the onset of her cough she had travelled to [place]. She received a course of abx while she was in [place]. Her cough persisted when she came back. She notes that she received another course of abx around mid-august and was recently started on a course of amoxicillin that was started last Thursdays. She will complete a 10 day course, and will be finishing her course on Sunday.  She had been placed on PPI and ranitidine for her cough without much improvement.    There was concern for potential TB exposure at oncology clinic recently. She had a quantiferon then done and this was indeterminate.     Along with her cough, pt endorses fevers (up to 101.6 yesterday) and sob with minimal exertion. She is able to walk about 1 block before she would have to rest. She feels weak and fatigued.     PMHx/PSHx:  Left breast - dg 2014 -  lumpectomy, chemo and xrt. 3 years ago.  Right breast -dg 7/2017. Chemo, plan for surgery but they are deciding one and will have radiation.    Past Medical History:   Diagnosis Date     Asthma      Breast cancer 2014    left     Breast cancer 2017    right     Bronchitis, chronic      Colon polyp      Disease of thyroid gland      GERD (gastroesophageal reflux disease)      Pneumonia      S/P lumpectomy, left  breast 7/16/14     Sinusitis      Uterine polyp      Past Surgical History:   Procedure Laterality Date     BREAST LUMPECTOMY Left 07/2014    keft     FOOT ARTHRODESIS, MODIFIED KATHY  may 2013 and oct 2013    hard to get hardware removed due to allergy to metal      POLYPECTOMY  2009    Uterine polyp removed        Social Hx:  Social History     Social History     Marital status:      Spouse name: N/A     Number of children: N/A     Years of education: N/A     Occupational History     Not on file.     Social History Main Topics     Smoking status: Never Smoker     Smokeless tobacco: Never Used     Alcohol use No     Drug use: No     Sexual activity: No     Other Topics Concern     Not on file     Social History Narrative         Family History   Problem Relation Age of Onset     Ovarian cancer Maternal Grandmother 60     biopsy showed adenocarcinoma, suspected ovarian primary     Colon cancer Maternal Grandfather 65     Breast cancer Cousin 50     triple-negative     Thyroid cancer Cousin 57     Hypertension Mother      Dementia Mother      Diabetes Father      Hypertension Father      Pneumonia Father      Hypertension Sister      Breast cancer Sister 69     Cancer Sister 69     Endometrial     Diabetes Brother      Hypertension Brother      Hypertension Paternal Grandmother      Stroke Paternal Grandmother      Stomach cancer Paternal Grandfather 70     Hypertension Brother      Diabetes Brother      Hypertension Sister      Colon cancer Maternal Aunt 75     Cancer Maternal Uncle      not otherwise specified     Diabetes Brother      Hypertension Son      Asthma Son        Review of Systems - 10 point review of system negative except for what is mentioned in the HPI.  Meds and Allergies:  See EHR for the updated medication list and Allergies. These were reviewed.     Exam/Data:   /76  Pulse (!) 110  Temp 99.4  F (37.4  C) (Oral)   Resp 20  Wt 153 lb (69.4 kg)  SpO2 94% Comment: RA  BMI 28.91  kg/m2    EXAM:  GEN: comfortable, NAD  HEENT: NCAT, EMOI, mmm  CVS: S1S2, RRR  Lung: scattered crackles  Abd: soft, nt, + BS. No masses  Ext: no c/c/e  Vasc: intact radial pulses bilaterally  Neuro: nonfocal  Skin: no visible rash  Musculoskeletal: FROM all extremities  Psych: normal affect    DATA    IMAGING:   Xr Chest Pa And Lateral    Result Date: 10/11/2017  XR CHEST PA AND LATERAL 10/11/2017, 12:31 PM INDICATION: Contact with and (suspected) exposure to tuberculosis. COMPARISON: 11/19/2014 chest radiograph. FINDINGS: Fine linear opacities radiating from both doroteo near the periphery of the mid and lower lungs could represent normal physiologic bronchopulmonary lung markings or represent minimal interstitial edema and/or inflammation. Partial obscuration of the lower right and left heart border secondary to prominent cardiac fat pad. Shallow inspiration exaggerates heart size which is within normal limits. Port-A-Cath anterior left chest wall with left subclavian vein central venous catheter tip in the mid SVC. Chest otherwise negative.     Ct Chest With Contrast    Result Date: 10/11/2017  CT CHEST W CONTRAST 10/11/2017 4:50 PM INDICATION: cough, fever TECHNIQUE: Routine chest. Dose reduction techniques were used. IV CONTRAST: Iohexol (Omni) 90mL COMPARISON: Chest radiograph dated 10/11/2017. FINDINGS: LUNGS AND PLEURA: There are mild diffuse bilateral mixed airspace interstitial opacities findings could be seen with pulmonary edema or bilateral pneumonitis. No pneumothorax or pleural effusion. No pulmonary nodule or mass. MEDIASTINUM: No mediastinal or hilar lymphadenopathy. No pericardial effusion. There is a Port-A-Cath catheter with tip in the superior vena cava near its junction with the right atrium. No coronary artery calcification. LIMITED UPPER ABDOMEN: Mild diffuse hepatic steatosis without focal hepatic abnormality. Visualized adrenal glands spleen and gallbladder are normal. MUSCULOSKELETAL:  Thoracolumbar scoliosis. No acute fracture no bony metastases identified.     CONCLUSION: 1.  Diffuse bilateral findings mixed airspace and interstitial infiltrates. Differential diagnosis would include infectious or inflammatory infiltrate versus pulmonary edema. NOTE: ABNORMAL REPORT THE DICTATION ABOVE DESCRIBES AN ABNORMALITY FOR WHICH FOLLOW-UP IS NEEDED.       Assessment/Plan:   Willa Light is a 62 y.o. female, lifelong nonsmoker, with h/o left breast cancer 7/2014 s/p chemo, XRT, and lumpectomy and recent dig of right breast cancer on 7/2017 on chemo (Adriamycin, Cytoxan, and Taxol) with ongoing fevers and cough x 3 months. There was concern for TB exposure and had an indeterminate quantiferon, but no AFB's. CT chest shows diffuse bilateral airspace disease. Pt has received 3 courses of abx (july, august, and currently). She is on a 10 day course of amoxicillin.  Pt's last wbc on 10/11 was elevated at 25 K but she has recently been started on Neulasta. At this time, giving the ongoing fevers and cough with sob, pt will need to have bronchoscopy for sampling. We are unable to have this done as outpt and thus have called Dr Dee and admitting and arranged for pt to come in to the hospital for admission and she is agreeable.     Recommendations:  Admit to general medicine  Isolation room given concern for tb  Repeat quantiferon (although not sure of value given that she is on chemo)  Will need bronch with BAL (afb, viral, bacterial, PCP, fungal, etc) and cytology  ID consult/pulm consult  Case d/w hospitalist and oncology  Bed control is working on bed    FOLLOW UP: 2-4 weeks    Diann Salas MD  Pulmonary and Critical Care Medicine  10/18/2017        Allergies   Allergen Reactions     Codeine Nausea Only     Cortisone (Hydrocortisone) [Hydrocortisone] Hives     Injection     Other Environmental Allergy      Seasonal Allergies, Mold      Pulmicort [Budesonide] Hives     Nickel Rash      Triamcinolone Rash     Zofran (As Hydrochloride) [Ondansetron Hcl] Nausea And Vomiting       Medications:     Current Outpatient Prescriptions   Medication Sig Dispense Refill     ALBUTEROL SULFATE INHL Inhale 2 puffs every 4 (four) hours as needed.        amoxicillin-clavulanate (AUGMENTIN) 875-125 mg per tablet Take 1 tablet by mouth 2 (two) times a day for 10 days. 20 tablet 0     CALCIUM CARB/MAGNESIUM HYDROX (MYLANTA ORAL) Take by mouth.       cetirizine (ZYRTEC) 10 MG tablet Take 10 mg by mouth daily.       fexofenadine (ALLEGRA) 60 MG tablet Take 60 mg by mouth as needed.        lidocaine-prilocaine (EMLA) cream Apply to port site 1 hour before port needle access. 15 g 1     montelukast (SINGULAIR) 10 mg tablet Take 10 mg by mouth bedtime.       multivitamin therapeutic (THERAGRAN) tablet Take 1 tablet by mouth daily.       pantoprazole (PROTONIX) 20 MG tablet TAKE 1 TABLET BY MOUTH DAILY 30 tablet 0     VENTOLIN HFA 90 mcg/actuation inhaler INHALE 2 PUFFS PO Q 4 H PRF WHEEZING  11     No current facility-administered medications for this visit.

## 2021-06-13 NOTE — PROGRESS NOTES
Pt ambulates to infusion center for lab draw prior to NP visit.  IVAD accessed, labs drawn et sent.  Pt seen by PATRICIO Low CNP and orders approved.  Pt returns to infusion center for treatment.  Pre-meds, Adriamycin and Cytoxan infused as ordered without complication.  Neulasta auto-injector applied as ordered.  IVAD flushed w/NS et heparin and needle deaccessed.  Pt left clinic stable to [facility].  Plan RTC as scheduled.

## 2021-06-13 NOTE — PROGRESS NOTES
Pt ambulates to infusion center for labs prior to MD visit.  IVAD accessed, labs drawn et sent.  Pt seen by Dr. Dee and orders approved.  Pt returns to infusion center for treatment.  Pre-meds and Taxol infused as ordered without complication.  IVAD flushed w/NS et heparin and needle deaccessed.  Pt left clinic stable to [facility].  Plan RTC as scheduled.   no

## 2021-06-13 NOTE — PROGRESS NOTES
"Willa Light is a 65 y.o. female who is being evaluated via a billable video visit.      The patient has been notified of following:     \"This video visit will be conducted via a call between you and your physician/provider. We have found that certain health care needs can be provided without the need for an in-person physical exam.  This service lets us provide the care you need with a video conversation.  If a prescription is necessary we can send it directly to your pharmacy.  If lab work is needed we can place an order for that and you can then stop by our lab to have the test done at a later time.    Video visits are billed at different rates depending on your insurance coverage. Please reach out to your insurance provider with any questions.    If during the course of the call the physician/provider feels a video visit is not appropriate, you will not be charged for this service.\"    Patient has given verbal consent to a Video visit? Yes  How would you like to obtain your AVS? AVS Preference: MyChart.  If dropped by the video visit, the video invitation should be sent to: Text to cell phone: [phone]  Will anyone else be joining your video visit? No          Samantha Gonzalez CMA.      Willa Light is a 65 y.o. female who is being evaluated via a billable video visit for breast cancer.    The patient has been notified of following:     \"This video visit will be conducted via a call between you and your physician/provider. We have found that certain health care needs can be provided without the need for an in-person physical exam.  This service lets us provide the care you need with a video conversation.  If a prescription is necessary we can send it directly to your pharmacy.  If lab work is needed we can place an order for that and you can then stop by our lab to have the test done at a later time.    Video visits are billed at different rates depending on your insurance coverage. " "Please reach out to your insurance provider with any questions.    If during the course of the call the physician/provider feels a video visit is not appropriate, you will not be charged for this service.\"    Patient has given verbal consent to a Video visit? Yes    Will anyone else be joining your video visit? No          Moise Dee MD         [facility] Hematology and Oncology Progress Note    Patient: Willa Light  MRN: [id number]  Date of Service: December 17, 2020      Assessment and Plan:    Breast cancer    Primary site: Breast (Left)    Staging method: AJCC 7th Edition    Clinical: Stage IA (T1c, N0, cM0) - Signed by Moise Dee MD on 8/13/2014    Pathologic free text: [id number]    Summary: Stage IA (T1c, N0, cM0)    1. Breast cancer: Seems to be doing okay clinically.  Does not offer any complaints consistent with recurrent disease.  She is about 2-1/2 years out from completion of her therapy so her recurrence risk at this point is relatively low.  We will see her back again in 6 months.  I asked her to let us know in the interim if she develops any new symptoms such as headaches, cough, shortness of breath, or new bony pain.      2.  Chemotherapy-induced peripheral neuropathy: Stable.  Grade 1.    ECOG Performance   ECOG Performance Status: 0    Distress Assessment  Distress Assessment Score: No distress    Pain  Currently in Pain: No/denies    Diagnosis:    1. Invasive ductal carcinoma of the left breast:  Triple negative. Stage [id number]. Diagnosed July 2014. She had a positive margin at initial surgery. BRCA-.    2.  Invasive ductal carcinoma of the right breast: Triple negative.  Grade 3.  Clinical stage T2N1M0.  Diagnosed July 2017.  MRI shows 3 foci and probable intervening tumor between these 3 foci.  Total size of abnormality in the MRI was 4.2 cm.  One suspicious-appearing node in the axilla.    3.  Pneumonitis: CT on October 11 shows diffuse bilateral mixed airspace and " interstitial infiltrates.  Possibly due to chemotherapy.  Bronchoscopy was done on October 20.  Cultures negative.  She seemed to respond to steroids.    Treatment:    She had a lumpectomy followed by 4 cycles of Taxotere and Cytoxan. This was completed in October 2014. She then had a reresection for the positive margin. She then had adjuvant radiation completed in February 2015.     Recurrence:  She was treated with neoadjuvant Adriamycin and Cytoxan for her second, most recent diagnosis.  Chemotherapy started on August 16, 2017.  Cycle 4 given on September 27.  Weekly Taxol started October 11.  Taxol reduced 25% for neuropathy.  Last was completed on January 17, 2018    Right-sided lumpectomy performed in February 12, 2018.  Pathology showed complete response.  No residual disease.  Adjuvant radiation completed on May 11, 2018    Interim History:    Willa is contacted for a phone visit.  She appears to be doing okay.  No acute complaints today.  Energy and appetite are good.  No shortness of breath or cough.  Numbness in the hands is minimal.    Review of Systems:    Constitutional  Constitutional (WDL): All constitutional elements are within defined limits  Neurosensory  Neurosensory (WDL): Exceptions to WDL  Peripheral Motor Neuropathy: Concerns(occ)  Eye   Eye Disorder (WDL): All eye disorder elements are within defined limits  Ear  Ear Disorder (WDL): Exceptions to WDL  Tinnitus: Concerns(bilateral)  Cardiovascular  Cardiovascular (WDL): All cardiovascular elements are within defined limits  Pulmonary  Respiratory (WDL): Exceptions to WDL  Cough: Concerns(acid reflux and allergy related)  Gastrointestinal  Gastrointestinal (WDL): All gastrointestinal elements are within defined limits  Genitourinary  Genitourinary (WDL): All genitourinary elements are within defined limits  Lymphatic  Lymph (WDL): All lymph disorder elements are within defined limits  Musculoskeletal and Connective Tissue  Musculoskeletal  and Connetive Tissue Disorders (WDL): Exceptions to WDL  Arthralgia: Concerns  Integumentary  Integumentary (WDL): All integumentary elements are within defined limits  Patient Coping  Patient Coping: Accepting;Open/discussion  Accompanied by  Accompanied by: Alone  Oral Chemo Adherence         Constitutional     Neurosensory     Cardiovascular     Pulmonary     Gastrointestinal     Genitourinary     Integumentary     Patient Coping  Patient Coping: Accepting;Open/discussion  Accompanied by  Accompanied by: Alone    Past History:    Past Medical History:   Diagnosis Date     Asthma      Breast cancer (H) 2014    left     Breast cancer (H) 2017    right     Bronchitis, chronic (H)      Colon polyp 2009    one precancerous polyp     Cytoxan-induced pulmonary interstitial disease (H)      Disease of thyroid gland      GERD (gastroesophageal reflux disease)      Hx antineoplastic chemotherapy 2014     Hx of radiation therapy 2014     Peripheral neuropathy     hands and feet     Pneumonia      Sinusitis      Uterine polyp      Physical Exam:    Recent Vitals 10/2/2019   Height -   Weight 168 lbs 11 oz   BSA (m2) 1.81 m2   /89   Pulse 90   Temp 98.4   Temp src 1   SpO2 95   Some recent data might be hidden     Lab Results:    No results found for this or any previous visit (from the past 168 hour(s)).     Imaging:    No results found.      Signed by: Moise Dee MD

## 2021-06-13 NOTE — PROGRESS NOTES
Pt here for first weekly taxol.  Pt has been having fevers and chills.  Pt saw NP and blood cultures from port was drawn.  Pt also is scheduled for a chest CT after taxol today.  Pt says she's allergic to decadron so none given.  No problems with infusion and port flushed and CT capable needle inserted and pt d/c using wheel chair  to CT with her

## 2021-06-13 NOTE — PROGRESS NOTES
Patient arrived ambulatory, by self, for port lab draw, for additional lab tests.  Port accessed under aspetic technique - excellent blood return noted when flushed with several syringes of NS used.  Blood drawn for labs.  Port flushed with NS.  Heparin instilled and needle dced.  Dressing applied.  Patient left ambulatory, by self, in stable condition.  Instructed to follow up with triage next week or to call with questions/  Concerns/problems.  Patient verbalized understanding.

## 2021-06-13 NOTE — TELEPHONE ENCOUNTER
Called pt to inform her that per Dr. Dee her lab work is stable. F/u as scheduled. Pt verbalized understanding of this.

## 2021-06-13 NOTE — PROGRESS NOTES
[facility] Hematology and Oncology Progress Note    Patient: Willa Light  MRN: [id number]  Date of Service:  September 27, 2017      Assessment and Plan:    Breast cancer    Primary site: Breast (Left)    Staging method: AJCC 7th Edition    Clinical: Stage IA (T1c, N0, cM0) - Signed by Moise Dee MD on 8/13/2014    Pathologic free text: [id number]-    Summary: Stage IA (T1c, N0, cM0)    1. Breast cancer: Today is cycle 4 of Adriamycin and Cytoxan.  She has had progressive problems with fatigue and acid reflux after each cycle.  She also notes that she was having low-grade fevers after each cycle as well.  She would like to proceed today to finish her Adriamycin and Cytoxan.  She has not had any documented febrile neutropenic episodes.  He will continue to receive Neulasta.  Her nausea is minimal.    2.  Cough: She thinks it is exacerbated by reflux.  Worse when she is lying down.  Worse during the first week after treatment.  She will continue the Protonix 20 daily.  I am adding ranitidine 300 mg nightly to try to improve this and allow her better sleep.  She denies any shortness of breath or lower extremity edema.    3.  Anemia: Chemotherapy-induced.  Seemingly asymptomatic at this point.  We will continue to monitor.    ECOG Performance   ECOG Performance Status: 1    Distress Assessment  Distress Assessment Score: 7 (very emotional today)    Pain  Currently in Pain: No/denies    Diagnosis:    1. Invasive ductal carcinoma of the left breast:  Triple negative. Stage O3mG9An. Diagnosed July 2014. She had a positive margin at initial surgery. BRCA -.    2.  Invasive ductal carcinoma of the right breast: Triple negative.  Grade 3.  Clinical stage T2N1M0.  Diagnosed July 2017.  MRI shows 3 foci and probable intervening tumor between these 3 foci.  Total size of abnormality in the MRI was 4.2 cm.  One suspicious-appearing node in the axilla.    Treatment:    She had a lumpectomy followed by 4 cycles of  Taxotere and Cytoxan. This was completed in October 2014. She then had a reresection for the positive margin. She then had adjuvant radiation completed in February 2015.     She is now being treated with neoadjuvant Adriamycin and Cytoxan for her second, most recent diagnosis.    Interim History:    Willa is here for follow-up visit.  She is here for cycle 4 of Adriamycin and Cytoxan.  In general she has had more cumulative problems with heartburn and fatigue after each dose.  She denies any shortness of breath orthopnea or lower extremity edema.  Heartburn and cough gets worse when she reclines.  She has been taking Protonix and Gaviscon for that.  She also has some mild postnasal drip.  No mucositis or skin rash.  No significant nausea or diarrhea.  She has decreased taste.    Review of Systems:    Constitutional  Constitutional (WDL): Exceptions to WDL  Fatigue: Fatigue not relieved by rest - Limiting instrumental ADL  Fever: 38.0 - 39.0 degrees C (100.4 - 102.2 degrees F) (post treatment she thinks due to no steroids)  Neurosensory  Neurosensory (WDL): Exceptions to WDL  Ataxia: Asymptomatic, clinical or diagnostic observations only, intervention not indicated (unsteady on feet due to weakness)  Cardiovascular  Cardiovascular (WDL): All cardiovascular elements are within defined limits  Pulmonary  Respiratory (WDL): Exceptions to WDL  Cough: Moderate symptoms, medical intervention indicated, limiting instrumental ADL  Gastrointestinal  Gastrointestinal (WDL): Exceptions to WDL (acid reflux)  Anorexia: Loss of appetite without alteration in eating habits  Diarrhea: Increase of <4 stools per day over baseline, mild increase in ostomy output compared to baseline (nerves)  Dysphagia: Symptomatic, able to eat regular diet  Nausea: Loss of appetite without alteration in eating habits  Dysgeusia: Altered taste but no change in diet  Genitourinary  Genitourinary (WDL): All genitourinary elements are within defined  limits  Integumentary  Integumentary (WDL): Exceptions to WDL  Alopecia: Hair loss of >50% normal for that individual that is readily apparent to others, a wig or hair piece is necessary if the patient desires to completely camouflage the hair loss, associated with psychosocial impact  Patient Coping  Patient Coping: Anxiety;Sadness  Accompanied by  Accompanied by: Alone    Past History:    Past Medical History:   Diagnosis Date     Asthma      Breast cancer 2014    left     Breast cancer 2017    right     Bronchitis, chronic      Colon polyp      Disease of thyroid gland      GERD (gastroesophageal reflux disease)      Pneumonia      S/P lumpectomy, left breast 7/16/14     Sinusitis      Uterine polyp      Physical Exam:    Recent Vitals 9/27/2017   Height -   Weight 157 lbs 8 oz   BSA (m2) -   /80   Pulse 97   Temp 98.2   Temp src 1   SpO2 99   Some recent data might be hidden     General: patient appears stated age of 62 y.o.. Nontoxic and in no distress.   HEENT: Head: atraumatic, normocephalic. Sclerae anicteric.   Chest:  Normal respiratory effort.  Clear to auscultation bilaterally.  No wheezing or crackles.  Cardiac:  No edema.  Regular rate and rhythm.  No murmur.  No extra heart sounds appreciated.  Abdomen: abdomen is soft, non-distended  Extremities: normal tone and muscle bulk.  Skin: no lesions or rash. Warm and dry.   CNS: alert and oriented x3. Grossly non-focal.   Psychiatric: normal mood and affect.     Lab Results:    Recent Results (from the past 168 hour(s))   Comprehensive Metabolic Panel   Result Value Ref Range    Sodium 143 136 - 145 mmol/L    Potassium 3.6 3.5 - 5.0 mmol/L    Chloride 107 98 - 107 mmol/L    CO2 27 22 - 31 mmol/L    Anion Gap, Calculation 9 5 - 18 mmol/L    Glucose 135 (H) 70 - 125 mg/dL    BUN 10 8 - 22 mg/dL    Creatinine 0.74 0.60 - 1.10 mg/dL    GFR MDRD Af Amer >60 >60 mL/min/1.73m2    GFR MDRD Non Af Amer >60 >60 mL/min/1.73m2    Bilirubin, Total 0.2 0.0 - 1.0  mg/dL    Calcium 9.0 8.5 - 10.5 mg/dL    Protein, Total 6.9 6.0 - 8.0 g/dL    Albumin 3.0 (L) 3.5 - 5.0 g/dL    Alkaline Phosphatase 157 (H) 45 - 120 U/L    AST 21 0 - 40 U/L    ALT 7 0 - 45 U/L   HM1 (CBC with Diff)   Result Value Ref Range    WBC 15.5 (H) 4.0 - 11.0 thou/uL    RBC 3.53 (L) 3.80 - 5.40 mill/uL    Hemoglobin 9.9 (L) 12.0 - 16.0 g/dL    Hematocrit 30.6 (L) 35.0 - 47.0 %    MCV 87 80 - 100 fL    MCH 28.0 27.0 - 34.0 pg    MCHC 32.4 32.0 - 36.0 g/dL    RDW 18.0 (H) 11.0 - 14.5 %    Platelets 236 140 - 440 thou/uL    MPV 9.8 8.5 - 12.5 fL     Imaging:    No results found.      Signed by: Moise Dee MD

## 2021-06-13 NOTE — PROGRESS NOTES
[facility] Hematology and Oncology Progress Note    Patient: Willa Light  MRN: [id number]  Date of Service: 9/14/2017         Reason for Visit:    D1C3 ddAC+T followed by Ray    Assessment and Plan:    Breast cancer    Staging form: Breast, AJCC 7th Edition    - Clinical: Stage IA (T1c, N0, cM0) - Signed by Moise Dee MD on 8/13/2014    - Pathologic: [id number]- - Signed by Zaira Luna CNP on 10/15/2014    Malignant neoplasm of right female breast    Staging form: Breast, AJCC 7th Edition    - Clinical: Stage IIB (T2(3), N1, M0) - Signed by Zaira Luna CNP on 8/30/2017          Prognostic indicators: Triple Negative      62 y.o.     2014, July - invasive ductal carcinoma of the (L) breast.    Triple negative.     BRCA -     [id number].     Lumpectomy followed by 4 cycles of Taxotere and Cytoxan.     Positive margin at initial surgery prompted re-resection.    Breast conservation EBRT completed in February, 2015.     2017, July - invasive ductal carcinoma of the (R) breast.    Triple negative.      Grade 3.      Clinical stage T2N1M0.     08/16/17 - D1C1 neoadjuvant ddAC+T chemotherapy.    09/14/17:    CBC - 14.7.  ANC 11.8.  HgB dropping @ 11.4.  Plts stable @ 183,000.    CMP - mildly elevated alk phos - has been regularly taking Tylenol in evening for temp 99.8-100.0 for the past 4-5 days.  Also, Neulasta can cause mildly elevated alk phos.  Will monitor.  Mild hypoalbuminemia - encouraged more dietary protein.    Patient looking and feeling quite good other than GERD.    Proceed D1C3 ddAC+T chemotherapy.    09/27/17 - follow up D1C4 ddAC+T.    2) GERD:     Chronic, but exacerbated with chemotherapy.    Tried two different PPI' s - not effective.    Incorporating dietary changes - small, frequent meals with snacking.  [place]ana[place] products, peppermint products.  [place] foods.     Taking Gaviscon chewables.    CXR @ PCP clinic - OK    Will try Protonix 20 mg daily - Rx escribed.   "Potential side effects reviewed.  Instructed to discontinue Pepcid.  Discussed with patient's PCP, Dr Collier.    ECOG Performance:     ECOG Performance Status: 1     Distress Assessment:    Distress Assessment Score: 6 (cough, acid reflex and father's death 3 yrs ago)    Pain:    Currently in Pain: No/denies        TT > 25 minutes face to face with > 50% counseling and care coordination.    Edmundo MONGE Radha, CNP  ______________________________________________________________________________    ____________________________________________________________________     Interim History:    The patient presents anticipating ongoing chemotherapy.  She states she would be doing well with her chemotherapy if not for her GERD symptroms.  She states she has no \"heart burn\", just reflux.  This was bothered with this prior to chemotherapy but the symptoms have exacerbated since starting chemotherapy.  She states it also affects her ability to have lengthy conversations as it often causes a cough.  She has been on several PPI's but nothing seems to work.  She states she has also adjusted her diet.  Additionally she takes an antihistamine.  She recently had a low grade temp treated with antibiotic.  Portable CXR at her PCP clinic was unremarkable.  She denies fever, chills, unusual headaches, visual or mentation disturbances, bowel or bladder issues.  Her appetite, weight and performance status are quite stable.     Pain Status:    Currently in Pain: No/denies    Review of Systems:    Constitutional  Constitutional (WDL): Exceptions to WDL  Fatigue: Fatigue relieved by rest  Fever: 38.0 - 39.0 degrees C (100.4 - 102.2 degrees F) (last 2 day today 99.2 up to 100)  Neurosensory  Neurosensory (WDL): All neurosensory elements are within defined limits  Eye   Eye Disorder (WDL): Exceptions to WDL (glasses)  Watering Eyes: Intervention not indicated  Ear  Ear Disorder (WDL): Exceptions to WDL  Tinnitus: Mild symptoms, intervention not " "indicated (\"birds\")  Cardiovascular  Cardiovascular (WDL): Exceptions to WDL  Pulmonary  Respiratory (WDL): Exceptions to WDL  Cough: Moderate symptoms, medical intervention indicated, limiting instrumental ADL (sinus drainage/ GERD)  Gastrointestinal  Gastrointestinal (WDL): Exceptions to WDL (GERD)  Anorexia: Loss of appetite without alteration in eating habits  Diarrhea: Increase of <4 stools per day over baseline, mild increase in ostomy output compared to baseline (this morning)  Nausea: Loss of appetite without alteration in eating habits  Dysgeusia: Altered taste but no change in diet  Genitourinary  Genitourinary (WDL): All genitourinary elements are within defined limits  Lymphatic  Lymph (WDL): All lymph disorder elements are within defined limits  Musculoskeletal and Connective Tissue  Musculoskeletal and Connetive Tissue Disorders (WDL): All Musculoskeletal and Connetive Tissue Disorder elements are within defined limits  Integumentary  Integumentary (WDL): Exceptions to WDL  Alopecia: Hair loss of >50% normal for that individual that is readily apparent to others, a wig or hair piece is necessary if the patient desires to completely camouflage the hair loss, associated with psychosocial impact  Patient Coping  Patient Coping: Sadness;Anxiety (father's death 3 years ago)  Distress Assessment  Distress Assessment Score: 6 (cough, acid reflex and father's death 3 yrs ago)  Accompanied by  Accompanied by: Alone  Oral Chemo Adherence       Past Histories:    Past Medical History:   Diagnosis Date     Asthma      Breast cancer 2014    left     Breast cancer 2017    right     Bronchitis, chronic      Colon polyp      Disease of thyroid gland      GERD (gastroesophageal reflux disease)      Pneumonia      S/P lumpectomy, left breast 7/16/14     Sinusitis      Uterine polyp        PHYSICAL EXAM:    /80  Pulse (!) 103  Temp 99.2  F (37.3  C) (Oral)   Wt 158 lb 6.4 oz (71.8 kg)  SpO2 98%  BMI 29.93 " kg/m2    GENERAL:   No acute distress. Cooperative in conversation. Here alone  HEENT:   YISSEL.  EOMI.  Anicteric sclera.  Oromucosa is clean and intact. No ulcerations, mucositis or bleeding noted.  RESP:    Lungs are clear bilaterally. No wheezes or rhonchi.  CV:    Regular rate and rhythm. No murmur heard.  ABD:    Soft, nontender. Positive bowel sounds. No organomegaly.   EXTREMITIES:  No lower extremity swelling.   NEURO:   Non focal. Alert and oriented x3.   PSYCH:   No apparent depression or anxiety.  SKIN:    Warm and dry.  port is CDI in left chest wall.   LYMPH:   No cervical, supraclavicular or axillary lymphadenopathy noted.    Lab Results    Reviewed with patient    Recent Results (from the past 24 hour(s))   Comprehensive Metabolic Panel   Result Value Ref Range    Sodium 139 136 - 145 mmol/L    Potassium 4.2 3.5 - 5.0 mmol/L    Chloride 104 98 - 107 mmol/L    CO2 28 22 - 31 mmol/L    Anion Gap, Calculation 7 5 - 18 mmol/L    Glucose 95 70 - 125 mg/dL    BUN 12 8 - 22 mg/dL    Creatinine 0.74 0.60 - 1.10 mg/dL    GFR MDRD Af Amer >60 >60 mL/min/1.73m2    GFR MDRD Non Af Amer >60 >60 mL/min/1.73m2    Bilirubin, Total 0.2 0.0 - 1.0 mg/dL    Calcium 8.9 8.5 - 10.5 mg/dL    Protein, Total 7.4 6.0 - 8.0 g/dL    Albumin 3.4 (L) 3.5 - 5.0 g/dL    Alkaline Phosphatase 161 (H) 45 - 120 U/L    AST 26 0 - 40 U/L    ALT 14 0 - 45 U/L   HM1 (CBC with Diff)   Result Value Ref Range    WBC 14.7 (H) 4.0 - 11.0 thou/uL    RBC 4.06 3.80 - 5.40 mill/uL    Hemoglobin 11.4 (L) 12.0 - 16.0 g/dL    Hematocrit 34.6 (L) 35.0 - 47.0 %    MCV 85 80 - 100 fL    MCH 28.1 27.0 - 34.0 pg    MCHC 32.9 32.0 - 36.0 g/dL    RDW 16.3 (H) 11.0 - 14.5 %    Platelets 183 140 - 440 thou/uL    MPV 9.6 8.5 - 12.5 fL   Manual Differential   Result Value Ref Range    Total Neutrophils % 81 (H) 50 - 70 %    Lymphocytes % 5 (L) 20 - 40 %    Monocytes % 9 2 - 10 %    Eosinophils %  1 0 - 6 %    Basophils % 0 0 - 2 %    Metamyelocytes % 3 (H) <=1 %     Myelocytes % 0 <=1 %    Total Neutrophils Absolute 11.8 (H) 2.0 - 7.7 thou/ul    Lymphocytes Absolute 0.7 (L) 0.8 - 4.4 thou/uL    Monocytes Absolute 1.3 (H) 0.0 - 0.9 thou/uL    Eosinophils Absolute 0.1 0.0 - 0.4 thou/uL    Basophils Absolute 0.1 0.0 - 0.2 thou/uL    Metamyelocytes Absolute 0.5 (H) <=0.1 thou/uL    Myelocytes Absolute 0.1 <=0.1 thou/uL    Platelet Estimate Normal Normal    Ovalocytes 1+ (!) Negative         Imaging:    No results found.

## 2021-06-13 NOTE — PROGRESS NOTES
Pulmonary Clinic Outpatient Consultation    Assessment and Plan:   Willa Light is a 62 y.o. female with a history of invasive ductal carcinoma of left breast in 2014, s/p chemo and radiation with lumpectomy, recent dx of invasive ductal carcinoma of right breast stage II s/p 4 cycles of adriamycin and cytoxan with taxol, directly adm by pulmonary for bronchoscopy due to 2 month history of nonproductive cough and fevers. She was started on prednisone last weekend and has improved. Reviewed her bronchoscopy and imaging studies. The pattern of lung injury is very typical of cyclophosphamide and should improve with steroids. She will also need to be monitored closely for any recurrence of pulmonary toxicity when she resumes Taxol as this agent has also been associated with interstitial pneumonitis, although she did tolerate in the past when she had her first episode of breast cancer.    Recommendations:  -Cont the prednisone, oncology team is managing the steroid therapy  -Added Bactrim tiw to be taken while on high dose prednisone (she can stop it once she is down to 20mg)  -Recommended calcium and vitamin D supplementation while she is on the prednisone  -follow up in 8 weeks with repeat CT chest to assess response    I appreciate the opportunity to participate in the care of Ms. Light.  Please feel free to contact me at any time.    CCx: pulmonary infiltrates. 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 CT showed bilateral infiltrates. She had an unremarkable bronchoscopy. Seen by LAINA Cutler/ 10/20. On 10/22 called reporting fevers were back after finishing  Augmentin. She was started on prednisone 60mg daily for 2 weeks with plan for taper. She presents today for follow up.    She feels good since starting prednisone. Breathing is much improved, coughing less (still coughing but non-productive), fevers have stopped. Has a lot of energy and able to climb stairs and keep active without any breathing limitations.    She will resume Taxol this week for 5 more cycles and will follow up with Dr. Dee.    ROS:  A 12-system review was obtained and was negative with the exception of the symptoms endorsed in the history of present illness.    PMH:      Past Medical History:   Diagnosis Date     Asthma      Breast cancer 2014    left     Breast cancer 2017    right     Bronchitis, chronic      Colon polyp      Disease of thyroid gland      GERD (gastroesophageal reflux disease)      Pneumonia      S/P lumpectomy, left breast 7/16/14     Sinusitis      Uterine polyp        PSH:  Past Surgical History:   Procedure Laterality Date     BREAST LUMPECTOMY Left 07/2014    keft     FOOT ARTHRODESIS, MODIFIED CHAVEZ  may 2013 and oct 2013    hard to get hardware removed due to allergy to metal      FOOT SURGERY Right 2013     POLYPECTOMY  2009    Uterine polyp removed        Allergies:  Allergies   Allergen Reactions     Codeine Nausea Only     Cortisone (Hydrocortisone) [Hydrocortisone] Hives     Injection     Other Environmental Allergy      Seasonal Allergies, Mold      Pulmicort [Budesonide] Hives     Nickel Rash     Triamcinolone Rash     Zantac [Ranitidine Hcl] Nausea And Vomiting     Zofran (As Hydrochloride) [Ondansetron Hcl] Nausea And Vomiting       Family HX:  Family History   Problem Relation Age of Onset     Ovarian cancer Maternal Grandmother 60     biopsy showed adenocarcinoma, suspected ovarian primary     Colon cancer Maternal Grandfather 65     Breast cancer Cousin 50     triple-negative     Thyroid cancer Cousin 57     Hypertension Mother      Dementia Mother       Diabetes Father      Hypertension Father      Pneumonia Father      Hypertension Sister      Breast cancer Sister 69     Cancer Sister 69     Endometrial     Diabetes Brother      Hypertension Brother      Hypertension Paternal Grandmother      Stroke Paternal Grandmother      Stomach cancer Paternal Grandfather 70     Hypertension Brother      Diabetes Brother      Hypertension Sister      Colon cancer Maternal Aunt 75     Cancer Maternal Uncle      not otherwise specified     Diabetes Brother      Hypertension Son      Asthma Son        Social Hx:  Social History     Social History     Marital status:      Spouse name: N/A     Number of children: N/A     Years of education: N/A     Occupational History     Not on file.     Social History Main Topics     Smoking status: Never Smoker     Smokeless tobacco: Never Used     Alcohol use No     Drug use: No     Sexual activity: No     Other Topics Concern     Not on file     Social History Narrative       Current Meds:  Current Outpatient Prescriptions   Medication Sig Dispense Refill     ALBUTEROL SULFATE INHL Inhale 2 puffs every 4 (four) hours as needed.        cetirizine (ZYRTEC) 10 MG tablet Take 10 mg by mouth every evening.        fexofenadine (ALLEGRA) 60 MG tablet Take 60 mg by mouth daily as needed.        lidocaine-prilocaine (EMLA) cream Apply to port site 1 hour before port needle access. 15 g 1     montelukast (SINGULAIR) 10 mg tablet Take 10 mg by mouth bedtime.       multivitamin therapeutic (THERAGRAN) tablet Take 1 tablet by mouth every evening.        pantoprazole (PROTONIX) 20 MG tablet Take 20 mg by mouth daily before breakfast.       predniSONE (DELTASONE) 10 mg tablet Take 6 tablets (60 mg total) by mouth daily. 200 tablet 0     acetaminophen (TYLENOL) 500 MG tablet Take 1,000 mg by mouth 4 (four) times a day as needed for fever.       [START ON 10/27/2017] sulfamethoxazole-trimethoprim (SEPTRA DS) 800-160 mg per tablet Take 1 tablet by  mouth 3 (three) times a week for 10 days. 30 tablet 2     No current facility-administered medications for this visit.        Physical Exam:  /58  Pulse 96  Resp 16  Wt 155 lb 1.6 oz (70.4 kg)  SpO2 96% Comment: RA  BMI 29.31 kg/m2  Gen: awake alert, oriented, no distress  HEENT: nasal turbinates are unremarkable, no oropharyngeal lesions, no cervical or supraclavicular lymphadenopathy  CV: RRR, no M/G/R  Resp: CTAB, no focal crackles or wheezes  Abd: soft, nontender, no palpable organomegaly  Skin: no apparent rashes  Ext: no cyanosis, clubbing or edema  Neuro: alert, nonfocal    Labs:  Procal negative  Wbc wnl   Bronch BAL studies negative  asprgillus negative  TB, PJP eval negative  quantiferon negative  Histo, blasto, cocci testing negative  BAL viral cx pending  BAL cytology negative for malignant cells, PJP or AFB stains negative    Imaging studies:  CT chest:  IMPRESSION:   CONCLUSION:  1.  Diffuse bilateral findings mixed airspace and interstitial infiltrates. Differential diagnosis would include infectious or inflammatory infiltrate versus pulmonary edema.    PFT's not available    Gera Swain MD  [facility] Pulmonary & Critical Care  Pager [phone]  Clinic [phone]

## 2021-06-13 NOTE — PROGRESS NOTES
[facility]ology and On[facility] Progress Note    Patient: Willa Light  MRN: [id number]  Date of Service:         Reason for Visit    Chief Complaint   Patient presents with     HE Cancer       Assessment and Plan  Breast cancer    Staging form: Breast, AJCC 7th Edition    - Clinical: Stage IA (T1c, N0, cM0) - Signed by Moise Dee MD on 8/13/2014    - Pathologic: [id number]-[id number]- - Signed by Zaira Luna CNP on 10/15/2014    Malignant neoplasm of right female breast    Staging form: Breast, AJCC 7th Edition    - Clinical: Stage IIB (T2(3), N1, M0) - Signed by Zaira Luna CNP on 8/30/2017          Prognostic indicators: Triple Negative    1. Breast cancer: hx of breast cancer of left breast in 2014, now with right sided breast cancer, clinically stage 2. She has started neoadjuvant chemo with dose dense AC. She has completed AC and will start Taxol today. I did offer for her to start next week, but she wanted to start today. She will return weekly for 12 weeks.     2. Elevated alk phos: likely from chemo. Mild. Continue to monitor with chemo.     3. Heartburn/nausea: chemo induced. zofran made things worse. Do small, frequent meals with snacking. Janae products, peppermint products. Alexander foods. Take maalox, berta which seem to help temporarily. This will hopefully improve now on taxol.     4. Cough/fevers: I will get Blood culture and Chest CT. These symptoms have been persisting for a long time so it is a little worrisome.     5. Leukocytosis: could be from infection or Neulasta that she got 2 weeks ago. Continue to monitor and eval for infection    6. Potential exposure to TB: her cough and fever had been going on before this exposure. Her QTF mycobacerium was indeterminate so we will repeat in one month. Her chest xray shows some possible interstitial edema and/or inflammation. Check Chest CT.     ECOG Performance   ECOG Performance Status: 1     Distress Assessment  Distress  Assessment Score: 7: refer to Ita Vazquez today to assess and treat.     Pain  Currently in Pain: No/denies      Problem List    1. Anxiety  Ambulatory referral to Oncology Psychotherapist   2. Malignant neoplasm of upper-outer quadrant of right female breast  CC OFFICE VISIT LONG    DISCONTINUED: sodium chloride 0.9% 250 mL infusion    DISCONTINUED: diphenhydrAMINE injection 25 mg (BENADRYL)    DISCONTINUED: famotidine 20 mg/2 mL injection 20 mg (PEPCID)    DISCONTINUED: PACLitaxel 139 mg in dextrose 5% (non-PVC) 250 mL (TAXOL)    DISCONTINUED: sodium chloride 0.9 % flush 20 mL (NS)    DISCONTINUED: heparin 100 unit/mL lockflush (PF) porcine 300-600 Units    DISCONTINUED: diphenhydrAMINE injection 50 mg (BENADRYL)    DISCONTINUED: famotidine 20 mg/2 mL injection 20 mg (PEPCID)    DISCONTINUED: hydrocortisone sod succ (PF) 100 mg/2 mL injection 100 mg    DISCONTINUED: acetaminophen tablet 1,000 mg (TYLENOL)   3. Malignant neoplasm of upper-outer quadrant of right breast in female, estrogen receptor negative  Infusion Appointment    Infusion Appointment   4. Cough  CT Chest With Contrast    Blood Culture   5. Fever  CT Chest With Contrast    Blood Culture   6. Effects of exposure to external cause, subsequent encounter  QTF-Mycobacterium tuberculosis by QuantiFERON-TB Gold     ______________________________________________________________________________    History of Present Illness    Diagnosis:     1. Invasive ductal carcinoma of the left breast:  Triple negative. Stage [id number]. Diagnosed July 2014. She had a positive margin at initial surgery. BRCA - .   2.  Invasive ductal carcinoma of the right breast: Triple negative.  Grade 3.  Clinical stage T2N1M0. Diagnosed July 2017.     Current Treatment:     Has started Dose Dense AC. Has finished 4 cycles and is here to start Taxol.      Past Treatment:     She had a lumpectomy followed by 4 cycles of Taxotere and Cytoxan. This was completed in October 2014. She then  had a reresection for the positive margin. She then had adjuvant radiation completed in February 2015.      Interim History:  Pt is here today to continue on chemo. She continues to have issues with ongoing fevers and coughing. The coughing has been going on since July and is better since then, but continues to be an issue. She states it is likely her GERD, but medications don't seem to help. She is avoiding offending foods. She has also complaints of ongoing fevers. She is using tylenol 1-3 times per day as necessary for her chills and fevers. This has been happening since starting her chemo 2 months ago. She states it happened the last time she got chemo as well, but not as frequently. She did potentially get exposed to TB in our clinic so she did have a blood test that was indeterminate and a chest xray that did show some abnormalities. Pt is very more emotional today since she is not feeling well and states that she has been isolating herself since being on chemo and that is hard.       Pain Status  Currently in Pain: No/denies    Review of Systems    Constitutional  Constitutional (WDL): Exceptions to WDL  Fatigue: Fatigue not relieved by rest - Limiting instrumental ADL  Fever: 38.0 - 39.0 degrees C (100.4 - 102.2 degrees F)  Neurosensory  Neurosensory (WDL): Exceptions to WDL  Ataxia: Asymptomatic, clinical or diagnostic observations only, intervention not indicated  Eye   Eye Disorder (WDL): Exceptions to WDL  Blurred Vision: Intervention not indicated  Watering Eyes: Intervention not indicated  Ear  Ear Disorder (WDL): Exceptions to WDL  Tinnitus: Mild symptoms, intervention not indicated  Cardiovascular     Pulmonary  Respiratory (WDL): Exceptions to WDL  Cough: Moderate symptoms, medical intervention indicated, limiting instrumental ADL  Gastrointestinal  Gastrointestinal (WDL): Exceptions to WDL  Anorexia: Oral intake altered without significant weight loss or malnutrition, oral nutritional supplements  indicated  Dysphagia: Symptomatic, able to eat regular diet  Nausea: Loss of appetite without alteration in eating habits  Vomitin - 2 episodes ( by 5 minutes) in 24 hrs  Dysgeusia: Altered taste but no change in diet  Genitourinary  Genitourinary (WDL): All genitourinary elements are within defined limits  Lymphatic  Lymph (WDL): All lymph disorder elements are within defined limits  Musculoskeletal and Connective Tissue  Musculoskeletal and Connetive Tissue Disorders (WDL): Exceptions to WDL  Arthralgia: Mild pain  Muscle Weakness : Symptomatic, evident on physical exam, limiting instrumental ADL  Myalgia: Mild pain  Integumentary  Integumentary (WDL): Exceptions to WDL  Alopecia: Hair loss of >50% normal for that individual that is readily apparent to others, a wig or hair piece is necessary if the patient desires to completely camouflage the hair loss, associated with psychosocial impact  Patient Coping  Patient Coping: Anxiety  Distress Assessment  Distress Assessment Score: 7  Accompanied by  Accompanied by: Family Member  Oral Chemo Adherence       Past History  Past Medical History:   Diagnosis Date     Asthma      Breast cancer     left     Breast cancer 2017    right     Bronchitis, chronic      Colon polyp      Disease of thyroid gland      GERD (gastroesophageal reflux disease)      Pneumonia      S/P lumpectomy, left breast 14     Sinusitis      Uterine polyp        PHYSICAL EXAM:  /74  Pulse (!) 109  Temp 99.5  F (37.5  C) (Oral)   Wt 155 lb 5 oz (70.4 kg)  SpO2 96%  BMI 29.35 kg/m2  GENERAL: no acute distress. Cooperative in conversation. Here with son.   HEENT: pupils are equal, round and reactive. Oromucosa is clean and intact. No ulcerations or mucositis noted. No bleeding noted.  RESP: lungs are clear bilaterally per auscultation. Regular respiratory rate. No wheezes or rhonchi. Coughing with inspiration  CV: Regular, rate and rhythm. No murmurs.  ABD: soft,  nontender. Positive bowel sounds. No organomegaly.   MUSCULOSKELETAL: No lower extremity swelling.   NEURO: non focal. Alert and oriented x3.   PSYCH: within normal limits. No depression or anxiety. Emotional and teary today  SKIN: warm dry intact, port is CDI in left chest wall.   LYMPH: no cervical, supraclavicular or axillary lymphadenopathy          Lab Results    Recent Results (from the past 168 hour(s))   Comprehensive Metabolic Panel   Result Value Ref Range    Sodium 139 136 - 145 mmol/L    Potassium 3.8 3.5 - 5.0 mmol/L    Chloride 104 98 - 107 mmol/L    CO2 25 22 - 31 mmol/L    Anion Gap, Calculation 10 5 - 18 mmol/L    Glucose 130 (H) 70 - 125 mg/dL    BUN 16 8 - 22 mg/dL    Creatinine 0.77 0.60 - 1.10 mg/dL    GFR MDRD Af Amer >60 >60 mL/min/1.73m2    GFR MDRD Non Af Amer >60 >60 mL/min/1.73m2    Bilirubin, Total 0.3 0.0 - 1.0 mg/dL    Calcium 9.2 8.5 - 10.5 mg/dL    Protein, Total 7.3 6.0 - 8.0 g/dL    Albumin 3.0 (L) 3.5 - 5.0 g/dL    Alkaline Phosphatase 166 (H) 45 - 120 U/L    AST 28 0 - 40 U/L    ALT 11 0 - 45 U/L   HM1 (CBC with Diff)   Result Value Ref Range    WBC 25.4 (H) 4.0 - 11.0 thou/uL    RBC 3.28 (L) 3.80 - 5.40 mill/uL    Hemoglobin 9.2 (L) 12.0 - 16.0 g/dL    Hematocrit 28.4 (L) 35.0 - 47.0 %    MCV 87 80 - 100 fL    MCH 28.0 27.0 - 34.0 pg    MCHC 32.4 32.0 - 36.0 g/dL    RDW 19.9 (H) 11.0 - 14.5 %    Platelets 325 140 - 440 thou/uL    MPV 9.7 8.5 - 12.5 fL   Manual Differential   Result Value Ref Range    Total Neutrophils % 83 (H) 50 - 70 %    Lymphocytes % 5 (L) 20 - 40 %    Monocytes % 10 2 - 10 %    Eosinophils %  1 0 - 6 %    Basophils % 0 0 - 2 %    Metamyelocytes % 1 <=1 %    Myelocytes % 2 (H) <=1 %    Total Neutrophils Absolute 21.0 (H) 2.0 - 7.7 thou/ul    Lymphocytes Absolute 1.3 0.8 - 4.4 thou/uL    Monocytes Absolute 2.4 (H) 0.0 - 0.9 thou/uL    Eosinophils Absolute 0.1 0.0 - 0.4 thou/uL    Basophils Absolute 0.0 0.0 - 0.2 thou/uL    Metamyelocytes Absolute 0.3 (H) <=0.1  thou/uL    Myelocytes Absolute 0.4 (H) <=0.1 thou/uL    Manual nRBC per 100 Cells 2 (H) 0-<1    Toxic Granulation 1+ (!) Negative    Dohle Bodies 2+ (!) Negative    Platelet Estimate Normal Normal    Polychromasia 1+ (!) Negative       Imaging    Xr Chest Pa And Lateral    Result Date: 10/11/2017  XR CHEST PA AND LATERAL 10/11/2017, 12:31 PM INDICATION: Contact with and (suspected) exposure to tuberculosis. COMPARISON: 11/19/2014 chest radiograph. FINDINGS: Fine linear opacities radiating from both doroteo near the periphery of the mid and lower lungs could represent normal physiologic bronchopulmonary lung markings or represent minimal interstitial edema and/or inflammation. Partial obscuration of the lower right and left heart border secondary to prominent cardiac fat pad. Shallow inspiration exaggerates heart size which is within normal limits. Port-A-Cath anterior left chest wall with left subclavian vein central venous catheter tip in the mid SVC. Chest otherwise negative.         Signed by: Zaira Luna, CNP

## 2021-06-13 NOTE — PROGRESS NOTES
"Pt ambulates to infusion center for injection.  Pt states that she slept \"really good\" last night due to ranitidine.  Pt voices no new concerns today.  Neulasta given SQ as ordered.  Pt left clinic stable to lobby accompanied by her .  Plan RTC as scheduled.  "

## 2021-06-13 NOTE — PROGRESS NOTES
Pt ambulates to infusion center for lab draw prior to MD visit.  IVAD accessed, labs drawn et sent.  Pt seen by Dr. Dee and orders approved.  Pt returns to infusion center for treatment.  Pre-meds, Adriamycin and Cytoxan infused as ordered without complication.  IVAD flushed w/NS et heparin and needle deaccessed.  Flu shot given per pt request.  Pt left clinic stable to [facility].  Plan RTC as scheduled.

## 2021-06-13 NOTE — PROGRESS NOTES
[facility] Hematology and Oncology Progress Note    Patient: Willa Light  MRN: [id number]  Date of Service:  November 1, 2017      Assessment and Plan:    Breast cancer    Primary site: Breast (Left)    Staging method: AJCC 7th Edition    Clinical: Stage IA (T1c, N0, cM0) - Signed by Moise Dee MD on 8/13/2014    Pathologic free text: [id number]    Summary: Stage IA (T1c, N0, cM0)    1. Breast cancer: We are going to start her back on weekly Taxol.  Today will be dose to 12.  Dose 1 was given 3 weeks ago.  She has been on hold due to the pneumonitis.  We will continue at 100% dosing.  Asked her to call us immediately if she has fever or suggestion of worsening shortness of breath.    We will touch base with the genetics again to see if she is a candidate for broader spectrum panel at this point.    2.  Pneumonitis: Bronchoscopy done.  Cultures negative.  She is responded to steroids.  None of the drugs she was on, we will give a pneumonitis.  Clinically she is improving.  We will continue with Taxol starting again today.  She will let us know if she has any new symptoms.  She is on a prolonged steroid taper.  60 mg now.  On November 8 will taper down 10 mg per week.  We will continue Bactrim until she is at 20 mg daily.    ECOG Performance   ECOG Performance Status: 1    Distress Assessment  Distress Assessment Score: 5 (shaking and balance with Septra)    Pain  Currently in Pain: No/denies    Diagnosis:    1. Invasive ductal carcinoma of the left breast:  Triple negative. Stage [id number]. Diagnosed July 2014. She had a positive margin at initial surgery. BRCA -.    2.  Invasive ductal carcinoma of the right breast: Triple negative.  Grade 3.  Clinical stage T2N1M0.  Diagnosed July 2017.  MRI shows 3 foci and probable intervening tumor between these 3 foci.  Total size of abnormality in the MRI was 4.2 cm.  One suspicious-appearing node in the axilla.    Treatment:    She had a lumpectomy followed by  4 cycles of Taxotere and Cytoxan. This was completed in October 2014. She then had a reresection for the positive margin. She then had adjuvant radiation completed in February 2015.     She is now being treated with neoadjuvant Adriamycin and Cytoxan for her second, most recent diagnosis.    Interim History:    Willa is here for follow-up visit.  She has not had chemotherapy in 3 weeks.  In the interim she is been treated with antibiotics and had a bronchoscopy done.  She is also been started on high-dose prednisone for chemotherapy-induced pneumonitis.  Clinically she is responding.  She is not having any heartburn or shortness of breath.  No more fevers.  She is having some side effects from the prednisone including tremor, some edema, increased appetite, and some sleep disturbance.  No pain or headaches.    Review of Systems:    Constitutional  Constitutional (WDL): Exceptions to WDL  Fatigue: Fatigue relieved by rest  Neurosensory  Neurosensory (WDL): Exceptions to WDL  Ataxia: Asymptomatic, clinical or diagnostic observations only, intervention not indicated (balance issues with Septra)  Cardiovascular  Cardiovascular (WDL): Exceptions to WDL  Edema: Yes  Edema Face: Localized facial edema (on prednisone)  Pulmonary  Respiratory (WDL): Exceptions to WDL  Cough: Mild symptoms, nonprescription intervention indicated (much better since figured out what was wrong with her)  Gastrointestinal  Gastrointestinal (WDL): Exceptions to WDL (all acid issues gone)  Dysgeusia: Altered taste but no change in diet  Genitourinary  Genitourinary (WDL): All genitourinary elements are within defined limits  Integumentary  Integumentary (WDL): Exceptions to WDL  Alopecia: Hair loss of >50% normal for that individual that is readily apparent to others, a wig or hair piece is necessary if the patient desires to completely camouflage the hair loss, associated with psychosocial impact  Patient Coping  Patient Coping:  Accepting  Accompanied by  Accompanied by: Alone    Past History:    Past Medical History:   Diagnosis Date     Asthma      Breast cancer 2014    left     Breast cancer 2017    right     Bronchitis, chronic      Colon polyp      Disease of thyroid gland      GERD (gastroesophageal reflux disease)      Pneumonia      S/P lumpectomy, left breast 7/16/14     Sinusitis      Uterine polyp      Physical Exam:    Recent Vitals 11/1/2017   Height -   Weight 154 lbs 14 oz   BSA (m2) -   /87   Pulse 96   Temp 97.8   Temp src 1   SpO2 96   Some recent data might be hidden     General: patient appears stated age of 62 y.o.. Nontoxic and in no distress.   HEENT: Head: atraumatic, normocephalic. Sclerae anicteric.   Chest:  Normal respiratory effort.  Clear to auscultation bilaterally.  No wheezing or crackles.  Cardiac:  No edema.  Regular rate and rhythm.  No murmur.   Abdomen: abdomen is soft, non-distended  Extremities: normal tone and muscle bulk.  Skin: no lesions or rash. Warm and dry.   CNS: alert and oriented x3. Grossly non-focal.   Psychiatric: normal mood and affect.     Lab Results:    Recent Results (from the past 168 hour(s))   HM1 (CBC with Diff)   Result Value Ref Range    WBC 16.4 (H) 4.0 - 11.0 thou/uL    RBC 3.69 (L) 3.80 - 5.40 mill/uL    Hemoglobin 10.4 (L) 12.0 - 16.0 g/dL    Hematocrit 33.9 (L) 35.0 - 47.0 %    MCV 92 80 - 100 fL    MCH 28.2 27.0 - 34.0 pg    MCHC 30.7 (L) 32.0 - 36.0 g/dL    RDW 22.4 (H) 11.0 - 14.5 %    Platelets 360 140 - 440 thou/uL    MPV 9.0 8.5 - 12.5 fL    Neutrophils % 94 (H) 50 - 70 %    Lymphocytes % 4 (L) 20 - 40 %    Monocytes % 2 2 - 10 %    Eosinophils % 0 0 - 6 %    Basophils % 0 0 - 2 %    Neutrophils Absolute 15.1 (H) 2.0 - 7.7 thou/uL    Lymphocytes Absolute 0.7 (L) 0.8 - 4.4 thou/uL    Monocytes Absolute 0.4 0.0 - 0.9 thou/uL    Eosinophils Absolute 0.0 0.0 - 0.4 thou/uL    Basophils Absolute 0.0 0.0 - 0.2 thou/uL   Manual Differential   Result Value Ref Range     Toxic Granulation 1+ (!) Negative    Dohle Bodies 1+ (!) Negative    Platelet Estimate Normal Normal    Polychromasia 1+ (!) Negative     Imaging:    Xr Chest Pa And Lateral    Result Date: 10/11/2017  XR CHEST PA AND LATERAL 10/11/2017, 12:31 PM INDICATION: Contact with and (suspected) exposure to tuberculosis. COMPARISON: 11/19/2014 chest radiograph. FINDINGS: Fine linear opacities radiating from both doroteo near the periphery of the mid and lower lungs could represent normal physiologic bronchopulmonary lung markings or represent minimal interstitial edema and/or inflammation. Partial obscuration of the lower right and left heart border secondary to prominent cardiac fat pad. Shallow inspiration exaggerates heart size which is within normal limits. Port-A-Cath anterior left chest wall with left subclavian vein central venous catheter tip in the mid SVC. Chest otherwise negative.     Ct Chest With Contrast    Result Date: 10/11/2017  CT CHEST W CONTRAST 10/11/2017 4:50 PM INDICATION: cough, fever TECHNIQUE: Routine chest. Dose reduction techniques were used. IV CONTRAST: Iohexol (Omni) 90mL COMPARISON: Chest radiograph dated 10/11/2017. FINDINGS: LUNGS AND PLEURA: There are mild diffuse bilateral mixed airspace interstitial opacities findings could be seen with pulmonary edema or bilateral pneumonitis. No pneumothorax or pleural effusion. No pulmonary nodule or mass. MEDIASTINUM: No mediastinal or hilar lymphadenopathy. No pericardial effusion. There is a Port-A-Cath catheter with tip in the superior vena cava near its junction with the right atrium. No coronary artery calcification. LIMITED UPPER ABDOMEN: Mild diffuse hepatic steatosis without focal hepatic abnormality. Visualized adrenal glands spleen and gallbladder are normal. MUSCULOSKELETAL: Thoracolumbar scoliosis. No acute fracture no bony metastases identified.     CONCLUSION: 1.  Diffuse bilateral findings mixed airspace and interstitial infiltrates.  Differential diagnosis would include infectious or inflammatory infiltrate versus pulmonary edema. NOTE: ABNORMAL REPORT THE DICTATION ABOVE DESCRIBES AN ABNORMALITY FOR WHICH FOLLOW-UP IS NEEDED.       Signed by: Moise Dee MD

## 2021-06-13 NOTE — PROGRESS NOTES
Patient arrived ambulatory, by self, for port lab draw.  States it is taking longer to recover from the treatments each time, so is dreading each one a little more.  Port accessed under aseptic technique - excellent blood return noted.  Blood drawn for labs.  Port flushed with NS and needle secured.

## 2021-06-14 NOTE — PROGRESS NOTES
Pt arrived ambulatory to clinic for Cycle # 7 Day # 8 of her chemotherapy regimen.  Port was accessed using aseptic technique without difficulties without blood return.  Port flushed easily and pt was repositioned several way, but port had no blood return.  Pt states she doesn't have time for TPA today, so sign and held orders were placed for next visit.  Starla kitty labs peripherally.  Labs stable ok for treatment.  Administered premedications and chemotherapy per MD order.  Pt tolerated infusion well, no s/s of infusion reaction.  Port was flushed with NS and Heparin then de-accessed with 2x2 and papertape.  Pt will come a little earlier next week for TPA.  Pt verbalized understanding of plan of care and return to clinic.

## 2021-06-14 NOTE — PROGRESS NOTES
[facility] Hematology and Oncology Progress Note    Patient: Willa Light  MRN: [id number]  Date of Service:         Reason for Visit    Chief Complaint   Patient presents with     HE Cancer       Assessment and Plan  Malignant neoplasm of right female breast    Staging form: Breast, AJCC 7th Edition    - Clinical: Stage IIB (T2(3), N1, M0) - Signed by Zaira Luna CNP on 8/30/2017          Prognostic indicators: Triple Negative    1. Breast cancer: hx of breast cancer of left breast in 2014, now with right sided breast cancer, clinically stage 2. She has started neoadjuvant chemo with dose dense AC. She has completed AC and in now on Taxol. Today is week 4. Continue full dose.     2. Elevated alk phos: likely from chemo. Mild. Continue to monitor with chemo.     3. Anemia: chemo induced and usually cumulative. Overall asymptomatic except fatigue. Continue to monitor.      4. Cough/fevers: much better with steroids. Is now tapering down by 10mg per week. She will go down to 30mg daily on Tuesday. Can stop bactrim when at 20mg daily.     5. Oral thrush: start nystatin swish and spit.     6. Hyperglycemia: likely from prednisone. I expect this to improve with lowering the dose. Continue to monitor.     ECOG Performance   ECOG Performance Status: 1     Distress Assessment  Distress Assessment Score: 3: pt is seeing Ita Vazquez    Pain  Currently in Pain: No/denies      Problem List    1. Malignant neoplasm of right female breast  Infusion Appointment    Infusion Appointment    DISCONTINUED: sodium chloride 0.9% 250 mL infusion    DISCONTINUED: diphenhydrAMINE injection 25 mg (BENADRYL)    DISCONTINUED: famotidine 20 mg/2 mL injection 20 mg (PEPCID)    DISCONTINUED: PACLitaxel 142 mg in dextrose 5% (non-PVC) 250 mL (TAXOL)    DISCONTINUED: sodium chloride 0.9 % flush 20 mL (NS)    DISCONTINUED: heparin 100 unit/mL lockflush (PF) porcine 300-600 Units    DISCONTINUED: diphenhydrAMINE injection 50 mg  (BENADRYL)    DISCONTINUED: famotidine 20 mg/2 mL injection 20 mg (PEPCID)    DISCONTINUED: hydrocortisone sod succ (PF) 100 mg/2 mL injection 100 mg    DISCONTINUED: acetaminophen tablet 1,000 mg (TYLENOL)   2. Oral yeast infection     3. Anemia associated with chemotherapy     4. Cytoxan-induced pulmonary interstitial disease       ______________________________________________________________________________    History of Present Illness    Diagnosis:     1. Invasive ductal carcinoma of the left breast:  Triple negative. Stage [id number]. Diagnosed July 2014. She had a positive margin at initial surgery. BRCA - .   2.  Invasive ductal carcinoma of the right breast: Triple negative.  Grade 3.  Clinical stage T2N1M0. Diagnosed July 2017.     Current Treatment:     Has started Dose Dense AC. Has finished 4 cycles and has started TAxol. We did give one cycle and then she had 2 weeks off due to pulmonary toxicity from Cytoxan. Has started back up and is here for week 4.      Past Treatment:     She had a lumpectomy followed by 4 cycles of Taxotere and Cytoxan. This was completed in October 2014. She then had a reresection for the positive margin. She then had adjuvant radiation completed in February 2015.      Interim History:  Pt is here today to continue on chemo. She is feeling much better since being on steroids. Her cough has gone away. Her breathing is better. She is eating a lot of prednisone and has gained weight. Has started a taper and is currently at 40mg daily. She denies any other new symptoms.     Pain Status  Currently in Pain: No/denies    Review of Systems    Constitutional  Constitutional (WDL): Exceptions to WDL  Fatigue: Fatigue relieved by rest  Neurosensory  Neurosensory (WDL): Exceptions to WDL  Ataxia: Asymptomatic, clinical or diagnostic observations only, intervention not indicated  Eye   Eye Disorder (WDL): Exceptions to WDL  Blurred Vision: Intervention not indicated  Ear  Ear Disorder (WDL):  Exceptions to WDL  Tinnitus: Mild symptoms, intervention not indicated  Cardiovascular  Cardiovascular (WDL): Exceptions to WDL  Edema: Yes  Edema Face: Localized facial edema  Pulmonary  Respiratory (WDL): Exceptions to WDL  Cough: Mild symptoms, nonprescription intervention indicated  Gastrointestinal  Gastrointestinal (WDL): Exceptions to WDL  Genitourinary  Genitourinary (WDL): All genitourinary elements are within defined limits  Lymphatic  Lymph (WDL): All lymph disorder elements are within defined limits  Musculoskeletal and Connective Tissue  Musculoskeletal and Connetive Tissue Disorders (WDL): Exceptions to WDL  Arthralgia: Mild pain  Myalgia: Mild pain  Integumentary  Integumentary (WDL): Exceptions to WDL  Alopecia: Hair loss of >50% normal for that individual that is readily apparent to others, a wig or hair piece is necessary if the patient desires to completely camouflage the hair loss, associated with psychosocial impact  Patient Coping  Patient Coping: Accepting  Distress Assessment  Distress Assessment Score: 3  Accompanied by  Accompanied by: Alone  Oral Chemo Adherence       Past History  Past Medical History:   Diagnosis Date     Asthma      Breast cancer 2014    left     Breast cancer 2017    right     Bronchitis, chronic      Colon polyp 2009    one precancerous polyp     Disease of thyroid gland      GERD (gastroesophageal reflux disease)      Pneumonia      S/P lumpectomy, left breast 7/16/14     Sinusitis      Uterine polyp        PHYSICAL EXAM:  /75  Pulse 98  Temp 98  F (36.7  C)  Wt 160 lb 3 oz (72.7 kg)  SpO2 100%  BMI 30.27 kg/m2  GENERAL: no acute distress. Cooperative in conversation. Here alone  HEENT: pupils are equal, round and reactive. Oromucosa is clean and intact. No ulcerations or mucositis noted. No bleeding noted. Pt has oral thrush noted  RESP: lungs are clear bilaterally per auscultation. Regular respiratory rate. No wheezes or rhonchi.  CV: Regular, rate and  rhythm. No murmurs.  ABD: soft, nontender. Positive bowel sounds. No organomegaly.   MUSCULOSKELETAL: No lower extremity swelling.   NEURO: non focal. Alert and oriented x3.   PSYCH: within normal limits. No depression or anxiety.  SKIN: warm dry intact, port is CDI in left chest wall.   LYMPH: no cervical, supraclavicular or axillary lymphadenopathy          Lab Results    Recent Results (from the past 168 hour(s))   Comprehensive Metabolic Panel   Result Value Ref Range    Sodium 138 136 - 145 mmol/L    Potassium 4.2 3.5 - 5.0 mmol/L    Chloride 102 98 - 107 mmol/L    CO2 27 22 - 31 mmol/L    Anion Gap, Calculation 9 5 - 18 mmol/L    Glucose 210 (H) 70 - 125 mg/dL    BUN 24 (H) 8 - 22 mg/dL    Creatinine 0.75 0.60 - 1.10 mg/dL    GFR MDRD Af Amer >60 >60 mL/min/1.73m2    GFR MDRD Non Af Amer >60 >60 mL/min/1.73m2    Bilirubin, Total 0.3 0.0 - 1.0 mg/dL    Calcium 9.4 8.5 - 10.5 mg/dL    Protein, Total 6.6 6.0 - 8.0 g/dL    Albumin 3.4 (L) 3.5 - 5.0 g/dL    Alkaline Phosphatase 83 45 - 120 U/L    AST 27 0 - 40 U/L    ALT 17 0 - 45 U/L   HM1 (CBC with Diff)   Result Value Ref Range    WBC 7.7 4.0 - 11.0 thou/uL    RBC 3.42 (L) 3.80 - 5.40 mill/uL    Hemoglobin 10.1 (L) 12.0 - 16.0 g/dL    Hematocrit 31.9 (L) 35.0 - 47.0 %    MCV 93 80 - 100 fL    MCH 29.5 27.0 - 34.0 pg    MCHC 31.7 (L) 32.0 - 36.0 g/dL    RDW 22.2 (H) 11.0 - 14.5 %    Platelets 220 140 - 440 thou/uL    MPV 9.3 8.5 - 12.5 fL    Neutrophils % 91 (H) 50 - 70 %    Lymphocytes % 6 (L) 20 - 40 %    Monocytes % 3 2 - 10 %    Eosinophils % 0 0 - 6 %    Basophils % 0 0 - 2 %    Neutrophils Absolute 6.9 2.0 - 7.7 thou/uL    Lymphocytes Absolute 0.5 (L) 0.8 - 4.4 thou/uL    Monocytes Absolute 0.2 0.0 - 0.9 thou/uL    Eosinophils Absolute 0.0 0.0 - 0.4 thou/uL    Basophils Absolute 0.0 0.0 - 0.2 thou/uL   Manual Differential   Result Value Ref Range    Platelet Estimate Normal Normal    Polychromasia 1+ (!) Negative    Schistocytes 1+ (!) Negative     Spherocytes 1+ (!) Negative       Imaging    No results found.      Signed by: Zaira Luna, CNP

## 2021-06-14 NOTE — PROGRESS NOTES
Psychology Psychotherapy  Note    Name:  Willa Light  :  1955  MRN:  [id number]      Date of Service: 2017  Duration: 60 minutes (11:00 AM -12:00 noon)    Target Symptoms:    The patient was seen in light of concerns regarding symptoms of anxiety related to her breast cancer as evidenced by patient and staff report.    Participation:  The patient was able to participate and benefit from treatment as evidenced by her verbal expression of ideas and initiation of topics discussed.    Mental Status:    Mood:  anxious, elevated and expansive  Affect:  anxious  Suicidal Ideation:  absent  Homicidal Ideation:  absent  Thought process:  flight of ideas  Thought content:  Normal  Fund of Knowledge:  Sufficient  Attention/Concentration:  intact  Language ability:  intact  Speech: normal  Memory:  recent and remote memory intact  Insight and Judgement:  good  Orientation:  person, place, time and situation  Appearance: casually-dressed,  and engaged,  Eye Contact:  Appropriate  Estimated IQ:  Average      Intervention:    Willa was interested in meeting to discuss how she is coping with her invasive ductal carcinoma of the left breast: Triple negative that was initially diagnosed in 2014.   She had a lumpectomy followed by 4 cycles of chemotherapy.  This was completed in 2014.  She then had further resection and had positive margins.  She had adjunct radiation that was completed in 2015.     Willa was  diagnosed with invasive ductal carcinoma of the right breast, triple negative, grade 3 in 2017.  She was scheduled to leave for an a trip to [place] three days after her diagnosis.  She started treatment when she returned.  She has been receiving chemotherapy.  She indicates that she had a medication reaction with Sepra which caused problems with shaking and balance.  She had a distress score of 5 at a recent clinic visit on 2017.  Willa processed her thoughts and  feelings at length about her breast cancer.  We also discussed strategies to help her cope.  She will be meeting with our genetic counselor, Geno Asencio tomorrow, 11/15/2017.  She also has a follow-up tomorrow with Zaira Luna, APURVA and chemotherapy.  She also indicates that she will be needing further surgery following her chemotherapy.  She anticipates that she will be meeting with the surgeon in the near future.    Willa [place] in [place], [place]  She states that she is [place] and Gnosticist.  Her dad  at age 96 3 years ago.  Her mother is 90 years old and has dementia.  She lives in a care facility in [facility].. She is the fourth oldest in a family of 6 children.  3 of her brothers still live on the family farm.  She also has a sister that lives in [place] and another sister who lives in [place].    Willa been  to her , Seven for 40 years.  They have met in college at [employer].  He is a .  She was working in [employer] prior to her cancer diagnosis.  She currently is not working. Willa and Seven have 2 sons who are both in their 30s and are engineers.  One of their sons is  and the other is in a committed relationship.  They both live in the [facility].     Willa indicates that she has a very stoic [place] family.  She feels that she has limited emotional support from her family as they do not like to talk about feelings.  She was interested in meeting with me today to process some of her thoughts and feelings about her cancer and also to establish care so that she can meet with me on an as-needed basis for ongoing support and assistance.    Psychoterapeutic Techniques:  Cognitive-behavioral therapy, motivational interviewing and supportive psychotherapy strategies were utilized.    Necessity:    The session was necessary for the care of the patient to address symptoms of  anxiety related to the patient's medical condition.    Progress:    Willa shared her story of her cancer journey.  She also shared her social history. Willa has a limited social support system.  She was interested in meeting to establish care so that she could have someone to turn to if she needed to process her thoughts and feelings related to her cancer.  She indicates that she tends to be a private person and is not interested in attending our breast cancer support group.      Plan:    Willa is interested in meeting with me on an as-needed basis for ongoing emotional support and assistance related to her cancer diagnosis and treatment.    Diagnosis:    1. Anxiety    2. Malignant neoplasm of upper-outer quadrant of right breast in female, estrogen receptor negative        Problem List:  Patient Active Problem List   Diagnosis     Malignant neoplasm of right female breast     GERD without esophagitis     Abnormal CT scan, chest     Cytoxan-induced pulmonary interstitial disease       Provider: Ita Vazquez MA, LP, LICSW    Date:  11/14/2017  Time:  5:04 PM

## 2021-06-14 NOTE — PROGRESS NOTES
Pt here for weekly taxol. states feeling ok.  Port accessed easily and labs obtained.  Treatment administered as directed without incident and port flushed and deaccessed upon completion. Pt d/c ambulatory to lobby alone

## 2021-06-14 NOTE — PROGRESS NOTES
[facility] Hematology and Oncology Progress Note    Patient: Willa Light  MRN: [id number]  Date of Service: December 8, 2017      Assessment and Plan:    Breast cancer    Primary site: Breast (Left)    Staging method: AJCC 7th Edition    Clinical: Stage IA (T1c, N0, cM0) - Signed by Moise Dee MD on 8/13/2014    Pathologic free text: [id number]    Summary: Stage IA (T1c, N0, cM0)    1. Breast cancer: Today she is due for her seventh of 12 doses of Taxol.  She is having a lot of side effects in general.  More specifically, she has sensory neuropathy that is worsening.  Includes the hands and feet and tongue as well as her fourth mouth.  It is gotten worse over the past week since her most recent treatment.  As such, we are going to hold her treatment today.  Will bring her back in 1 week.  If her neuropathy symptoms are improving then we will proceed with Taxol at a 25% dose reduction.  If they have not, then we will stop her chemotherapy and she will proceed to surgery.  We will make decisions about further chemotherapy postop based on pathologic findings.    2.  Pneumonitis: Bronchoscopy done on October 20.  Cultures negative.  She is responded to steroids.  She states her breathing feels great.  She is having a lot of side effects from the steroids.  We will start her on 5 mg daily today for 6 days and then she will stop her steroids.    3.  Rash: She has two rashes.  One from last week that was a more fungal infection which is almost resolved.  Last week when she was getting chemotherapy she developed an erythematous macular rash on the dorsum of the hands bilaterally.  No rales.  It is not itchy.  Not getting worse.  We will continue to watch.  Would expect to resolve.  She is using topical lotions for this.  I am not sure if topical steroids would help since she is on systemic steroids.    4.  Chemotherapy-induced peripheral neuropathy: Details as above.  For holding chemotherapy and see how  "she responds.  Can reintroduce treatment of her neuropathy improves over the next week.  I told her though this is unlikely to happen and will likely be done with chemotherapy at this point.    ECOG Performance   ECOG Performance Status: 1    Distress Assessment  Distress Assessment Score: No distress (\"I don't think about it\")    Pain  Currently in Pain: No/denies    Diagnosis:    1. Invasive ductal carcinoma of the left breast:  Triple negative. Stage [id number]. Diagnosed July 2014. She had a positive margin at initial surgery. BRCA-.    2.  Invasive ductal carcinoma of the right breast: Triple negative.  Grade 3.  Clinical stage T2N1M0.  Diagnosed July 2017.  MRI shows 3 foci and probable intervening tumor between these 3 foci.  Total size of abnormality in the MRI was 4.2 cm.  One suspicious-appearing node in the axilla.    3.  Pneumonitis: CT on October 11 shows diffuse bilateral mixed airspace and interstitial infiltrates.  Possibly due to chemotherapy.  Bronchoscopy was done on October 20.  Cultures negative.  She seemed to respond to steroids.    Treatment:    She had a lumpectomy followed by 4 cycles of Taxotere and Cytoxan. This was completed in October 2014. She then had a reresection for the positive margin. She then had adjuvant radiation completed in February 2015.     She is now being treated with neoadjuvant Adriamycin and Cytoxan for her second, most recent diagnosis.  Chemotherapy started on August 16, 2017.  Cycle 4 given on September 27.  Weekly Taxol started October 11.    Interim History:    Willa is here for follow-up visit.  Overall she is having some issues.  She has worsening peripheral neuropathy.  Now involves the anterior part of the tongue and the rest of the mouth.  Worsening in the fingertips.  It is affecting her fine motor function.  Also some worsening of the toes.  She is gaining a lot of weight from the steroids.  Also developing mood feces.  She has a rest tremor likely from the " steroids as well.  She had a fungal skin infection which was relatively disseminated last week.  This is also likely from the steroids.  This is also getting better.  Her breathing is improved.  Not coughing no shortness of breath.    Review of Systems:    Constitutional  Constitutional (WDL): Exceptions to WDL  Fatigue: Fatigue relieved by rest  Weight Gain: 5 - <10% from baseline (up 6lbs since 11/15/17)  Neurosensory  Neurosensory (WDL): Exceptions to WDL  Peripheral Motor Neuropathy: Asymptomatic, clinical or diagnostic observations only, intervention not indicated  Peripheral Sensory Neuropathy: Moderate symptoms, limiting instrumental ADL (tongue, roof of mouth; balls of feet; toes numb)  Cardiovascular  Cardiovascular (WDL): Exceptions to WDL  Edema Face: Moderate localized facial edema, limiting instrumental ADL (down to 10mg daily on prednisone)  Pulmonary  Respiratory (WDL): Within Defined Limits  Gastrointestinal  Gastrointestinal (WDL): Exceptions to WDL  Dysgeusia: Altered taste but no change in diet  Genitourinary  Genitourinary (WDL): Exceptions to WDL  Urinary Frequency: Present  Integumentary  Integumentary (WDL): Exceptions to WDL (rash on hands/wrists that started last week when getting chemo)  Alopecia: Hair loss of >50% normal for that individual that is readily apparent to others, a wig or hair piece is necessary if the patient desires to completely camouflage the hair loss, associated with psychosocial impact  Pruritus: Mild or localized, topical intervention indicated (skin fold rash - nystatin did not work; using at home remedies)  Patient Coping  Patient Coping: Accepting  Accompanied by  Accompanied by: Alone    Past History:    Past Medical History:   Diagnosis Date     Asthma      Breast cancer 2014    left     Breast cancer 2017    right     Bronchitis, chronic      Colon polyp 2009    one precancerous polyp     Disease of thyroid gland      GERD (gastroesophageal reflux disease)       Pneumonia      S/P lumpectomy, left breast 7/16/14     Sinusitis      Uterine polyp      Physical Exam:    Recent Vitals 12/6/2017   Weight 166 lbs 14 oz   /83   Pulse 99   Temp 97.9   Temp src 1   SpO2 97   Some recent data might be hidden     General: patient appears stated age of 62 y.o.. Nontoxic and in no distress.   HEENT: Head: atraumatic, normocephalic. Sclerae anicteric.   Chest:  Normal respiratory effort.   Cardiac:  No edema.  Abdomen: abdomen is non-distended  Extremities: normal tone and muscle bulk.  Skin: no lesions or rash. Warm and dry.   CNS: alert and oriented x3. Grossly non-focal.   Psychiatric: normal mood and affect.     Lab Results:    Recent Results (from the past 168 hour(s))   HM1 (CBC with Diff)   Result Value Ref Range    WBC 7.5 4.0 - 11.0 thou/uL    RBC 3.39 (L) 3.80 - 5.40 mill/uL    Hemoglobin 10.5 (L) 12.0 - 16.0 g/dL    Hematocrit 33.0 (L) 35.0 - 47.0 %    MCV 97 80 - 100 fL    MCH 31.0 27.0 - 34.0 pg    MCHC 31.8 (L) 32.0 - 36.0 g/dL    RDW 23.3 (H) 11.0 - 14.5 %    Platelets 253 140 - 440 thou/uL    MPV 9.0 8.5 - 12.5 fL    Neutrophils % 87 (H) 50 - 70 %    Lymphocytes % 7 (L) 20 - 40 %    Monocytes % 6 2 - 10 %    Eosinophils % 0 0 - 6 %    Basophils % 0 0 - 2 %    Neutrophils Absolute 6.2 2.0 - 7.7 thou/uL    Lymphocytes Absolute 0.5 (L) 0.8 - 4.4 thou/uL    Monocytes Absolute 0.4 0.0 - 0.9 thou/uL    Eosinophils Absolute 0.0 0.0 - 0.4 thou/uL    Basophils Absolute 0.0 0.0 - 0.2 thou/uL   Comprehensive Metabolic Panel   Result Value Ref Range    Sodium 141 136 - 145 mmol/L    Potassium 4.5 3.5 - 5.0 mmol/L    Chloride 105 98 - 107 mmol/L    CO2 28 22 - 31 mmol/L    Anion Gap, Calculation 8 5 - 18 mmol/L    Glucose 119 70 - 125 mg/dL    BUN 23 (H) 8 - 22 mg/dL    Creatinine 0.77 0.60 - 1.10 mg/dL    GFR MDRD Af Amer >60 >60 mL/min/1.73m2    GFR MDRD Non Af Amer >60 >60 mL/min/1.73m2    Bilirubin, Total 0.2 0.0 - 1.0 mg/dL    Calcium 9.6 8.5 - 10.5 mg/dL    Protein, Total  6.8 6.0 - 8.0 g/dL    Albumin 3.5 3.5 - 5.0 g/dL    Alkaline Phosphatase 74 45 - 120 U/L    AST 26 0 - 40 U/L    ALT 23 0 - 45 U/L   HM1 (CBC with Diff)   Result Value Ref Range    WBC 8.3 4.0 - 11.0 thou/uL    RBC 3.58 (L) 3.80 - 5.40 mill/uL    Hemoglobin 11.2 (L) 12.0 - 16.0 g/dL    Hematocrit 34.9 (L) 35.0 - 47.0 %    MCV 98 80 - 100 fL    MCH 31.3 27.0 - 34.0 pg    MCHC 32.1 32.0 - 36.0 g/dL    RDW 21.8 (H) 11.0 - 14.5 %    Platelets 260 140 - 440 thou/uL    MPV 8.8 8.5 - 12.5 fL     Imaging:    No results found.      Signed by: Moise Dee MD

## 2021-06-14 NOTE — PROGRESS NOTES
Pt here for weekly taxol.  Port accessed easily and labs obtained.  Pt feeling jittery from prednisone.  Also eating frequently.  Taxol given as ordered without incident and port flushed and deaccessed upon completion.  Pt d/c ambulatory to lobby alone

## 2021-06-14 NOTE — PROGRESS NOTES
[facility] Hematology and Oncology Progress Note    Patient: Willa Light  MRN: [id number]  Date of Service:         Reason for Visit    Chief Complaint   Patient presents with     HE Cancer       Assessment and Plan  Malignant neoplasm of right female breast    Staging form: Breast, AJCC 7th Edition    - Clinical: Stage IIB (T2(3), N1, M0) - Signed by Zaira Luna CNP on 8/30/2017          Prognostic indicators: Triple Negative    1. Breast cancer: hx of breast cancer of left breast in 2014, now with right sided breast cancer, clinically stage 2. She has started neoadjuvant chemo with dose dense AC weekly Taxol.  She was delayed a couple weeks due to pulmonary toxicity.  She is now back on track.  We actually did also have to hold last week due to neuropathy.  We are going to restart today with 25% reduction.    2.  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  Overall it has improved with the week off.  There are still some symptoms in her fingers but overall she can complete all of her ADLs.  We will continue the chemotherapy at 75% dosing.  We will continue to have to monitor this closely.  If he gets to a point where we have to stop the chemotherapy will have her go straight to surgery    3. Anemia: chemo induced and usually cumulative. Overall asymptomatic except fatigue. Continue to monitor.        ECOG Performance   ECOG Performance Status: 1     Distress Assessment  Distress Assessment Score: No distress:     Pain  Currently in Pain: No/denies  Pain Score (Initial OR Reassessment): No/Denies Pain      Problem List    1. Malignant neoplasm of right female breast  Infusion Appointment    Infusion Appointment    [facility] OFFICE VISIT LONG    Infusion Appointment    DISCONTINUED: sodium chloride 0.9% 250 mL infusion    DISCONTINUED: diphenhydrAMINE injection 25 mg (BENADRYL)    DISCONTINUED: famotidine 20 mg/2 mL injection 20 mg (PEPCID)    DISCONTINUED: PACLitaxel 109 mg in dextrose 5%  (non-PVC) 250 mL (TAXOL)    DISCONTINUED: sodium chloride 0.9 % flush 20 mL (NS)    DISCONTINUED: heparin 100 unit/mL lockflush (PF) porcine 300-600 Units    DISCONTINUED: diphenhydrAMINE injection 50 mg (BENADRYL)    DISCONTINUED: famotidine 20 mg/2 mL injection 20 mg (PEPCID)    DISCONTINUED: hydrocortisone sod succ (PF) 100 mg/2 mL injection 100 mg    DISCONTINUED: acetaminophen tablet 1,000 mg (TYLENOL)   2. Neuropathy due to chemotherapeutic drug     3. Anemia associated with chemotherapy       ______________________________________________________________________________    History of Present Illness    Diagnosis:     1. Invasive ductal carcinoma of the left breast:  Triple negative. Stage [id number]. Diagnosed July 2014. She had a positive margin at initial surgery. BRCA - .   2.  Invasive ductal carcinoma of the right breast: Triple negative.  Grade 3.  Clinical stage T2N1M0. Diagnosed July 2017.     Current Treatment:     Has started Dose Dense AC. Has finished 4 cycles and has started TAxol.  We did have to hold for a couple weeks due to some pulmonary toxicity.  She has now completed 6 weekly doses of the Taxol.  Last week was held due to neuropathy.  She is here for her seventh weekly dose.  We will give it today at 25% reduction     Past Treatment:     She had a lumpectomy followed by 4 cycles of Taxotere and Cytoxan. This was completed in October 2014. She then had a reresection for the positive margin. She then had adjuvant radiation completed in February 2015.      Interim History:  Pt is here today to continue on chemo.  She was held last week due to worsening peripheral neuropathy in her hands and mouth.  She states that her mouth symptoms have completely gone away.  She still continues to have numbness and tingling in her fingers.  The overall can do all of her ADLs but does have some issues with turning pages of a book.  SHe is now off steroids and is feeling good to be off them.  Her breathing and  lung symptoms have not returned.  Patient does feel that she can continue on chemo at this time.  She denies any other new symptoms.     Pain Status  Currently in Pain: No/denies    Review of Systems    Constitutional  Constitutional (WDL): Exceptions to WDL  Fatigue: Fatigue not relieved by rest - Limiting instrumental ADL (increased fatigue, but recently off prednisone)  Neurosensory  Neurosensory (WDL): Exceptions to WDL  Peripheral Sensory Neuropathy: Moderate symptoms, limiting instrumental ADL (Neuropathy, tongue and improved, hands and feet same)  Eye   Eye Disorder (WDL): Exceptions to WDL  Blurred Vision: Intervention not indicated (intermittent )  Ear  Ear Disorder (WDL): Exceptions to WDL  Tinnitus: Mild symptoms, intervention not indicated (stable)  Cardiovascular  Cardiovascular (WDL): Exceptions to WDL  Edema: Yes  Edema Face: Moderate localized facial edema, limiting instrumental ADL (improving since stopping prednisone)  Pulmonary  Respiratory (WDL): Within Defined Limits  Gastrointestinal  Gastrointestinal (WDL): Exceptions to WDL  Anorexia: Loss of appetite without alteration in eating habits (calming down since stopping prednisone)  Dysgeusia: Altered taste but no change in diet  Genitourinary  Genitourinary (WDL): Exceptions to WDL  Urinary Frequency: Present  Lymphatic  Lymph (WDL): All lymph disorder elements are within defined limits  Musculoskeletal and Connective Tissue  Musculoskeletal and Connetive Tissue Disorders (WDL): All Musculoskeletal and Connetive Tissue Disorder elements are within defined limits  Integumentary  Integumentary (WDL): Exceptions to WDL  Alopecia: Hair loss of >50% normal for that individual that is readily apparent to others, a wig or hair piece is necessary if the patient desires to completely camouflage the hair loss, associated with psychosocial impact  Rash Maculo-Papular: Macules/papules covering <10% BSA with or without symptoms (e.g., pruritus, burning,  tightness) (hands )  Pruritus: Mild or localized, topical intervention indicated  Patient Coping  Patient Coping: Accepting  Distress Assessment  Distress Assessment Score: No distress  Accompanied by  Accompanied by: Alone  Oral Chemo Adherence       Past History  Past Medical History:   Diagnosis Date     Asthma      Breast cancer 2014    left     Breast cancer 2017    right     Bronchitis, chronic      Colon polyp 2009    one precancerous polyp     Disease of thyroid gland      GERD (gastroesophageal reflux disease)      Pneumonia      S/P lumpectomy, left breast 7/16/14     Sinusitis      Uterine polyp        PHYSICAL EXAM:  /83  Pulse (!) 106  Temp 98.1  F (36.7  C) (Oral)   Wt 167 lb 12.8 oz (76.1 kg)  SpO2 95%  BMI 31.71 kg/m2  GENERAL: no acute distress. Cooperative in conversation. Here alone  HEENT: pupils are equal, round and reactive. Oromucosa is clean and intact. No ulcerations or mucositis noted. No bleeding noted. Pt has oral thrush noted  RESP: lungs are clear bilaterally per auscultation. Regular respiratory rate. No wheezes or rhonchi.  CV: Regular, rate and rhythm. No murmurs.  ABD: soft, nontender. Positive bowel sounds. No organomegaly.   MUSCULOSKELETAL: No lower extremity swelling.   NEURO: non focal. Alert and oriented x3.   PSYCH: within normal limits. No depression or anxiety.  SKIN: warm dry intact, port is CDI in left chest wall.   LYMPH: no cervical, supraclavicular or axillary lymphadenopathy          Lab Results    Recent Results (from the past 168 hour(s))   Comprehensive Metabolic Panel   Result Value Ref Range    Sodium 140 136 - 145 mmol/L    Potassium 4.3 3.5 - 5.0 mmol/L    Chloride 104 98 - 107 mmol/L    CO2 27 22 - 31 mmol/L    Anion Gap, Calculation 9 5 - 18 mmol/L    Glucose 124 70 - 125 mg/dL    BUN 21 8 - 22 mg/dL    Creatinine 0.85 0.60 - 1.10 mg/dL    GFR MDRD Af Amer >60 >60 mL/min/1.73m2    GFR MDRD Non Af Amer >60 >60 mL/min/1.73m2    Bilirubin, Total 0.3  0.0 - 1.0 mg/dL    Calcium 9.1 8.5 - 10.5 mg/dL    Protein, Total 6.8 6.0 - 8.0 g/dL    Albumin 3.2 (L) 3.5 - 5.0 g/dL    Alkaline Phosphatase 82 45 - 120 U/L    AST 26 0 - 40 U/L    ALT 16 0 - 45 U/L   HM1 (CBC with Diff)   Result Value Ref Range    WBC 8.0 4.0 - 11.0 thou/uL    RBC 3.74 (L) 3.80 - 5.40 mill/uL    Hemoglobin 11.3 (L) 12.0 - 16.0 g/dL    Hematocrit 36.0 35.0 - 47.0 %    MCV 96 80 - 100 fL    MCH 30.2 27.0 - 34.0 pg    MCHC 31.4 (L) 32.0 - 36.0 g/dL    RDW 19.4 (H) 11.0 - 14.5 %    Platelets 256 140 - 440 thou/uL    MPV 9.1 8.5 - 12.5 fL    Neutrophils % 74 (H) 50 - 70 %    Lymphocytes % 13 (L) 20 - 40 %    Monocytes % 12 (H) 2 - 10 %    Eosinophils % 1 0 - 6 %    Basophils % 1 0 - 2 %    Neutrophils Absolute 5.8 2.0 - 7.7 thou/uL    Lymphocytes Absolute 1.0 0.8 - 4.4 thou/uL    Monocytes Absolute 1.0 (H) 0.0 - 0.9 thou/uL    Eosinophils Absolute 0.1 0.0 - 0.4 thou/uL    Basophils Absolute 0.1 0.0 - 0.2 thou/uL       Imaging    No results found.      Signed by: Zaira Luna, CNP

## 2021-06-14 NOTE — PROGRESS NOTES
Pulmonary Clinic Follow-up Visit    Willa MunozDesmondDavid is a 62 y.o. female with a history of invasive ductal carcinoma of left breast in 2014, s/p chemo and radiation with lumpectomy, recent dx of invasive ductal carcinoma of right breast stage II s/p 4 cycles of adriamycin and cytoxan with taxol, who developed cyclophosphamide-induced pulmonary toxicity in October 2017. She responded very nicely steroids and her symptoms and radiographic abnormalities have resolved.     Recommendations:  -As noted previously, she should be monitored closley for any recurrence of pulmonary toxicity with taxol treatment, although she did tolerate this in the past  -Encouraged her to stay active and exercise  -Recommended yearly flu vaccine; she also needs both PCV13 and PSV23; she will check with her PMD as she thinks she may have gotten both already.    No further pulmonary follow up needed; she will call the office if she has any additional questions/concerns.    CCx: follow up for cytoxan-induced pulmonary interstitial disease    HPI:  Willa is here for follow up of Cytoxan-induced lung injury. Since I last saw her, she has been tapered off the prednisone. She did put on significant weight while on steroids. She is looking forward to exercising. She has no breathing limitations. Denies cough, fevers or chills.    ROS:  A 12-system review was obtained and was negative with the exception of the symptoms endorsed in the history of present illness.    PMH:  Past Medical History:   Diagnosis Date     Asthma      Breast cancer 2014    left     Breast cancer 2017    right     Bronchitis, chronic      Colon polyp 2009    one precancerous polyp     Disease of thyroid gland      GERD (gastroesophageal reflux disease)      Pneumonia      S/P lumpectomy, left breast 7/16/14     Sinusitis      Uterine polyp        PSH:  Past Surgical History:   Procedure Laterality Date     BREAST LUMPECTOMY [facility]    [facility]     COLONOSCOPY        "FOOT ARTHRODESIS, MODIFIED KATHY  may 2013 and oct 2013    hard to get hardware removed due to allergy to metal      FOOT SURGERY Right 2013     POLYPECTOMY  2009    Uterine polyp removed        Allergies:  Allergies   Allergen Reactions     Codeine Nausea Only     Cortisone (Hydrocortisone) [Hydrocortisone] Hives     Injection     Other Environmental Allergy      Seasonal Allergies, Mold      Pulmicort [Budesonide] Hives     Nickel Rash     Other Drug Allergy (See Comments) Rash     White Gold       Triamcinolone Rash     Zantac [Ranitidine Hcl] Nausea And Vomiting     Zofran (As Hydrochloride) [Ondansetron Hcl] Nausea And Vomiting       Family HX:  Family History   Problem Relation Age of Onset     Ovarian cancer Maternal Grandmother 60     biopsy showed adenocarcinoma, suspected ovarian primary     Colon cancer Maternal Grandfather 65     Breast cancer Cousin 50     maternal first-cousin, triple-negative     Hypertension Mother      Dementia Mother      Diabetes Father      Hypertension Father      Pneumonia Father      Hypertension Sister      Breast cancer Sister 69     Uterine cancer Sister 69     Diabetes Brother      Hypertension Brother      Hypertension Paternal Grandmother      Stroke Paternal Grandmother      Stomach cancer Paternal Grandfather 70     Hypertension Brother      Diabetes Brother      Hypertension Sister      Colon cancer Maternal Aunt 75     Prostate cancer Maternal Uncle      Diabetes Brother      Colon polyps Brother      starting in his 20s     Hypertension Son      Asthma Son      Thyroid cancer Cousin 57     maternal first-cousin, Hurthle cell     Cancer Other      \"female\" cancers among relatives to [facility] but specifics not known     Skin cancer Cousin      paternal first-cousin     Cancer Cousin      paternal first-cousin, unknown type     Uterine cancer Other 50     daughter to paternal first-cousin     Cancer Cousin      paternal first-cousin, head/neck       Social Hx:  Social " History     Social History     Marital status:      Spouse name: N/A     Number of children: N/A     Years of education: N/A     Occupational History     Not on file.     Social History Main Topics     Smoking status: Never Smoker     Smokeless tobacco: Never Used     Alcohol use No     Drug use: No     Sexual activity: No     Other Topics Concern     Not on file     Social History Narrative       Current Meds:  Current Outpatient Prescriptions   Medication Sig Dispense Refill     acetaminophen (TYLENOL) 500 MG tablet Take 1,000 mg by mouth 4 (four) times a day as needed for fever.       ALBUTEROL SULFATE INHL Inhale 2 puffs every 4 (four) hours as needed.        cetirizine (ZYRTEC) 10 MG tablet Take 10 mg by mouth every evening.        cholecalciferol, vitamin D3, 1,000 unit tablet Take 1,000 Units by mouth daily.       fexofenadine (ALLEGRA) 60 MG tablet Take 60 mg by mouth daily as needed.        lidocaine-prilocaine (EMLA) cream Apply to port site 1 hour before port needle access. 15 g 1     montelukast (SINGULAIR) 10 mg tablet Take 10 mg by mouth bedtime.       multivitamin therapeutic (THERAGRAN) tablet Take 1 tablet by mouth every evening.        pantoprazole (PROTONIX) 20 MG tablet Take 20 mg by mouth daily before breakfast.       sulfamethoxazole-trimethoprim (SEPTRA DS) 800-160 mg per tablet Take 1 tablet by mouth 3 (three) times a week. M, W, F       No current facility-administered medications for this visit.      Facility-Administered Medications Ordered in Other Visits   Medication Dose Route Frequency Provider Last Rate Last Dose     heparin 100 unit/mL lockflush (PF) porcine 600 Units  600 Units Intravenous PRN Moise Dee MD   600 Units at 12/06/17 1121     sodium chloride 0.9 % flush 10 mL (NS)  10 mL Intravenous PRN Moise Dee MD   10 mL at 12/06/17 1121       Physical Exam:  /86  Pulse (!) 110  Resp 18  Wt 167 lb (75.8 kg)  SpO2 97% Comment: RA at rest  BMI 31.55  kg/m2  Gen: awake, alert, oriented, no distress  HEENT: nasal turbinates are unremarkable, no oropharyngeal lesions, no cervical or supraclavicular lymphadenopathy  CV: RRR, no M/G/R  Resp: CTAB, no focal crackles or wheezes  Abd: soft, nontender, no palpable organomegaly  Skin: no apparent rashes  Ext: no cyanosis, clubbing or edema  Neuro: alert, nonfocal    Labs:  reviewed    Imaging studies:  CT chest 12/19: read pending but on my review the previous ground glass changes have resolved.    PFT's  Not available    Gera Swain MD  [facility]  Pager [phone]  Clinic [phone]

## 2021-06-14 NOTE — PROGRESS NOTES
Pt came into infusion clinic for Day 15, Cycle 6 of her treatment. Labs Reviewed. Medications explained to pt who verbalized understanding. Port patent throughout infusion. Pt tolerated infusion well but did report a small rash on hands at the end of the Taxol infusion. Pt states she is struggling with a fungal rash that she is using Nystatin for. Pt states she will apply some benadryl cream to her hands when she gets home or take PO Benadryl. Pt left infusion clinic via ambulatory.

## 2021-06-14 NOTE — PROGRESS NOTES
Pt ambulates to infusion center for lab draw prior to NP visit.  IVAD accessed, labs drawn et sent.  Pt seen by SUJATHA Luna CNP and orders approved.  Pt returns to infusion center for treatment.  Pre-meds and Taxol infused as ordered without complication.  IVAD flushed w/NS et heparin and needle deaccessed.  Pt left clinic stable to [facility].  Plan RTC as scheduled.

## 2021-06-14 NOTE — PROGRESS NOTES
Patient arrived ambulatory after clinic visit. Lab results reviewed and within parameters to receive chemotherapy. Port with blood return confirmed. Patient tolerated pre-medication and chemotherapy with no complaints/no signs of adverse reaction. Blood return confirmed at port prior to start and at end of chemotherapy. Port de-accessed in standard fashion. Patient discharged to home ambulatory and reports will return as scheduled for next appointment.

## 2021-06-14 NOTE — PROGRESS NOTES
[facility] GENETIC COUNSELING: CONSULT SUMMARY  Patient: Willa Light (1955 / 62 y.o., female) MRN: [id number]   [address] [facility] [address]      Date/Location of Visit: 11/15/2017, [facility]  Care Provider: Geno Asencio, MS, [facility] ([phone], [email or web address])  Referring Provider: Moise Dee MD  Present: Willa     PRESENTING CONCERN: previous genetic counseling and negative genetic testing in 2014; new diagnosis of breast cancer in 2017    MEDICAL: Willa was previously diagnosed with left breast cancer at age 59 (2014).  This was a grade 3 ER/MO/HER2- invasive ductal carcinoma.  She had a lumpectomy followed by chemotherapy, re-excision to achieve negative margins, and radiation.  I saw Willa on 8/6/2014, after her initial lumpectomy, and she completed the 9-gene GYNplus panel through [facility] with negative results (no mutations in BRCA1, BRCA2, PTEN, TP53, MLH1, MSH2, MSH6, PMS2, EPCAM).  Willa has recently returned to Cancer Care with a new diagnosis of grade 3 ER/MO/HER2-  invasive ductal carcinoma in the right breast.  Biopsy was completed on 7/5/2017 and she has started neoadjuvant chemotherapy.  She met with Dr. Gan and is a candidate for lumpectomy but will see her again after chemotherapy for final surgical decision-making.      Other cancer screening: One precancerous colon polyp removed in 2009.    Hormone/reproductive: Uterus and ovaries remain in place.  Normal Pap this year.    Exposures: No history of smoking.      No prior transplant or recent transfusion.    FAMILY: Three-generation pedigree was taken previously to assess cancer risk using information provided by Willa.  Updated with her today and scanned into her chart.        Significant for: Since our last visit Willa's oldest sister was diagnosed with what she describes as both breast and uterine cancers at age 69.  Willa's youngest brother has continued to have  colon polyps into his 50s having started in his 20s, unsure of type or number. Willa clarified that her maternal uncle had prostate cancer.  A maternal aunt had colon cancer in her 70s.  A maternal first-cousin had triple-negative breast cancer around age 50 and might have had genetic testing in the past (before Willa) but has not been able to obtain those results.  Another maternal first-cousin had a Hurthle cell thyroid cancer at age 57.  Maternal grandmother  at age 60 with adenocarcinoma of suspected ovarian origin although her death certificate reads uterine cancer.  Maternal grandfather had colon cancer and  at age 65.  There are paternal first-cousins with skin cancer, head/neck cancer, and an unknown cancer.  Another paternal first-cousin had a daughter who reportedly  of uterine cancer at age 50.  Paternal grandfather had stomach cancer in his 70s.  Willa has done a DNA-based ancestry test and results were consistent with previously known  descent.    The importance of accurate and verified history was emphasized.  In evaluating Willa's personal and family history we again reviewed differences between sporadic, environmental, and genetic contributions to cancer risk including relevant genetic principals and inheritance patterns.  I emphasized that the factors underlying cancer diagnosis are often multiple and complex.      ASSESSMENT: Willa's personal history is now significant for bilateral, triple-negative breast cancers and her family history is significant for other breast and gynecologic cancers (uterine and ovarian).  I also note the polyp history in Willa's brother and the two colon cancers on her maternal side.      I pointed out to Willa that her prior testing covered the most clear genetic risk factors for these cancers (BRCA1/2, Cowden syndrome, Evans syndrome).  Technology used for her prior testing is unchanged so I do not suspect we will obtain any different  results in that regard.      Rather, focus of our discussion was on other, newer genes that have been linked to these cancers.  PALB2 in particular has been linked with significantly increased risk for breast cancer, especially triple-negative breast cancer.  Other relevant breast cancer genes include NATALIE, CHEK2, NBN and ovarian cancer genes include BRIP1, RAD51C, RAD51D.  There are several genes that have been linked to polyposis but the history in Willa's brother is not specific enough to guide suspicion in this regard.  With all of this in mind we discussed the option of an updated multi-gene panel to more broadly include genes implicated in breast, gynecologic, and gastrointestinal cancers.      USE OF RESULTS:  We reviewed likelihood of possible results of genetic testing: positive, negative, and a variant of uncertain significance along with medical and social/psychological considerations. We reviewed cancer risks associated with hereditary predisposition and options for prevention and/or heightened surveillance if positive.      For example, women with inherited risk for breast cancer generally have a higher than average chance to develop additional primary (new) breast cancers following initial diagnosis and would be offered the option of bilateral mastectomy or screening with breast MRIs and mammograms.  Results are therefore relevant to Willa's upcoming breast surgery.  Willa also notes concern for ovarian cancer risk given her grandmother's history and would like to know whether she should be pursuing oophorectomy.  Discussed that this could be considered with mutations in some of the newer genes mentioned above although none have been linked as strongly to ovarian cancer as BRCA1/2.    With panel testing results may identify risks across a spectrum of cancer types and of varying magnitude including those that would be unexpected in the context of Willa's personal/family history.  Use of results  may be limited in the case of ambiguous findings, findings in poorly characterized genes, or involving cancers for which prevention or surveillance is not possible.  Risks and recommendations associated with both new and established genes may evolve over time.  Limitations of a negative result were emphasized including possibility of risk being linked to one or more other genes or mutations not detected by current methodologies.  In the event of another negative result the best estimate of ovarian cancer risk for Willa would be 3% based on her family history.    FAMILY:  Reviewed autosomal dominant inheritance.  If a mutation is identified targeted testing will be available to relatives and the importance of Willa sharing this with them was emphasized.  Testing is generally limited to adults although exceptions apply to genes associated with childhood cancer risks or rare childhood conditions.    Negative results for Willa will not necessarily equate to average cancer risk for her relatives and individual assessment would be encouraged to ensure adequate surveillance in the context of the family history.  Others may still consider their own genetic testing as well, especially the other relatives with cancer.    PSYCHOSOCIAL/DECISION-MAKING:  Willa expressed no hesitations or concerns as it pertains to genetic testing or her ability to cope with results.  She has good recollection of our prior meeting and is very motivated to take measures to prevent another cancer for herself at this point.    OTHER: Collection or assessment of other aspects of the family history not related to cancer risk were beyond the scope of our discussion today.    PLAN: Willa opted to pursue genetic testing for hereditary cancer today.      Test: 28-gene custom breast/gynecologic/gastrointestinal cancers panel  (BRCA1, BRCA2, PTEN, TP53, CDH1, CDKN2A, MLH1, MSH2, MSH6, PMS2, EPCAM, STK11, NF1, MUTYH, APC, POLD1, POLE, BMPR1A,  SMAD4, GREM1, AXIN2, NATALIE, BRIP1, CHEK2, NBN, PALB2, RAD51C, RAD51D)    Consent: verbal and written    Sample: blood    Laboratory: [facility]; I was able to talk to [facility] today and verify cost of $100 if this is denied by [facility] as it very well may be given Willa has already had BRCA testing    Results: expected in about 3 weeks and disclosure planned by phone; Willa is encouraged to contact me in the event of delays    It was a pleasure to meet Willa today and to participate in her care.  I am of course happy to address any follow-up questions/concerns should these arise for Willa or her providers. Willa should contact our office at least annually for updates or with changes to personal/family history as the clinic does not routinely re-contact individual patients with an increase or change in knowledge.            RON CASTRO MS, [facility]  Licensed Genetic Counselor  [facility]    Handouts or Enclosures:  contact information    Face-to-face time: 40 minutes    cc:   Willa Dee MD

## 2021-06-15 NOTE — PROGRESS NOTES
Pt here for last weekly taxol.  Denies new complaint and no problems with infusion.  Port flushed and deaccessed upon completion and pt d/c ambulatory to lobby alone.

## 2021-06-15 NOTE — PROGRESS NOTES
Pt arrived ambulatory to clinic for Cycle # 8 Day # 1 of her chemotherapy regimen.  Port was accessed using aseptic technique without difficulties with excellent blood return.  Administered premedications and chemotherapy per MD order.  Pt tolerated infusion well, no s/s of infusion reaction.  Port was flushed with NS and Heparin then de-accessed with 2x2 and papertape.  Pt verbalized understanding of plan of care and return to clinic.

## 2021-06-15 NOTE — PROGRESS NOTES
Pt ambulates to infusion center for labs and treatment.  Pt voices no new concerns today.  IVAD accessed, labs drawn et sent w/results noted.  Pre-meds and Taxol infused as ordered without complication.  IVAD flushed w/NS et heparin and needle deaccessed.  Pt left clinic stable to lobby.  Plan RTC as scheduled.

## 2021-06-15 NOTE — PROGRESS NOTES
[facility] Hematology and Oncology Progress Note    Patient: Willa Light  MRN: [id number]  Date of Service:         Reason for Visit    Chief Complaint   Patient presents with     HE Cancer     Breast Cancer       Assessment and Plan  Malignant neoplasm of right female breast    Staging form: Breast, AJCC 7th Edition    - Clinical: Stage IIB (T2(3), N1, M0) - Signed by Zaira Luna CNP on 8/30/2017          Prognostic indicators: Triple Negative    1. Breast cancer: hx of breast cancer of left breast in 2014, now with right sided breast cancer, clinically stage 2. She has started neoadjuvant chemo with dose dense AC weekly Taxol.  She was delayed a couple weeks due to pulmonary toxicity.  She will get her 10th weekly dose today. We will continue 25% reduction throughout the rest of her chemo. She will complete 12 weeks. We will then repeat breast MRI and then have her see Dr. Dee and Dr. Gan to prepare for surgery.     2.  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  Overall it has continued to improve slightly.  There are still some symptoms in her fingers and toes but overall she can complete all of her ADLs.  We will continue the chemotherapy at 75% dosing.  We will continue to have to monitor this closely.  If he gets to a point where we have to stop the chemotherapy will have her go straight to surgery    3. Anemia: chemo induced and usually cumulative. Overall asymptomatic except fatigue. Continue to monitor.        ECOG Performance   ECOG Performance Status: 1     Distress Assessment  Distress Assessment Score: No distress:     Pain  Currently in Pain: No/denies      Problem List    1. Malignant neoplasm of right female breast  CC OFFICE VISIT LONG    MR Breast Bilateral With Without Contras    Infusion Appointment    Infusion Appointment    HM1(CBC and Differential)    Comprehensive Metabolic Panel    DISCONTINUED: sodium chloride 0.9% 250 mL infusion    DISCONTINUED:  diphenhydrAMINE injection 25 mg (BENADRYL)    DISCONTINUED: famotidine 20 mg/2 mL injection 20 mg (PEPCID)    DISCONTINUED: PACLitaxel 109 mg in dextrose 5% (non-PVC) 250 mL (TAXOL)    DISCONTINUED: sodium chloride 0.9 % flush 20 mL (NS)    DISCONTINUED: heparin 100 unit/mL lockflush (PF) porcine 300-600 Units    DISCONTINUED: diphenhydrAMINE injection 50 mg (BENADRYL)    DISCONTINUED: famotidine 20 mg/2 mL injection 20 mg (PEPCID)    DISCONTINUED: hydrocortisone sod succ (PF) 100 mg/2 mL injection 100 mg    DISCONTINUED: acetaminophen tablet 1,000 mg (TYLENOL)     ______________________________________________________________________________    History of Present Illness    Diagnosis:     1. Invasive ductal carcinoma of the left breast:  Triple negative. Stage [id number]. Diagnosed July 2014. She had a positive margin at initial surgery. BRCA - .   2.  Invasive ductal carcinoma of the right breast: Triple negative.  Grade 3.  Clinical stage T2N1M0. Diagnosed July 2017.     Current Treatment:     Has started Dose Dense AC. Has finished 4 cycles and has started TAxol.  We did have to hold for a couple weeks due to some pulmonary toxicity.  She has now completed 6 weekly doses of the Taxol.  Last week was held due to neuropathy.  She is here for her tenth weekly dose.  We will give it today at 25% reduction     Past Treatment:     She had a lumpectomy followed by 4 cycles of Taxotere and Cytoxan. This was completed in October 2014. She then had a reresection for the positive margin. She then had adjuvant radiation completed in February 2015.      Interim History:  Pt is here today to continue on chemo.  We did decrease the chemo due to neuropathy. She still continues to have numbness and tingling in her fingers.  The overall can do all of her ADLs but does have some issues with turning pages of a book.  She does have some mouth sensitivities and intermittent mucositis. SHe is now off steroids and is feeling good to be  off them.  Her breathing and lung symptoms have not returned. She denies any other new symptoms.     Pain Status  Currently in Pain: No/denies    Review of Systems    Constitutional  Constitutional (WDL): Exceptions to WDL  Fatigue: Fatigue relieved by rest  Neurosensory  Neurosensory (WDL): Exceptions to WDL  Peripheral Motor Neuropathy: Asymptomatic, clinical or diagnostic observations only, intervention not indicated  Ataxia: Asymptomatic, clinical or diagnostic observations only, intervention not indicated  Peripheral Sensory Neuropathy: Moderate symptoms, limiting instrumental ADL (toes, bottoms of feet, fingertips getting better)  Eye   Eye Disorder (WDL): Exceptions to WDL  Watering Eyes: Intervention not indicated  Ear  Ear Disorder (WDL): Exceptions to WDL  Tinnitus: Mild symptoms, intervention not indicated (chronic)  Cardiovascular  Cardiovascular (WDL): Exceptions to WDL  Edema: Yes  Edema Face: Localized facial edema (hands)  Pulmonary  Respiratory (WDL): Exceptions to WDL (runny nose)  Cough: Mild symptoms, nonprescription intervention indicated (dry with cold weather)  Gastrointestinal  Gastrointestinal (WDL): Exceptions to WDL  Anorexia: Loss of appetite without alteration in eating habits  Dysgeusia: Altered taste but no change in diet  Genitourinary  Genitourinary (WDL): All genitourinary elements are within defined limits  Lymphatic  Lymph (WDL): All lymph disorder elements are within defined limits  Musculoskeletal and Connective Tissue  Musculoskeletal and Connetive Tissue Disorders (WDL): All Musculoskeletal and Connetive Tissue Disorder elements are within defined limits  Integumentary  Integumentary (WDL): Exceptions to WDL  Alopecia: Hair loss of >50% normal for that individual that is readily apparent to others, a wig or hair piece is necessary if the patient desires to completely camouflage the hair loss, associated with psychosocial impact  Rash Maculo-Papular: Macules/papules covering  <10% BSA with or without symptoms (e.g., pruritus, burning, tightness) (hands)  Patient Coping  Patient Coping: Accepting  Distress Assessment  Distress Assessment Score: No distress  Accompanied by  Accompanied by: Alone  Oral Chemo Adherence       Past History  Past Medical History:   Diagnosis Date     Asthma      Breast cancer 2014    left     Breast cancer 2017    right     Bronchitis, chronic      Colon polyp 2009    one precancerous polyp     Cytoxan-induced pulmonary interstitial disease      Disease of thyroid gland      GERD (gastroesophageal reflux disease)      Pneumonia      S/P lumpectomy, left breast 7/16/14     Sinusitis      Uterine polyp        PHYSICAL EXAM:  /78  Pulse 99  Temp 98.6  F (37  C) (Oral)   Wt 169 lb 14.4 oz (77.1 kg)  SpO2 93%  BMI 32.1 kg/m2  GENERAL: no acute distress. Cooperative in conversation. Here alone  HEENT: pupils are equal, round and reactive. Oromucosa is clean and intact. No ulcerations or mucositis noted. No bleeding noted. Pt has oral thrush noted  RESP: lungs are clear bilaterally per auscultation. Regular respiratory rate. No wheezes or rhonchi.  CV: Regular, rate and rhythm. No murmurs.  ABD: soft, nontender. Positive bowel sounds. No organomegaly.   MUSCULOSKELETAL: No lower extremity swelling.   NEURO: non focal. Alert and oriented x3.   PSYCH: within normal limits. No depression or anxiety.  SKIN: warm dry intact, port is CDI in left chest wall.   LYMPH: no cervical, supraclavicular or axillary lymphadenopathy          Lab Results    Recent Results (from the past 168 hour(s))   Comprehensive Metabolic Panel   Result Value Ref Range    Sodium 141 136 - 145 mmol/L    Potassium 4.2 3.5 - 5.0 mmol/L    Chloride 105 98 - 107 mmol/L    CO2 28 22 - 31 mmol/L    Anion Gap, Calculation 8 5 - 18 mmol/L    Glucose 122 70 - 125 mg/dL    BUN 12 8 - 22 mg/dL    Creatinine 0.93 0.60 - 1.10 mg/dL    GFR MDRD Af Amer >60 >60 mL/min/1.73m2    GFR MDRD Non Af Amer >60  >60 mL/min/1.73m2    Bilirubin, Total 0.3 0.0 - 1.0 mg/dL    Calcium 9.0 8.5 - 10.5 mg/dL    Protein, Total 6.8 6.0 - 8.0 g/dL    Albumin 3.3 (L) 3.5 - 5.0 g/dL    Alkaline Phosphatase 95 45 - 120 U/L    AST 35 0 - 40 U/L    ALT 18 0 - 45 U/L   HM1 (CBC with Diff)   Result Value Ref Range    WBC 7.1 4.0 - 11.0 thou/uL    RBC 3.57 (L) 3.80 - 5.40 mill/uL    Hemoglobin 10.9 (L) 12.0 - 16.0 g/dL    Hematocrit 34.0 (L) 35.0 - 47.0 %    MCV 95 80 - 100 fL    MCH 30.5 27.0 - 34.0 pg    MCHC 32.1 32.0 - 36.0 g/dL    RDW 18.4 (H) 11.0 - 14.5 %    Platelets 358 140 - 440 thou/uL    MPV 9.6 8.5 - 12.5 fL    Neutrophils % 76 (H) 50 - 70 %    Lymphocytes % 15 (L) 20 - 40 %    Monocytes % 8 2 - 10 %    Eosinophils % 1 0 - 6 %    Basophils % 0 0 - 2 %    Neutrophils Absolute 5.3 2.0 - 7.7 thou/uL    Lymphocytes Absolute 1.0 0.8 - 4.4 thou/uL    Monocytes Absolute 0.5 0.0 - 0.9 thou/uL    Eosinophils Absolute 0.1 0.0 - 0.4 thou/uL    Basophils Absolute 0.0 0.0 - 0.2 thou/uL       Imaging    Ct Chest Without Contrast    Result Date: 12/19/2017  CT CHEST WO CONTRAST 12/19/2017 10:24 AM INDICATION: Followup of Cytoxan induced pulmonary toxicity. TECHNIQUE: Routine chest. Dose reduction techniques were used. IV CONTRAST: None. COMPARISON: Chest CT, 10/11/2017. FINDINGS: LUNGS AND PLEURA: Minimal amount of atelectasis in the lingula. The lungs and pleural spaces are otherwise clear. The vague mixed airspace and interstitial opacities have resolved. No pneumothorax or pleural effusion. MEDIASTINUM: Heart size is normal. The thoracic aorta is normal in caliber. No lymphadenopathy. LIMITED UPPER ABDOMEN: Negative. MUSCULOSKELETAL: Negative.     CONCLUSION: 1.  Clear lungs.        Signed by: Zaira Luna, CNP

## 2021-06-15 NOTE — PROGRESS NOTES
Willa comes in for follow-up of her right breast cancer.  This is a patient has had a previous history of a left breast cancer who was diagnosed with multifocal cancer in her right breast about 6 months ago.  The biopsied showed triple negative breast cancer.  She is undergone neoadjuvant chemotherapy.  She had some trouble with the Cytoxan but is doing well now.  Her initial MRI showed multifocal disease and concern about one lymph node.  Her most recent MRI however shows no evidence of disease in her breasts.    Past medical history:  Past Medical History:   Diagnosis Date     Asthma      Breast cancer 2014    left     Breast cancer 2017    right     Bronchitis, chronic      Colon polyp 2009    one precancerous polyp     Cytoxan-induced pulmonary interstitial disease      Disease of thyroid gland      GERD (gastroesophageal reflux disease)      Pneumonia      S/P lumpectomy, left breast 7/16/14     Sinusitis      Uterine polyp        Current Outpatient Prescriptions:      acetaminophen (TYLENOL) 500 MG tablet, Take 1,000 mg by mouth 4 (four) times a day as needed for fever., Disp: , Rfl:      ALBUTEROL SULFATE INHL, Inhale 2 puffs every 4 (four) hours as needed. , Disp: , Rfl:      cetirizine (ZYRTEC) 10 MG tablet, Take 10 mg by mouth every evening. , Disp: , Rfl:      cholecalciferol, vitamin D3, 1,000 unit tablet, Take 1,000 Units by mouth daily., Disp: , Rfl:      fexofenadine (ALLEGRA) 60 MG tablet, Take 60 mg by mouth daily as needed. , Disp: , Rfl:      lidocaine-prilocaine (EMLA) cream, Apply to port site 1 hour before port needle access., Disp: 15 g, Rfl: 1     montelukast (SINGULAIR) 10 mg tablet, Take 10 mg by mouth bedtime., Disp: , Rfl:      multivitamin therapeutic (THERAGRAN) tablet, Take 1 tablet by mouth every evening. , Disp: , Rfl:      pantoprazole (PROTONIX) 20 MG tablet, Take 20 mg by mouth daily before breakfast., Disp: , Rfl:   No current facility-administered medications for this visit.  "    Facility-Administered Medications Ordered in Other Visits:      heparin 100 unit/mL lockflush (PF) porcine 600 Units, 600 Units, Intravenous, PRN, Moise Dee MD, 600 Units at 12/06/17 1121     sodium chloride 0.9 % flush 10 mL (NS), 10 mL, Intravenous, PRN, Moise Dee MD, 10 mL at 12/06/17 1121  Allergies   Allergen Reactions     Codeine Nausea Only     Cortisone (Hydrocortisone) [Hydrocortisone] Hives     Injection     Other Environmental Allergy      Seasonal Allergies, Mold      Pulmicort [Budesonide] Hives     Nickel Rash     Other Drug Allergy (See Comments) Rash     White Gold       Triamcinolone Rash     Zantac [Ranitidine Hcl] Nausea And Vomiting     Zofran (As Hydrochloride) [Ondansetron Hcl] Nausea And Vomiting     Review of systems is negative for full point review of systems.    Physical exam:  BP (!) 142/94 (Patient Site: Right Arm, Patient Position: Sitting, Cuff Size: Adult Regular)  Pulse (!) 101  Ht 5' 1\" (1.549 m)  Wt 168 lb (76.2 kg)  SpO2 98%  BMI 31.74 kg/m2  General: Healthy middle-aged woman in no acute distress.  She has alopecia secondary to chemo.  Lungs: Clear  CV: Regular  Chest: No palpable masses in either breast.  She has changes in the left breast consistent with previous lumpectomy.  Nothing palpable on the right.  Axilla: No axillary adenopathy.    Reviewed her old and current MRIs.    Impression: Aggressive right breast cancer, status post neoadjuvant chemotherapy with an excellent clinical response.  I went over the options for treatment.  Explained that her first mammogram was quite concerning and I did not think she would ever be a candidate for lumpectomy but since she has had a complete clinical response I think attempting a lumpectomy would be reasonable.  We would want to remove at least the 2 areas that were previously biopsied.  Explained that if they see anything in there then we should think about a mastectomy but if that is all negative I think just " getting radiation is very reasonable.  I also went over the Z 11 trial explained the controversy about doing sentinel lymph node biopsy after neoadjuvant chemotherapy.  Given that she has had such a great response I think is reasonable but did explain to her that we would try to take out at least 3 lymph nodes to decrease the possibility of a false negative.  The only thing that is a downside to her is the fact that she has a strong family history of breast cancer and she is now had bilateral breast cancer, this puts her at a higher risk for being a genetic carrier some sort of mutation.  She however is tested negative for all the known mutations.  Given all this and some thought she is opted to undergo a lumpectomy in the right side.    Plan: Right lumpectomy with wire localization and sentinel lymph node biopsy.  We will also plan to remove her port.  She asked appropriate questions and will get her set up for surgery in a few weeks.  I at least a couple weeks after she is completed chemo.

## 2021-06-15 NOTE — PROGRESS NOTES
[facility] Hematology and Oncology Progress Note    Patient: Willa Light  MRN: [id number]  Date of Service:  February 2, 2018      Assessment and Plan:    Breast cancer    Primary site: Breast (Left)    Staging method: AJCC 7th Edition    Clinical: Stage IA (T1c, N0, cM0) - Signed by Moise Dee MD on 8/13/2014    Pathologic free text: [id number]    Summary: Stage IA (T1c, N0, cM0)    1. Breast cancer: She has now completed her neoadjuvant chemotherapy.  She has been evaluated by surgery.  She will have surgery on February 12.  We will plan on seeing her back in about 3-4 weeks afterwards to review the results.  We will also review clinical trials we have.  Plan will be for her to get radiation afterwards.  Questions were answered.  Side effects are slowly improving.    2.  Pneumonitis: Symptoms have resolved.  She is now off steroids.     3.  Rash: The rash on the dorsum of her hands is slowly resolving.  This is most likely due to chemotherapy.  Rash on the torso has resolved.      4.  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  She states that the neuropathy in her tongue, hands, and feet is improving.  We will continue to follow clinically.      ECOG Performance   ECOG Performance Status: 1    Distress Assessment  Distress Assessment Score: No distress    Pain  Currently in Pain: No/denies    Diagnosis:    1. Invasive ductal carcinoma of the left breast:  Triple negative. Stage [id number]. Diagnosed July 2014. She had a positive margin at initial surgery. BRCA-.    2.  Invasive ductal carcinoma of the right breast: Triple negative.  Grade 3.  Clinical stage T2N1M0.  Diagnosed July 2017.  MRI shows 3 foci and probable intervening tumor between these 3 foci.  Total size of abnormality in the MRI was 4.2 cm.  One suspicious-appearing node in the axilla.    3.  Pneumonitis: CT on October 11 shows diffuse bilateral mixed airspace and interstitial infiltrates.  Possibly  due to chemotherapy.  Bronchoscopy was done on October 20.  Cultures negative.  She seemed to respond to steroids.    Treatment:    She had a lumpectomy followed by 4 cycles of Taxotere and Cytoxan. This was completed in October 2014. She then had a reresection for the positive margin. She then had adjuvant radiation completed in February 2015.     She was treated with neoadjuvant Adriamycin and Cytoxan for her second, most recent diagnosis.  Chemotherapy started on August 16, 2017.  Cycle 4 given on September 27.  Weekly Taxol started October 11.  Taxol reduced 25% for neuropathy.    Interim History:    Willa is here for follow-up visit.  Side effects are slowly improving since stopping chemotherapy.  Still has neuropathy in her hands and feet.  Rash is improving.  Appetite is okay.  Still has watery eyes and runny nose.    Review of Systems:    Constitutional  Constitutional (WDL): Exceptions to WDL  Fatigue: Fatigue relieved by rest  Neurosensory  Neurosensory (WDL): Exceptions to WDL  Peripheral Motor Neuropathy: Asymptomatic, clinical or diagnostic observations only, intervention not indicated  Peripheral Sensory Neuropathy: Moderate symptoms, limiting instrumental ADL (hands are better but feet are much slower but getting better)  Cardiovascular  Cardiovascular (WDL): Exceptions to WDL  Edema: Yes  Edema Face: Localized facial edema  Pulmonary  Respiratory (WDL): Exceptions to WDL  Cough: Mild symptoms, nonprescription intervention indicated (dry in the evenings )  Gastrointestinal  Gastrointestinal (WDL): Exceptions to WDL  Anorexia: Loss of appetite without alteration in eating habits  Dysgeusia: Altered taste but no change in diet  Genitourinary  Genitourinary (WDL): All genitourinary elements are within defined limits  Integumentary  Integumentary (WDL): Exceptions to WDL  Alopecia: Hair loss of >50% normal for that individual that is readily apparent to others, a wig or hair piece is necessary if the  patient desires to completely camouflage the hair loss, associated with psychosocial impact  Rash Maculo-Papular: Macules/papules covering <10% BSA with or without symptoms (e.g., pruritus, burning, tightness) (getting better)  Patient Coping  Patient Coping: Accepting  Accompanied by  Accompanied by: Alone    Past History:    Past Medical History:   Diagnosis Date     Asthma      Breast cancer 2014    left     Breast cancer 2017    right     Bronchitis, chronic      Colon polyp 2009    one precancerous polyp     Cytoxan-induced pulmonary interstitial disease      Disease of thyroid gland      GERD (gastroesophageal reflux disease)      Pneumonia      S/P lumpectomy, left breast 7/16/14     Sinusitis      Uterine polyp      Physical Exam:    Recent Vitals 2/2/2018   Height -   Weight 167 lbs 10 oz   BSA (m2) -   /88   Pulse 96   Temp 98.8   Temp src 1   SpO2 97   Some recent data might be hidden     General: patient appears stated age of 62 y.o.. Nontoxic and in no distress.   HEENT: Head: atraumatic, normocephalic. Sclerae anicteric.   Chest:  Normal respiratory effort.   Cardiac:  No edema.  Abdomen: abdomen is soft, non-distended  Extremities: normal tone and muscle bulk.  Skin: no lesions or rash. Warm and dry.   CNS: alert and oriented x3. Grossly non-focal.   Psychiatric: normal mood and affect.     Lab Results:    Results for FARHAD YOON (MRN [id number]) as of 2/2/2018 13:10   Ref. Range 1/17/2018 11:29   WBC Latest Ref Range: 4.0 - 11.0 thou/uL 6.4   RBC Latest Ref Range: 3.80 - 5.40 mill/uL 3.60 (L)   Hemoglobin Latest Ref Range: 12.0 - 16.0 g/dL 10.9 (L)   Hematocrit Latest Ref Range: 35.0 - 47.0 % 33.7 (L)   MCV Latest Ref Range: 80 - 100 fL 94   MCH Latest Ref Range: 27.0 - 34.0 pg 30.3   MCHC Latest Ref Range: 32.0 - 36.0 g/dL 32.3   RDW Latest Ref Range: 11.0 - 14.5 % 18.3 (H)   Platelets Latest Ref Range: 140 - 440 thou/uL 320   MPV Latest Ref Range: 8.5 - 12.5 fL 9.7   Neutrophils %  Latest Ref Range: 50 - 70 % 75 (H)   Lymphocytes % Latest Ref Range: 20 - 40 % 15 (L)   Monocytes % Latest Ref Range: 2 - 10 % 9   Eosinophils % Latest Ref Range: 0 - 6 % 1   Basophils % Latest Ref Range: 0 - 2 % 0   Neutrophils Absolute Latest Ref Range: 2.0 - 7.7 thou/uL 4.8   Lymphocytes Absolute Latest Ref Range: 0.8 - 4.4 thou/uL 1.0   Monocytes Absolute Latest Ref Range: 0.0 - 0.9 thou/uL 0.6   Eosinophils Absolute Latest Ref Range: 0.0 - 0.4 thou/uL 0.1   Basophils Absolute Latest Ref Range: 0.0 - 0.2 thou/uL 0.0     No results found for this or any previous visit (from the past 168 hour(s)).     Imaging:    Mr Breast Bilateral With Without Contras    Result Date: 1/8/2018  MR BREAST W WO CONTRAST BILATERAL 1/8/2018 7:27 AM INDICATION: breast cancer, s/p neoadjuvant chemo TECHNIQUE: 3D high-resolution images, vascular subtraction images with 7.5 mL Gadavist and MIP images performed. The study was processed using a [employer] computer-aided detection system to optimize radiologist interpretation by generating multiplanar and 3D reconstructions, creating subtraction images from the dynamic contrast data, and computing tumor volumes and dimensions. COMPARISON: MRI from 8/3/2017 RIGHT BREAST: There is moderate background enhancement. The breast tissue is composed of scattered fibroglandular densities. A marker clip from a prior biopsy is again seen in the upper outer quadrant in zone 2 in approximately the 10:00 position. Multifocal highly suspicious enhancement previously seen in the region of the biopsy clip has resolved. Minimal residual enhancement cannot be excluded given the moderate background enhancement. However, there is no evidence of suspicious mass or distortion, as seen on the prior exam. Subcentimeter lymph nodes in the low right axilla do not appear to be significantly changed. MIP images confirm these findings. LEFT BREAST: There is moderate background enhancement. The breast tissue is composed of  scattered fibroglandular densities. Postlumpectomy changes are again seen in the posterior breast just lateral to the nipple line. There are no findings to suggest malignancy. There is artifact from the patient's Port-A-Cath. The axilla is unremarkable. MIP images confirm these findings.     Biopsy-proven malignancy in the right breast can no longer be visualized on MRI. Small lymph nodes in the right axilla, some of which are slightly round, are unchanged. Postlumpectomy changes are again seen in the left breast. RIGHT BREAST: ACR BI-RADS Category 6: Known Biopsy-Proven Malignancy. LEFT BREAST: ACR BI-RADS Category 2: Benign.      Signed by: Moise Dee MD

## 2021-06-15 NOTE — PROGRESS NOTES
Pt here for weekly taxol.  Port accessed easily and labs obtained.  No problems with infusion as directed.  Pt denies new complaint.  Upon completion port flushed and deaccessed and pt d/c ambulatory to lobby alone

## 2021-06-16 PROBLEM — G62.0 NEUROPATHY DUE TO CHEMOTHERAPEUTIC DRUG (H): Status: ACTIVE | Noted: 2017-12-14

## 2021-06-16 PROBLEM — K21.9 GERD WITHOUT ESOPHAGITIS: Status: ACTIVE | Noted: 2017-09-14

## 2021-06-16 PROBLEM — Z17.1 MALIGNANT NEOPLASM OF UPPER-OUTER QUADRANT OF RIGHT BREAST IN FEMALE, ESTROGEN RECEPTOR NEGATIVE (H): Status: ACTIVE | Noted: 2017-08-30

## 2021-06-16 PROBLEM — T45.1X5A NEUROPATHY DUE TO CHEMOTHERAPEUTIC DRUG (H): Status: ACTIVE | Noted: 2017-12-14

## 2021-06-16 PROBLEM — C50.411 MALIGNANT NEOPLASM OF UPPER-OUTER QUADRANT OF RIGHT BREAST IN FEMALE, ESTROGEN RECEPTOR NEGATIVE (H): Status: ACTIVE | Noted: 2017-08-30

## 2021-06-16 NOTE — PROGRESS NOTES
Patient here ambulatory for radiation consult for right breast cancer.  Patient had left breast cancer with radiation treatment completing in 2/2015.  Patient state she has all written information given her from last treatment and declines additional written information again this time.  Reviewed process and potential side effects and patient able to verbalize understanding.  Questions answered.  Seen by Dr. Lin.  CT simulation to be done today.  Plan RTC for treatment as directed by physician.

## 2021-06-16 NOTE — PROGRESS NOTES
[facility] Progress Note    Patient: Willa Light  MRN: [id number]  Date of Service:  March 9, 2018      Assessment and Plan:    Breast cancer    Primary site: Breast (Left)    Staging method: AJCC 7th Edition    Clinical: Stage IA (T1c, N0, cM0) - Signed by Moise Dee MD on 8/13/2014    Pathologic free text: [id number]-    Summary: Stage IA (T1c, N0, cM0)    1. Breast cancer: She had breast conservation surgery on February 12.  Pathology showed no evidence of disease.  She has recovered from surgery well.  She will now have radiation.  No role for any adjuvant therapy at this point.  We will see her back in clinic in 4 months.  She will let us know in the interim if she develops any new symptoms.      2.  Chemotherapy-induced peripheral neuropathy: Grade 1.  Slowly improving.  Still has some on the tongue.  Monitor clinically.      ECOG Performance   ECOG Performance Status: 0    Distress Assessment  Distress Assessment Score: No distress    Pain  Currently in Pain: No/denies    Diagnosis:    1. Invasive ductal carcinoma of the left breast:  Triple negative. Stage U3bP1Ax. Diagnosed July 2014. She had a positive margin at initial surgery. BRCA-.    2.  Invasive ductal carcinoma of the right breast: Triple negative.  Grade 3.  Clinical stage T2N1M0.  Diagnosed July 2017.  MRI shows 3 foci and probable intervening tumor between these 3 foci.  Total size of abnormality in the MRI was 4.2 cm.  One suspicious-appearing node in the axilla.    3.  Pneumonitis: CT on October 11 shows diffuse bilateral mixed airspace and interstitial infiltrates.  Possibly due to chemotherapy.  Bronchoscopy was done on October 20.  Cultures negative.  She seemed to respond to steroids.    Treatment:    She had a lumpectomy followed by 4 cycles of Taxotere and Cytoxan. This was completed in October 2014. She then had a reresection for the positive margin. She then had adjuvant radiation completed in  February 2015.     She was treated with neoadjuvant Adriamycin and Cytoxan for her second, most recent diagnosis.  Chemotherapy started on August 16, 2017.  Cycle 4 given on September 27.  Weekly Taxol started October 11.  Taxol reduced 25% for neuropathy.  Last was completed on January 17, 2018    Right-sided lumpectomy performed in February 12, 2018.  Pathology showed complete response.  No residual disease.    Interim History:    Willa is here for follow-up visit.  She had surgery just about a month ago.  Feeling well.  Neuropathy is improving.  Energy and appetite are improving.  No acute complaints today.  Skin rash has resolved.    Review of Systems:    Constitutional  Constitutional (WDL): All constitutional elements are within defined limits  Neurosensory  Neurosensory (WDL): Exceptions to WDL  Peripheral Sensory Neuropathy: Asymptomatic, loss of deep tendon reflexes or paresthesia (tips of toes, bottoms of feet, very minimal in fingertips)  Cardiovascular  Cardiovascular (WDL): All cardiovascular elements are within defined limits  Pulmonary  Respiratory (WDL): Exceptions to WDL (hx - allergies)  Gastrointestinal  Gastrointestinal (WDL): Exceptions to WDL  Dysgeusia: Altered taste but no change in diet (improved )  Genitourinary  Genitourinary (WDL): All genitourinary elements are within defined limits  Integumentary  Integumentary (WDL): Exceptions to WDL (s/p right lumpectoy 5 week ago)  Patient Coping  Patient Coping: Accepting  Accompanied by  Accompanied by: Alone    Past History:    Past Medical History:   Diagnosis Date     Asthma      Breast cancer 2014    left     Breast cancer 2017    right     Bronchitis, chronic      Colon polyp 2009    one precancerous polyp     Cytoxan-induced pulmonary interstitial disease      Disease of thyroid gland      GERD (gastroesophageal reflux disease)      Peripheral neuropathy     hands and feet     Pneumonia      S/P lumpectomy, left breast 7/16/14      Sinusitis      Uterine polyp      Physical Exam:    Recent Vitals 3/9/2018   Height -   Weight 165 lbs 8 oz   BSA (m2) -   /83   Pulse 89   Temp 98.6   Temp src 1   SpO2 100   Some recent data might be hidden     General: patient appears stated age of 62 y.o.. Nontoxic and in no distress.   HEENT: Head: atraumatic, normocephalic. Sclerae anicteric.   Chest:  Normal respiratory effort.   Cardiac:  No edema.  Abdomen: abdomen is non-distended  Extremities: normal tone and muscle bulk.  Skin: no lesions or rash. Warm and dry.   CNS: alert and oriented x3. Grossly non-focal.   Psychiatric: normal mood and affect.     Lab Results:    No results found for this or any previous visit (from the past 168 hour(s)).     Imaging:    Mammo Breast Specimen    Result Date: 2/14/2018  MAMMO BREAST SPECIMEN 2/14/2018 1:25 PM INDICATION: Status post right breast lumpectomy for multifocal invasive ductal carcinoma. Pathology requested radiograph of the specimen cassettes for identification of the 2 biopsy clips. COMPARISON: Mammograms and specimen radiograph dated 02/12/2018. FINDINGS: The cassettes were numbered by pathology department, however the numbers are not radiopaque. 2 cassettes contain the 2 biopsy clips. These 2 cassettes were marked as requested and returned to pathology department.    Mammo Breast Specimen    Result Date: 2/12/2018  MAMMO BREAST SPECIMEN 2/12/2018 9:48 AM INDICATION: Right breast cancer status post neoadjuvant chemotherapy. COMPARISON: Wire localization earlier on same date FINDINGS: The specimen radiograph demonstrates the targeted biopsy marking clips to be included in the specimen. There is an associated ill-defined mass central aspect of the specimen. Findings were called to the OR at the time of imaging.     Mammo Post Clip Placement Right    Result Date: 2/12/2018  US BREAST LOC W SENT NODE INJ RIGHT, MAMMO POST CLIP PLACEMENT RIGHT 2/12/2018 8:25 AM INDICATION: Preoperative localization of  the 2 biopsy clips at sites of biopsy-proven carcinomas, both right breast at 10:00 position, zone 2. Preoperative identification of the right axillary sentinel lymph node. PROCEDURE: Informed consent was obtained from the patient. The skin was prepped and lidocaine used for local anesthesia. Under direct sonographic guidance, a 20 gauge needle was advanced through the target and a localization wire deployed adjacent to biopsy clip 10:00 zone 2 lateral. Under direct sonographic guidance, a second 20 gauge needle was advanced through the target and a localization wire deployed adjacent to the second biopsy clip located at 10:00 zone 2, medial. Postprocedure mammograms demonstrate the wires in appropriate position. The patient tolerated this well. The skin was prepped. 515 microcuries of technetium-99m Lymphoseek were injected in an intradermal fashion at the areolar margin. The patient tolerated this well.     1. Sonographically-guided breast needle/wire localization. 2. Post procedure mammogram for wire placement. 3. Lymphoseek injection.     Image Guided Breast Loc W Sent Node Inj Right    Result Date: 2/12/2018   BREAST LOC W SENT NODE INJ RIGHT, MAMMO POST CLIP PLACEMENT RIGHT 2/12/2018 8:25 AM INDICATION: Preoperative localization of the 2 biopsy clips at sites of biopsy-proven carcinomas, both right breast at 10:00 position, zone 2. Preoperative identification of the right axillary sentinel lymph node. PROCEDURE: Informed consent was obtained from the patient. The skin was prepped and lidocaine used for local anesthesia. Under direct sonographic guidance, a 20 gauge needle was advanced through the target and a localization wire deployed adjacent to biopsy clip 10:00 zone 2 lateral. Under direct sonographic guidance, a second 20 gauge needle was advanced through the target and a localization wire deployed adjacent to the second biopsy clip located at 10:00 zone 2, medial. Postprocedure mammograms demonstrate  the wires in appropriate position. The patient tolerated this well. The skin was prepped. 515 microcuries of technetium-99m Lymphoseek were injected in an intradermal fashion at the areolar margin. The patient tolerated this well.     1. Sonographically-guided breast needle/wire localization. 2. Post procedure mammogram for wire placement. 3. Lymphoseek injection.       Signed by: Moise Dee MD

## 2021-06-16 NOTE — PROGRESS NOTES
In for follow-up of her right lumpectomy  with sentinel lymph node biopsy.  She is feeling well.  She is having very minimal pain.      Physical exam:  Appears well.  Does not appear in any discomfort.  Breasts: Incisions are healing nicely with no signs of infection.  No swelling.    Pathology: There was no residual tumor. Five lymph nodes were completely normal.    Impression: Postop visit. Doing well.  Complete pathologic response.  She has a good prognosis.  One thing we did talk about is the fact that she has had 2 triple negative breast cancers.  Despite the fact that her genetic testing was all negative, I think she should consider oophorectomy.  There is definitely some crossover between triple negatives and ovarian cancer.  She is going to talk to her OB/GYN about this.    Plan: Refer on for radiation.  Will follow up with me in about July with bilateral mammograms at that time.

## 2021-06-16 NOTE — ANESTHESIA POSTPROCEDURE EVALUATION
Patient: Willa Light  RIGHT BREAST LUMPECTOMY WIRE LOCALIZATION  RIGHT SENTINEL LYMPH NODE BIOPSY AND PORT REMOVAL, SENTINEL LYMPH NODE BIOPSY @ 0730, PORT REMOVAL  Anesthesia type: general    Patient location: PACU  Last vitals:   Vitals:    02/12/18 1100   BP: 122/79   Pulse: 84   Resp: 14   Temp:    SpO2: 95%     Post vital signs: stable  Level of consciousness: awake and responds to simple questions  Post-anesthesia pain: pain controlled  Post-anesthesia nausea and vomiting: no  Pulmonary: unassisted  Cardiovascular: stable  Hydration: adequate  Anesthetic events: no    QCDR Measures:  ASA# 11 - [place]-op Cardiac Arrest: ASA11B - Patient did NOT experience unanticipated cardiac arrest  ASA# 12 - [place]-op Mortality Rate: ASA12B - Patient did NOT die  ASA# 13 - PACU Re-Intubation Rate: ASA13B - Patient did NOT require a new airway mgmt  ASA# 10 - Composite Anes Safety: ASA10A - No serious adverse event    Additional Notes:

## 2021-06-16 NOTE — PROGRESS NOTES
RADIATION ONCOLOGY WEEKLY TREATMENT VISIT NOTE      Assessment / Impression       1. Malignant neoplasm of upper-outer quadrant of right breast in female, estrogen receptor negative       Malignant neoplasm of upper-outer quadrant of right breast in female, estrogen receptor negative    Staging form: Breast, AJCC 7th Edition    - Clinical: Stage IIB (T2(3), N1, M0) - Signed by Zaira Luna CNP on 2017          ER Status: Negative          IL Status: Negative          HER2 Status: Negative    - Pathologic stage from 3/19/2018: yT0, N0, cM0 - Signed by Selena Lin MD on 3/19/2018     Tolerating radiation therapy well.  All questions and concerns addressed.    Plan:   Started prednisone/allegra/inhaled steroid today   Continue radiation treatment as prescribed.  Radiation: Site: right breast  Today's Dose: 180  Total Dose for Breast: 6040  Today's Fraction/Total Fraction Breast:     Subjective:      HPI: Willa Light is a 62 y.o. female with    1. Malignant neoplasm of upper-outer quadrant of right breast in female, estrogen receptor negative         The following portions of the patient's history were reviewed and updated as appropriate: allergies, current medications, past family history, past medical history, past social history, past surgical history and problem list.    Assessment                  Body Site: Breast                           Site: right breast  Today's Dose: 180  Total Dose for Breast: 6040  Today's Fraction/Total Fraction Breast:   Drainage: 0: Absent                                            Sexuality Alteration                 Emotional Alteration Copin: Effective  Comfort Alteration KPS: 90% Can perform normal activity, minor signs of disease  Fatigue (ONS scale) : 5: Moderate Fatigue  Pain Location: denies  Hot Flashes and/or Flushes: 1: Mild or no more than 1 per day   Nutrition Alteration Anorexia: 0: None  Nausea: 0:  None  Vomitin: None  Skin Alteration Skin Sensation: 0: No problem  Skin Reaction: 0: None (radioplex given with instruction)  AUA Assessment                                  Accompanied by       Objective:     Exam:     Vitals:    18 0818   BP: 158/87   Pulse: 98   Temp: 97.7  F (36.5  C)   TempSrc: Oral   SpO2: 98%   Weight: 165 lb 9.6 oz (75.1 kg)       Wt Readings from Last 8 Encounters:   18 165 lb 9.6 oz (75.1 kg)   18 166 lb 9.6 oz (75.6 kg)   18 165 lb 8 oz (75.1 kg)   18 163 lb (73.9 kg)   18 167 lb 9.6 oz (76 kg)   18 168 lb (76.2 kg)   18 169 lb 14.4 oz (77.1 kg)   17 167 lb (75.8 kg)       General: Alert and oriented, in no acute distress  Willa has no Erythema.    Treatment Summary to Date    Aria chart and setup information reviewed    I, Selena Lin MD personally performed the services described in this documentation, as scribed by Reilly Roth in my presence, and it is both accurate and complete.    Signed by: Selena Lin MD, MPH

## 2021-06-16 NOTE — PROGRESS NOTES
[facility] Radiation Oncology Consult Note    Patient: Willa Light  MRN: [id number]  Date of Service: 03/19/2018    Assessment / Impression     1. Malignant neoplasm of upper-outer quadrant of right breast in female, estrogen receptor negative       Malignant neoplasm of upper-outer quadrant of right breast in female, estrogen receptor negative    Staging form: Breast, AJCC 7th Edition    - Clinical: Stage IIB (T2(3), N1, M0) - Signed by Zaira Luna CNP on 8/30/2017          ER Status: Negative          FL Status: Negative          HER2 Status: Negative    - Pathologic stage from 3/19/2018: yT0, N0, cM0 - Signed by Selena Lin MD on 3/19/2018  ECOG Peformance Status  ECOG Performance Status: 0  Distress Assessment Score: 2    Plan:   1. Obtain CT Sim for further planning.  2. Given patient's previous reaction to radiation, will start her on 5 mg prednisone to begin on first day of radiation.    Face to face time  60 minutes with > 75% spent on consultation, education and coordination of care.  Intent of Therapy: Curative  Patient on concurrent Herceptin No  Adjuvant hormonal therapy: No Agent  Chemotherapy: Prior to radiation   Intended fractionation schedule standard    Breast cancer risk factors:   No obstetric history on file.  LMP Dates from Last 4 Encounters:   No data found for LMP       We recommend adjuvant radiation as part of her breast conserving therapy to decrease her chance of local recurrence to the single digits. ROM stretches and skin care were discussed with the patient. She understands that these maneuvers need to continue for 6 months following completion of radiation as skin and muscle fibrosis continue to form for weeks to months following completion of therapy.      Side effects that may occur during or within weeks after radiation therapy      Fatigue and general weakness    Darkening, irritation, itchiness, redness, dryness, erythema, peeling, scabbing,  ulceration and contraction of the skin of the breast and chest    Swelling of the breast    Loss of armpit hair    Lung irritation    Decrease in appetite    Side effects that may occur months or years after radiation therapy      Development of another tumor or cancer    Thickening, telangiectasias (development of spider like blood vessels in the skin) and ulceration of the skin of the breast and chest    Firming, fibrosis (scar tissue), fat necrosis, and distortion of the breast    Poor healing after a trauma or surgery in the irradiated area    Nerve damage resulting in loss of arm strength and sensation    Coronary artery blockage causing angina pain or a heart attack    Lung inflammation of fibrosis causing cough, fever and shortness of breath    Fracture of the ribs    Swellingof an arm and hand    The risks, benefits and alternatives to radiation therapy were outlined with the patient. All questions were answered and a consent was signed.      Subjective:      HPI: Willa Light is a 62 y.o. female who was referred for treatment of a new contralateral right breast cancer.     She had left [id number] 1.4cm III LVI neg 0/3SLN Triple neg IDC margin +DCIS TC x4 last 10/16/2014. Re-excision 11/12/2014 neg. Infection Keflex 10 days. The lymphedema to breast and LUE requiring rehab and compression sleeves/bra. Genetic testing negative.She was treated with 3D conformal radiation therapy with breath hold to decrease heart dose 6040cGy  In 33 fractions finishing 2/9/2015. She required systemic steroids prednisone 5 BID due to skin reaction and inability to give topical steroids due to allergy.     Patient doing well until routine bilateral mammogram performed on 6/27/2017 revealed focal asymmetry of the right upper outer breast. The patient could not feel a mass. Needle biopsy from two lesions revealed ER/PA neg (lesion 7 cm from nipple weakly ER positive 1+-2+), HER-2 neg, IDC grade III, clinical stage  T2N1M0.    An MRI of the bilateral breast was obtained on 7/31/2017. She has 3 foci and probably intervening tumor continuing these 3.  Total size of abnormality in the MRI was 4.2 cm.  She has 1 suspicious-appearing node in the right axilla.    Patient began D1C1 neoadjuvant ddAC+T chemotherapy on 8/16/2017. Finished AC on 9/27/2017. Started weekly 12 cycle Taxol on 10/11/2017. Patient only able to receive one dose due to ongoing cough and intermittent fevers , evaluated by pulmonology, and was diagnosed with pneumonitis following bronchoscopy. Pattern of lung injury very typical of cyclophosphamide. Cultures were negative and she responded well to steroids and taper. Continued bactrim. The patient restarted 11/1/2017. Treatments going well, she did have neuropathy which required her to have her treatment held on 12/8/2017 and restarted at 25% reduction on 12/13/2017. She remained at this reduction throughout the rest of her chemotherapy which she finished on 1/17/2018.    MR of bilateral breast w/wo contrast on 1/03/2018 unremarkable, unable to visualize biopsy proven malignancy.     Right breast lumpectomy and right SLN biopsy was performed on 2/12/2018, revealed benign breast tissue, 0/5 lymph nodes pos, no residual disease in breast or axilla. Uneventful post op course.     The patient presents to myself for further evaluation and treatment options. She is otherwise feeling well and has no complaints at this time. Her cough has improved.    Prior Radiation: Yes  Concurrent Chemotherapy: No    Current Outpatient Prescriptions   Medication Sig Dispense Refill     albuterol (PROAIR HFA;PROVENTIL HFA;VENTOLIN HFA) 90 mcg/actuation inhaler Inhale 2 puffs every 4 (four) hours as needed for wheezing.       cetirizine (ZYRTEC) 10 MG tablet Take 10 mg by mouth every evening.        cholecalciferol, vitamin D3, (VITAMIN D3) 1,000 unit capsule Take 1,000 Units by mouth 3 (three) times a week.       fexofenadine (ALLEGRA)  60 MG tablet Take 60 mg by mouth daily as needed.        montelukast (SINGULAIR) 10 mg tablet Take 10 mg by mouth bedtime.       multivitamin therapeutic (THERAGRAN) tablet Take 1 tablet by mouth every evening.        pantoprazole (PROTONIX) 20 MG tablet Take 20 mg by mouth daily.        No current facility-administered medications for this visit.      Facility-Administered Medications Ordered in Other Visits   Medication Dose Route Frequency Provider Last Rate Last Dose     heparin 100 unit/mL lockflush (PF) porcine 600 Units  600 Units Intravenous PRN Moise Dee MD   600 Units at 12/06/17 1121     sodium chloride 0.9 % flush 10 mL (NS)  10 mL Intravenous PRN Moise Dee MD   10 mL at 12/06/17 1121     Past Medical History:   Diagnosis Date     Asthma      Breast cancer 2014    left     Breast cancer 2017    right     Bronchitis, chronic      Colon polyp 2009    one precancerous polyp     Cytoxan-induced pulmonary interstitial disease      Disease of thyroid gland      GERD (gastroesophageal reflux disease)      Peripheral neuropathy     hands and feet     Pneumonia      S/P lumpectomy, left breast 7/16/14     Sinusitis      Uterine polyp      Past Surgical History:   Procedure Laterality Date     BREAST BIOPSY Right 2/12/2018    Procedure: RIGHT BREAST LUMPECTOMY WIRE LOCALIZATION  RIGHT SENTINEL LYMPH NODE BIOPSY AND PORT REMOVAL;  Surgeon: Veronica Gan MD;  [place] [facility] [place]:      BREAST LUMPECTOMY Left 07/2014    left     COLONOSCOPY       FOOT ARTHRODESIS, MODIFIED CHAVEZ  may 2013 and oct 2013    hard to get hardware removed due to allergy to metal      FOOT SURGERY Right 2013     POLYPECTOMY  2009    Uterine polyp removed      port a cath       NV RMVL DAREK CTR VAD W/SUBQ PORT/ CTR/PRPH INSJ N/A 2/12/2018    Procedure: PORT REMOVAL;  Surgeon: Veronica Gan MD;  [place] [facility] [place]: General     Codeine; Cortisone (hydrocortisone) [hydrocortisone];  "Other environmental allergy; Pulmicort [budesonide]; Nickel; Other drug allergy (see comments); Triamcinolone; Zantac [ranitidine hcl]; and Zofran (as hydrochloride) [ondansetron hcl]  Family History   Problem Relation Age of Onset     Ovarian cancer Maternal Grandmother 60     biopsy showed adenocarcinoma, suspected ovarian primary     Colon cancer Maternal Grandfather 65     Breast cancer Cousin 50     maternal first-cousin, triple-negative     Hypertension Mother      Dementia Mother      Diabetes Father      Hypertension Father      Pneumonia Father      Hypertension Sister      Breast cancer Sister 69     Uterine cancer Sister 69     Diabetes Brother      Hypertension Brother      Hypertension Paternal Grandmother      Stroke Paternal Grandmother      Stomach cancer Paternal Grandfather 70     Hypertension Brother      Diabetes Brother      Hypertension Sister      Colon cancer Maternal Aunt 75     Prostate cancer Maternal Uncle      Diabetes Brother      Colon polyps Brother      starting in his 20s     Hypertension Son      Asthma Son      Thyroid cancer Cousin 57     maternal first-cousin, Hurthle cell     Cancer Other      \"female\" cancers among relatives to [facility] but specifics not known     Skin cancer Cousin      paternal first-cousin     Cancer Cousin      paternal first-cousin, unknown type     Uterine cancer Other 50     daughter to paternal first-cousin     Cancer Cousin      paternal first-cousin, head/neck     Social History     Social History     Marital status:      Spouse name: N/A     Number of children: N/A     Years of education: N/A     Occupational History     Not on file.     Social History Main Topics     Smoking status: Never Smoker     Smokeless tobacco: Never Used     Alcohol use No     Drug use: No     Sexual activity: No     Other Topics Concern     Not on file     Social History Narrative        Review of Systems:        General  Constitutional (WDL): Exceptions to WDL  Hot " "flashes/Night Sweats: Mild symptoms, no intervention needed  EENT  Eye Disorder (WDL): Exceptions to WDL (wears glasses, floater in right eye)  Watering Eyes: Intervention not indicated  Ear Disorder (WDL): All ear disorder elements are within defined limits  Respiratory       Respiratory (WDL): Exceptions to WDL (postnasal drip from chemotherapy)  Cardiovascular  Cardiovascular (WDL): All cardiovascular elements are within defined limits  Endocrine     Gastrointestinal  Gastrointestinal (WDL): Exceptions to WDL  Dysgeusia: Altered taste but no change in diet (improving)  Mucositis Oral: Moderate pain: not interfering with oral intake: modified diet (thrush with chemotherapy)  Musculoskeletal  Musculoskeletal and Connetive Tissue Disorders (WDL): All Musculoskeletal and Connetive Tissue Disorder elements are within defined limits  Integumentary               Integumentary (WDL): Exceptions to WDL  Alopecia: Hair loss of >50% normal for that individual that is readily apparent to others, a wig or hair piece is necessary if the patient desires to completely camouflage the hair loss, associated with psychosocial impact  Neurological  Neurosensory (WDL): Exceptions to WDL  Peripheral Sensory Neuropathy: Asymptomatic, loss of deep tendon reflexes or paresthesia (toes & fingertips)  Psychological/Emotional   Patient Coping: Accepting  Hematological/Lymphatic  Lymph (WDL): All lymph disorder elements are within defined limits  Dermatologic     Genitourinary/Reproductive  Genitourinary (WDL): All genitourinary elements are within defined limits  Reproductive     Pain              Currently in Pain: No/denies   AUA Assessment                    Accompanied by  Accompanied by: Alone      Objective:     Physical Exam    Vitals:    03/19/18 1027   BP: 154/87   Pulse: 85   Temp: 99  F (37.2  C)   TempSrc: Oral   SpO2: 98%   Weight: 166 lb 9.6 oz (75.6 kg)   Height: 5' 1\" (1.549 m)       Physical Exam     Head: Normocephalic, " without obvious abnormality, atraumatic  Neck: no adenopathy and supple, symmetrical, trachea midline  Lungs: clear to auscultation bilaterally  Left breast:  expected post operative changes, no masses skin changes suggestive of recurrence or infection. Some breast lymphedema. Radiation sequelae resolved.   Right breast: expected post operative changes, no masses skin changes suggestive of recurrence or infection.  Heart: regular rate and rhythm  Skin: Skin color, texture, turgor normal. No rashes or lesions  Lymph nodes: Cervical, supraclavicular, and axillary nodes normal.  Extremities: No lymphedema, full ROM UE.   Neurologic: Grossly normal  Pysch: affect normal, thought content appropriate.        Recent Labs: No results found for this or any previous visit (from the past 168 hour(s)).    Imaging: Imaging results 30 days: No results found.    Pathology:   Results for orders placed or performed during the hospital encounter of 02/12/18 (from the past 8760 hour(s))   Surgical Pathology Exam   Result Value    Case Report      Surgical Pathology                                Case: [id number]                                    Authorizing Provider:  Veronica Gan MD         Collected:           02/12/2018 0937              Ordering Location:     [facility] OR     Received:            02/12/2018 0955              Pathologist:           Dawit Lane MD                                                         Specimens:   A) - Breast, Lumpectomy, Right                                                                      B) - Lymph Node(s) [place], Breast, Right                                                 Addendum      COMMENT:  Immunostains for calponin and E-cadherin performed on blocks A4 and A12 are positive, highlighting ductal epithelial cell proliferation within areas of proliferative fibrocystic change.    All controls stain appropriately.        Final Diagnosis      A) RIGHT BREAST TISSUE,  EXCISIONAL BIOPSY:       1)  BENIGN BREAST TISSUE WITH BIOPSY SITE CHANGES INCLUDING CHRONIC            INFLAMMATION, FIBROSIS, AND HISTIOCYTIC GIANT CELL PROLIFERATION        2)  NO EVIDENCE OF ATYPIA OR MALIGNANCY       3)  HISTOLOGICALLY UNREMARKABLE SURGICAL MARGINS       4)  NONPROLIFERATIVE FIBROCYSTIC CHANGES        5)  TWO BIOPSY MARKERS PRESENT, CONFIRMED BY X-RAY OF TISSUE BLOCKS     B) RIGHT AXILLARY SENTINEL LYMPH NODE, BIOPSY:       1) FIVE LYMPH NODES; NEGATIVE FOR MALIGNANCY (0/5)       2) IMMUNOSTAINS FOR PANCYTOKERATIN ARE NEGATIVE    Comment      The history of right breast carcinoma is noted (see case [id number], [facility]).    Microscopic Description      Microscopic examination performed, substantiating the above diagnosis. All controls stain appropriately.    All controls stain appropriately.    Clinical Information      Pre-op Diagnosis:  Breast cancer, right [C50.911]   Time Placed in Formalin:  A) 0954   B) 0946    Gross Description      A) Long double stitch is lateral, short double stitch is superior, long single stitch is deep. Submitted in a [place] container, labeled with the patient's name and designated right lumpectomy breast, is a segment of fibrofatty tissue with two localizing needles in place. The specimen is oriented as follows: lateral is long double stitch; superior is short double stitch, deep is long single stitch. The accompanying x-ray does not contain the letters or numbers of the coordinate grid, but the lesion appears to be located in the center of the specimen which has been oriented by the surgeon as noted above. The two localizing needles exit anteriorly according to the surgeon's orientation of the specimen. In addition, two separate biopsy markers are identified radiographically; one has the shape of a coil and the second appears to consist of two juxtaposed metallic clips. The specimen is inked as follows: superior blue, inferior green, anterior yellow,  deep black, medial and lateral red. The  specimen is serially sectioned from medial to lateral to reveal predominantly yellow glistening adipose tissue and ill-defined area of gray-white to pink fibrous connective tissue in the center of the specimen, measuring approximately 1.8 x 1.2 x 1 cm in greatest dimensions.     The biopsy markers are not identified on initial sectioning of the specimen which is entirely submitted as follows: A1) Lateral margin, perpendicular section; A2) Medial margin, perpendicular sections; A3-12) Center of specimen encompassing irregular dense fibrous tissue from lateral to medial; A13-25) Remainder of lateral aspect of specimen (approximately one-third); A24-32) Remainder of medial one-third of specimen.    NOTE:  The presence of two  biopsy markers identified on the current specimen (lumpectomy) radiograph is confirmed in the paraffin blocks, which have also been examined by x-ray.      The specimen is placed in formalin @ 0954 hours on 2/12/2018. BHS:sg   The specimen is removed from  formalin at 2215 hours on 2/12/2018.  Total formalin fixation time: 12 hours 21 minutes  BHS:kiana    B) Received in formalin labeled with the patient's name, Willa Light, and right sentinel lymph node, are two portions of yellow lobulated fat aggregating 3.3 x 2.6 x 1.4 cm. Dissecting through the tissues displays - containing several possible lymph nodes ranging from 0.1 cm to 1.3 cm. The lymph nodes are submitted in their entirety.  RS-5C    SUMMARY OF CASSETTES: B1) One lymph node bisected; B2) One lymph node bisected; B3) One lymph node bisected; D4) One lymph node bisected; B5) Two possible lymph nodes  RJR:sg    Charges CPT: 88307 X2, 67323  ICD-10:  Z85.3    Result Flag      Comment:   SPECIMEN PROCESSING:    All histology slide preparation and stains; and cytology slide preparation, staining, and cytotechnologist screening done at [facility] are performed at [facility],  "[address], [address], with final interpretation, frozen section analysis, and cytology adequacy assessment at indicated laboratory.         Pathology Results     Malignant, Left - 7/16/2014      Pathology Code Malignancy Type    Invasive ductal carcinoma Invasive    Intraductal carcinoma, high grade Non-Invasive          Additional Information            Concordance:  Not Entered Estrogen:  Negative     Progesterone:  Negative    Stage:  1     Histology Grade:  G3 Margin Status:  Involved    HER2/rosa IHC:  0           Removed:  3     Positive:  0          Overall Size:  14 mm     Additional Comments:  [facility] report [id number]. [facility] Dr. Veronica Gan.<BR><BR>Also included pathology data from 6/19/14 \"Histology (Outreach)\" pathology <BR>case. [id number]. [facility]x done at [facility]?                      I, Selena Lin MD personally performed the services described in this documentation, as scribed by Reilly Roth in my presence, and it is both accurate and complete.    Signed by: Selena Lin MD, MPH   "

## 2021-06-16 NOTE — ANESTHESIA PREPROCEDURE EVALUATION
Anesthesia Evaluation      Patient summary reviewed   No history of anesthetic complications     Airway   Mallampati: II  Neck ROM: full   Pulmonary - normal exam   (+) asthma   well controlled,   (-) pneumonia    ROS comment: H/o pneumonitis, now resolved after steroid treatment                         Cardiovascular - normal exam  Exercise tolerance: > or = 4 METS  (+) , hypercholesterolemia,     ECG reviewed        Neuro/Psych    (+) neuromuscular disease (peripheral neuropathy d/t chemotherapy),      Endo/Other    (+) obesity,      Comments: H/o left breast cancer in 2014 s/p chemo/radiation/surgery. Now with right breast surgery s/p chemo/radiation      GI/Hepatic/Renal    (+) GERD well controlled,             Dental - normal exam                        Anesthesia Plan  Planned anesthetic: general LMA and total IV anesthesia    ASA 2   Induction: intravenous   Anesthetic plan and risks discussed with: patient    Post-op plan: routine recovery

## 2021-06-16 NOTE — ANESTHESIA CARE TRANSFER NOTE
Last vitals:   Vitals:    02/12/18 1010   BP: 105/59   Pulse: 77   Resp: 14   Temp: 36.2  C (97.1  F)   SpO2: 100%     Patient's level of consciousness is drowsy  Spontaneous respirations: yes  Maintains airway independently: yes  Dentition unchanged: yes  Oropharynx: oropharynx clear of all foreign objects    QCDR Measures:  ASA# 20 - Surgical Safety Checklist: WHO surgical safety checklist completed prior to induction  PQRS# 430 - Adult PONV Prevention: Pt. Noted to be allergic to zofran and this was omitted.  ASA# 8 - Peds PONV Prevention: NA - Not pediatric patient, not GA or 2 or more risk factors NOT present  PQRS# 424 - Clarita-op Temp Management: 4559F - At least one body temp DOCUMENTED => 35.5C or 95.9F within required timeframe  PQRS# 426 - PACU Transfer Protocol: - Transfer of care checklist used  ASA# 14 - Acute Post-op Pain: ASA14B - Patient did NOT experience pain >= 7 out of 10

## 2021-06-17 NOTE — PROGRESS NOTES
RADIATION ONCOLOGY WEEKLY TREATMENT VISIT NOTE      Assessment / Impression       1. Malignant neoplasm of upper-outer quadrant of right breast in female, estrogen receptor negative       Malignant neoplasm of upper-outer quadrant of right breast in female, estrogen receptor negative    Staging form: Breast, AJCC 7th Edition    - Clinical: Stage IIB (T2(3), N1, M0) - Signed by Zaira Luna CNP on 2017          ER Status: Negative          DE Status: Negative          HER2 Status: Negative    - Pathologic stage from 3/19/2018: yT0, N0, cM0 - Signed by Selena Lin MD on 3/19/2018     Patient describing some fatigue, otherwise tolerating radiation therapy well. All questions and concerns addressed.    Plan:   1. Continue radiation treatment as prescribed.    Radiation: Site: Right Breast  Stereotactic Radiosurgery: No  Stereotactic Radiosurgery: No  Concurrent Therapy: No  Today's Dose: 3880  Total Dose for Breast: 6040  Today's Fraction/Total Fraction Breast:     Subjective:      HPI: Willa Light is a 62 y.o. female with    1. Malignant neoplasm of upper-outer quadrant of right breast in female, estrogen receptor negative         The following portions of the patient's history were reviewed and updated as appropriate: allergies, current medications, past family history, past medical history, past social history, past surgical history and problem list.    Assessment                  Body Site: Breast                           Site: Right Breast  Stereotactic Radiosurgery: No  Concurrent Therapy: No  Today's Dose: 3880  Total Dose for Breast: 6040  Today's Fraction/Total Fraction Breast:   Drainage: 0: Absent                                            Sexuality Alteration                 Emotional Alteration Copin: Effective  Comfort Alteration KPS: 80% Can perform normal activity with effort, some signs of disease  Fatigue (ONS scale) : 7: Extreme  Fatigue  Pain Location: denies  Hot Flashes and/or Flushes: 1: Mild or no more than 1 per day   Nutrition Alteration Anorexia: 0: None  Nausea: 0: None  Vomitin: None  Skin Alteration Skin Sensation: 0: No problem  Skin Reaction: 1: Faint erythema or dry desquamation  AUA Assessment                                  Accompanied by       Objective:     Exam:     Vitals:    18   BP: 136/85   Pulse: 76   Temp: 98  F (36.7  C)   TempSrc: Oral   SpO2: 100%   Weight: 167 lb 3.2 oz (75.8 kg)       Wt Readings from Last 8 Encounters:   18 167 lb 3.2 oz (75.8 kg)   18 167 lb 4.8 oz (75.9 kg)   18 165 lb 9.6 oz (75.1 kg)   18 165 lb 9.6 oz (75.1 kg)   18 166 lb 9.6 oz (75.6 kg)   18 165 lb 8 oz (75.1 kg)   18 163 lb (73.9 kg)   18 167 lb 9.6 oz (76 kg)     General: Alert and oriented, in no acute distress  Willa has mild Erythema.    Treatment Summary to Date    Aria chart and setup information reviewed    I, Selena Lin MD personally performed the services described in this documentation, as scribed by Reilly Roth in my presence, and it is both accurate and complete.    Signed by: Selena Lin MD, MPH

## 2021-06-17 NOTE — PROGRESS NOTES
RADIATION ONCOLOGY WEEKLY TREATMENT VISIT NOTE      Assessment / Impression       1. Malignant neoplasm of upper-outer quadrant of right breast in female, estrogen receptor negative       Malignant neoplasm of upper-outer quadrant of right breast in female, estrogen receptor negative    Staging form: Breast, AJCC 7th Edition    - Clinical: Stage IIB (T2(3), N1, M0) - Signed by Zaira Luna CNP on 2017          ER Status: Negative          [place] Status: Negative          HER2 Status: Negative    - Pathologic stage from 3/19/2018: yT0, N0, cM0 - Signed by Selena Lin MD on 3/19/2018   Tolerating radiation therapy well.  All questions and concerns addressed.      Plan:   2 weeks of prednisone for 2 weeks then 1/2 one week then stop.   Continue radiation treatment as prescribed.  Radiation: Site: Right Breast  Stereotactic Radiosurgery: No  Stereotactic Radiosurgery: No  Concurrent Therapy: No  Today's Dose: 5640  Total Dose for Breast: 6040  Today's Fraction/Total Fraction Breast:       Subjective:      HPI: Willa Light is a 62 y.o. female with    1. Malignant neoplasm of upper-outer quadrant of right breast in female, estrogen receptor negative         The following portions of the patient's history were reviewed and updated as appropriate: allergies, current medications, past family history, past medical history, past social history, past surgical history and problem list.    Assessment                  Body Site: Breast                           Site: Right Breast  Stereotactic Radiosurgery: No  Concurrent Therapy: No  Today's Dose: 5640  Total Dose for Breast: 6040  Today's Fraction/Total Fraction Breast:   Drainage: 0: Absent                                            Sexuality Alteration                 Emotional Alteration Copin: Effective  Comfort Alteration KPS: 90% Can perform normal activity, minor signs of disease  Fatigue (ONS scale) : 2:  Mild Fatigue  Pain Location: right breast tenderness  Pain Intensity. Rate degree of pain ranging from 0 (no pain) to 10 (severe pain) : 0-5  Pain Description: Burning - Burning, hot, fire type pain  Pain Intervention: 1: Over the counter medications  Effectiveness of pain intervention: 4: Pain relieved 100%  Hot Flashes and/or Flushes: 1: Mild or no more than 1 per day   Nutrition Alteration Anorexia: 0: None  Nausea: 0: None  Vomitin: None  Skin Alteration Skin Sensation: 2: Burning  Skin Reaction: 2: Bright erythema  AUA Assessment                                  Accompanied by       Objective:     Exam:     Vitals:    18 0848   BP: 134/85   Pulse: 81   Temp: 97.9  F (36.6  C)   TempSrc: Oral   SpO2: 100%   Weight: 166 lb 12.8 oz (75.7 kg)       Wt Readings from Last 8 Encounters:   18 166 lb 12.8 oz (75.7 kg)   18 167 lb 6.4 oz (75.9 kg)   18 168 lb 1.6 oz (76.2 kg)   18 167 lb 3.2 oz (75.8 kg)   18 167 lb 4.8 oz (75.9 kg)   18 165 lb 9.6 oz (75.1 kg)   18 165 lb 9.6 oz (75.1 kg)   18 166 lb 9.6 oz (75.6 kg)       General: Alert and oriented, in no acute distress  Willa has severe Erythema, no moist desquamation     Treatment Summary to Date    Aria chart and setup information reviewed    Selena Lin MD

## 2021-06-17 NOTE — PROGRESS NOTES
RADIATION ONCOLOGY WEEKLY TREATMENT VISIT NOTE      Assessment / Impression       1. Malignant neoplasm of upper-outer quadrant of right breast in female, estrogen receptor negative       Malignant neoplasm of upper-outer quadrant of right breast in female, estrogen receptor negative    Staging form: Breast, AJCC 7th Edition    - Clinical: Stage IIB (T2(3), N1, M0) - Signed by Zaira Luna CNP on 2017          ER Status: Negative          [place] Status: Negative          HER2 Status: Negative    - Pathologic stage from 3/19/2018: yT0, N0, cM0 - Signed by Selena Lin MD on 3/19/2018   Tolerating radiation therapy well. All questions and concerns addressed.    Plan:   1. Continue radiation treatment as prescribed.  2. Keep area dry and cool, continue to use corn starch.     Radiation: Site: Right Breast  Stereotactic Radiosurgery: No  Stereotactic Radiosurgery: No  Concurrent Therapy: No  Today's Dose: 4680  Total Dose for Breast: 6040  Today's Fraction/Total Fraction Breast:     Subjective:      HPI: Willa Light is a 62 y.o. female with    1. Malignant neoplasm of upper-outer quadrant of right breast in female, estrogen receptor negative       The following portions of the patient's history were reviewed and updated as appropriate: allergies, current medications, past family history, past medical history, past social history, past surgical history and problem list.    Assessment                  Body Site: Breast                           Site: Right Breast  Stereotactic Radiosurgery: No  Concurrent Therapy: No  Today's Dose: 4680  Total Dose for Breast: 6040  Today's Fraction/Total Fraction Breast:   Drainage: 0: Absent                                     Sexuality Alteration Change in sexuality: 0: Absent               Emotional Alteration Copin: Effective  Comfort Alteration KPS: 90% Can perform normal activity, minor signs of disease  Fatigue (ONS  scale) : 2: Mild Fatigue  Pain Location: denies  Hot Flashes and/or Flushes: 1: Mild or no more than 1 per day   Nutrition Alteration Anorexia: 0: None  Nausea: 0: None  Vomitin: None  Skin Alteration Skin Sensation: 1:Pruitis  Skin Reaction: 2: Bright erythema  AUA Assessment                                  Accompanied by       Objective:     Exam:     Vitals:    18 0937   BP: 144/84   Pulse: 89   Temp: 97.9  F (36.6  C)   TempSrc: Oral   SpO2: 97%   Weight: 167 lb 6.4 oz (75.9 kg)       Wt Readings from Last 8 Encounters:   18 167 lb 6.4 oz (75.9 kg)   18 168 lb 1.6 oz (76.2 kg)   18 167 lb 3.2 oz (75.8 kg)   18 167 lb 4.8 oz (75.9 kg)   18 165 lb 9.6 oz (75.1 kg)   18 165 lb 9.6 oz (75.1 kg)   18 166 lb 9.6 oz (75.6 kg)   18 165 lb 8 oz (75.1 kg)       General: Alert and oriented, in no acute distress  Willa has moderate Erythema.    Treatment Summary to Date    Aria chart and setup information reviewed    ISelena MD personally performed the services described in this documentation, as scribed by Reilly Roth in my presence, and it is both accurate and complete.    Signed by: Selena Lin MD, MPH

## 2021-06-17 NOTE — PROGRESS NOTES
RADIATION ONCOLOGY WEEKLY TREATMENT VISIT NOTE      Assessment / Impression       1. Malignant neoplasm of upper-outer quadrant of right breast in female, estrogen receptor negative       Malignant neoplasm of upper-outer quadrant of right breast in female, estrogen receptor negative    Staging form: Breast, AJCC 7th Edition    - Clinical: Stage IIB (T2(3), N1, M0) - Signed by Zaira Luna CNP on 2017          ER Status: Negative          [place] Status: Negative          HER2 Status: Negative    - Pathologic stage from 3/19/2018: yT0, N0, cM0 - Signed by Selena Lin MD on 3/19/2018   Having some discomfort and redness, otherwise tolerating radiation therapy well. All questions and concerns addressed.    Plan:   1. Use Mepilex, avoid antiperspirants and use corn starch instead.  2. Continue radiation treatment as prescribed.    Radiation: Site: Right Breast  Stereotactic Radiosurgery: No  Stereotactic Radiosurgery: No  Concurrent Therapy: No  Today's Dose: 3780  Total Dose for Breast: 6040  Today's Fraction/Total Fraction Breast:     Subjective:      HPI: Willa Light is a 62 y.o. female with    1. Malignant neoplasm of upper-outer quadrant of right breast in female, estrogen receptor negative         The following portions of the patient's history were reviewed and updated as appropriate: allergies, current medications, past family history, past medical history, past social history, past surgical history and problem list.    Assessment                  Body Site: Breast                           Site: Right Breast  Stereotactic Radiosurgery: No  Concurrent Therapy: No  Today's Dose: 3780  Total Dose for Breast: 6040  Today's Fraction/Total Fraction Breast:   Drainage: 0: Absent                                            Sexuality Alteration                 Emotional Alteration Copin: Effective  Comfort Alteration KPS: 90% Can perform normal activity,  minor signs of disease  Fatigue (ONS scale) : 2: Mild Fatigue  Pain Location: denies  Hot Flashes and/or Flushes: 1: Mild or no more than 1 per day   Nutrition Alteration Anorexia: 0: None  Nausea: 0: None  Vomitin: None  Skin Alteration Skin Sensation: 0: No problem  Skin Reaction: 2: Bright erythema (mepilex lite given w/instructions)  AUA Assessment                                  Accompanied by       Objective:     Exam:     Vitals:    18 0913   BP: 136/83   Pulse: 79   Temp: 97.8  F (36.6  C)   TempSrc: Oral   SpO2: 99%   Weight: 168 lb 1.6 oz (76.2 kg)     Wt Readings from Last 8 Encounters:   18 168 lb 1.6 oz (76.2 kg)   18 167 lb 3.2 oz (75.8 kg)   18 167 lb 4.8 oz (75.9 kg)   18 165 lb 9.6 oz (75.1 kg)   18 165 lb 9.6 oz (75.1 kg)   18 166 lb 9.6 oz (75.6 kg)   18 165 lb 8 oz (75.1 kg)   18 163 lb (73.9 kg)       General: Alert and oriented, in no acute distress  Willa has moderate Erythema.    Treatment Summary to Date    Aria chart and setup information reviewed    ISelena MD personally performed the services described in this documentation, as scribed by Reilly Roth in my presence, and it is both accurate and complete.    Signed by: Selena Lin MD, MPH

## 2021-06-17 NOTE — PROGRESS NOTES
Radiation Treatment Summary    Patient: Willa Light   MRN: [id number]  : 1955  Care Provider: Selena Lin  Date of Service: 2018      HISTORY: Willa Light was treated with 3D conformal radiation therapy for new contralateral right breast cancer.      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 Re-excision 2014 neg. Infection Keflex 10 days. The lymphedema to breast and LUE requiring rehab and compression sleeves/bra. Genetic testing negative.She was treated with 3D conformal radiation therapy with breath hold to decrease heart dose 6040cGy  In 33 fractions finishing 2015. She required systemic steroids prednisone 5 BID due to skin reaction and inability to give topical steroids due to allergy.      Patient doing well until routine bilateral mammogram performed on 2017 revealed focal asymmetry of the right upper outer breast. The patient could not feel a mass. Needle biopsy from two lesions revealed ER/UT neg (lesion 7 cm from nipple weakly ER positive 1+-2+), HER-2 neg, IDC grade III, clinical stage T2N1M0.     An MRI of the bilateral breast was obtained on 2017. She has 3 foci and probably intervening tumor continuing these 3.  Total size of abnormality in the MRI was 4.2 cm.  She has 1 suspicious-appearing node in the right axilla.     Patient began D1C1 neoadjuvant ddAC+T chemotherapy on 2017. Finished AC on 2017. Started weekly 12 cycle Taxol on 10/11/2017. Patient only able to receive one dose due to ongoing cough and intermittent fevers , evaluated by pulmonology, and was diagnosed with pneumonitis following bronchoscopy. Pattern of lung injury very typical of cyclophosphamide. Cultures were negative and she responded well to steroids and taper. Continued bactrim. The patient restarted 2017. Treatments going well, she did have neuropathy which required her to have her treatment  held on 12/8/2017 and restarted at 25% reduction on 12/13/2017. She remained at this reduction throughout the rest of her chemotherapy which she finished on 1/17/2018.     MR of bilateral breast w/wo contrast on 1/03/2018 unremarkable, unable to visualize biopsy proven malignancy.      Right breast lumpectomy and right SLN biopsy was performed on 2/12/2018, revealed benign breast tissue, 0/5 lymph nodes pos, no residual disease in breast or axilla. Uneventful post op course.     SITE TREATED: Right Breast  TOTAL DOSE: 6040 cGy  NUMBER OF FRACTIONS: 33  DATES COMPLETED: 3/28/2018-5/11/2018  CONCURRENT CHEMOTHERAPY: No  ADJUVANT THERAPY:No    Willa tolerated the treatment without unexpected side effects.     PRIOR RADIATION:    SITE TREATED: Left breast  TOTAL DOSE: 6040cGy  NUMBER OF FRACTIONS: 33  DATE COMPLETED: 2/9/2015  CONCURRENT CHEMOTHERAPY: No  ADJUVANT THERAPY:No triple neg.     PLAN: Discharge instructions were given and Willa knows to call if questions/issues arise. Willa will be seen in f/u in 4-6 weeks without a scan.       Signed by: Toshia Lopez, RTT

## 2021-06-17 NOTE — PROGRESS NOTES
RADIATION ONCOLOGY WEEKLY TREATMENT VISIT NOTE      Assessment / Impression       1. Malignant neoplasm of upper-outer quadrant of right breast in female, estrogen receptor negative       Malignant neoplasm of upper-outer quadrant of right breast in female, estrogen receptor negative    Staging form: Breast, AJCC 7th Edition    - Clinical: Stage IIB (T2(3), N1, M0) - Signed by Zaira Luna CNP on 2017          ER Status: Negative          [place] Status: Negative          HER2 Status: Negative    - Pathologic stage from 3/19/2018: yT0, N0, cM0 - Signed by Selena Lin MD on 3/19/2018   Tolerating radiation therapy well.  All questions and concerns addressed.    Plan:   1. Continue radiation treatment as prescribed.    Radiation: Site: Right Breast  Stereotactic Radiosurgery: No  Stereotactic Radiosurgery: No  Concurrent Therapy: No  Today's Dose:   Total Dose for Breast: 6040  Today's Fraction/Total Fraction Breast:     Subjective:      HPI: Willa Light is a 62 y.o. female with    1. Malignant neoplasm of upper-outer quadrant of right breast in female, estrogen receptor negative         The following portions of the patient's history were reviewed and updated as appropriate: allergies, current medications, past family history, past medical history, past social history, past surgical history and problem list.    Assessment                  Body Site: Breast                           Site: Right Breast  Stereotactic Radiosurgery: No  Concurrent Therapy: No  Today's Dose:   Total Dose for Breast: 6040  Today's Fraction/Total Fraction Breast:   Drainage: 0: Absent                                            Sexuality Alteration                 Emotional Alteration Copin: Effective  Comfort Alteration KPS: 90% Can perform normal activity, minor signs of disease  Fatigue (ONS scale) : 5: Moderate Fatigue  Pain Location: denies  Hot Flashes and/or  "Flushes: 1: Mild or no more than 1 per day   Nutrition Alteration Anorexia: 0: None  Nausea: 0: None  Vomitin: None  BMI: 31.63  Skin Alteration Skin Sensation: 0: No problem  Skin Reaction: 1: Faint erythema or dry desquamation  AUA Assessment                                  Accompanied by       Objective:     Exam:     Vitals:    18 0851   BP: 133/79   Pulse: 75   Temp: 97.6  F (36.4  C)   TempSrc: Oral   SpO2: 99%   Weight: 167 lb 4.8 oz (75.9 kg)   Height: 5' 1\" (1.549 m)       Wt Readings from Last 8 Encounters:   18 167 lb 4.8 oz (75.9 kg)   18 165 lb 9.6 oz (75.1 kg)   18 165 lb 9.6 oz (75.1 kg)   18 166 lb 9.6 oz (75.6 kg)   18 165 lb 8 oz (75.1 kg)   18 163 lb (73.9 kg)   18 167 lb 9.6 oz (76 kg)   18 168 lb (76.2 kg)       General: Alert and oriented, in no acute distress  Willa has mild Erythema.    Treatment Summary to Date    Aria chart and setup information reviewed    ISelena MD personally performed the services described in this documentation, as scribed by Reilly Roth in my presence, and it is both accurate and complete.    Signed by: Selena Lin MD, MPH   "

## 2021-06-17 NOTE — PROGRESS NOTES
RADIATION ONCOLOGY WEEKLY TREATMENT VISIT NOTE      Assessment / Impression       1. Malignant neoplasm of upper-outer quadrant of right breast in female, estrogen receptor negative       Malignant neoplasm of upper-outer quadrant of right breast in female, estrogen receptor negative    Staging form: Breast, AJCC 7th Edition    - Clinical: Stage IIB (T2(3), N1, M0) - Signed by Zaira Luna CNP on 2017          ER Status: Negative          [place] Status: Negative          HER2 Status: Negative    - Pathologic stage from 3/19/2018: yT0, N0, cM0 - Signed by Selena Lin MD on 3/19/2018     Patient concerned if she is having thrush reaction, otherwise tolerating radiation therapy well.  All questions and concerns addressed.    Plan:   Continue radiation treatment as prescribed.    Radiation: Site: Right Breast  Stereotactic Radiosurgery: No  Stereotactic Radiosurgery: No  Concurrent Therapy: No  Today's Dose: 1080  Total Dose for Breast: 6040  Today's Fraction/Total Fraction Breast:     Subjective:      HPI: Willa Light is a 62 y.o. female with    1. Malignant neoplasm of upper-outer quadrant of right breast in female, estrogen receptor negative         The following portions of the patient's history were reviewed and updated as appropriate: allergies, current medications, past family history, past medical history, past social history, past surgical history and problem list.    Assessment                  Body Site: Breast                           Site: Right Breast  Stereotactic Radiosurgery: No  Concurrent Therapy: No  Today's Dose: 1080  Total Dose for Breast: 6040  Today's Fraction/Total Fraction Breast:   Drainage: 0: Absent                                            Sexuality Alteration                 Emotional Alteration Copin: Effective  Comfort Alteration KPS: 90% Can perform normal activity, minor signs of disease  Fatigue (ONS scale) : 5:  "Moderate Fatigue  Pain Location: denies  Hot Flashes and/or Flushes: 1: Mild or no more than 1 per day   Nutrition Alteration Anorexia: 0: None  Nausea: 0: None  Vomitin: None  BMI: 31.31  Skin Alteration Skin Sensation: 0: No problem  Skin Reaction: 0: None  AUA Assessment                                  Accompanied by       Objective:     Exam:     Vitals:    18 0754   BP: 131/85   Pulse: 88   Temp: 98  F (36.7  C)   TempSrc: Oral   SpO2: 100%   Weight: 165 lb 9.6 oz (75.1 kg)   Height: 5' 1\" (1.549 m)       Wt Readings from Last 8 Encounters:   18 165 lb 9.6 oz (75.1 kg)   18 165 lb 9.6 oz (75.1 kg)   18 166 lb 9.6 oz (75.6 kg)   18 165 lb 8 oz (75.1 kg)   18 163 lb (73.9 kg)   18 167 lb 9.6 oz (76 kg)   18 168 lb (76.2 kg)   18 169 lb 14.4 oz (77.1 kg)       General: Alert and oriented, in no acute distress  Willa has no Erythema.  Posterior oropharynx with signs of infection or thrush.     Treatment Summary to Date    Aria chart and setup information reviewed    ISelena MD personally performed the services described in this documentation, as scribed by Reilly Roth in my presence, and it is both accurate and complete.    Signed by: Selena Lin MD, MPH     "

## 2021-06-18 NOTE — PROGRESS NOTES
[facility] Radiation Oncology Follow Up Note    Patient: Willa Light  MRN: [id number]  Date of Service: 06/12/2018    Assessment / Impression       Malignant neoplasm of upper-outer quadrant of right breast in female, estrogen receptor negative    Staging form: Breast, AJCC 7th Edition    - Clinical: Stage IIB (T2(3), N1, M0) - Signed by Zaira Luna CNP on 8/30/2017          ER Status: Negative          TX Status: Negative          HER2 Status: Negative    - Pathologic stage from 3/19/2018: yT0, N0, cM0 - Signed by Selena Lin MD on 3/19/2018  ECOG Peformance Status  ECOG Performance Status: 0  Distress Assessment Score  Distress Assessment Score: 3  Interventions  Distress Assessment Intervention: Provider Notified  Body site: Breast     Plan:   1. Continue upper extremity ROMs for the next 5-6 months.  2. Follow up with myself as needed.    The patient understands she may contact us at anytime with questions or concerns. Continue to see medical oncology.    Face to face time  25 minutes with > 75% spent on consultation, education and coordination of care.    Subjective:      HPI: Willa Light is a 63 y.o. female who was treated with 3D conformal radiation therapy for new contralateral right breast cancer.       She had left [id number] 1.4cm III LVI neg 0/3SLN Triple neg IDC margin +DCIS TC x4 last 10/16/2014. Re-excision 11/12/2014 neg. Infection Keflex 10 days. The lymphedema to breast and LUE requiring rehab and compression sleeves/bra. Genetic testing negative.She was treated with 3D conformal radiation therapy with breath hold to decrease heart dose 6040cGy  In 33 fractions finishing 2/9/2015. She required systemic steroids prednisone 5 BID due to skin reaction and inability to give topical steroids due to allergy.       Patient doing well until routine bilateral mammogram performed on 6/27/2017 revealed focal asymmetry of the right upper outer breast. The patient  could not feel a mass. Needle biopsy from two lesions revealed ER/HI neg (lesion 7 cm from nipple weakly ER positive 1+-2+), HER-2 neg, IDC grade III, clinical stage T2N1M0.      An MRI of the bilateral breast was obtained on 7/31/2017. She has 3 foci and probably intervening tumor continuing these 3.  Total size of abnormality in the MRI was 4.2 cm.  She has 1 suspicious-appearing node in the right axilla.      Patient began D1C1 neoadjuvant ddAC+T chemotherapy on 8/16/2017. Finished AC on 9/27/2017. Started weekly 12 cycle Taxol on 10/11/2017. Patient only able to receive one dose due to ongoing cough and intermittent fevers , evaluated by pulmonology, and was diagnosed with pneumonitis following bronchoscopy. Pattern of lung injury very typical of cyclophosphamide. Cultures were negative and she responded well to steroids and taper. Continued bactrim. The patient restarted 11/1/2017. Treatments going well, she did have neuropathy which required her to have her treatment held on 12/8/2017 and restarted at 25% reduction on 12/13/2017. She remained at this reduction throughout the rest of her chemotherapy which she finished on 1/17/2018.      MR of bilateral breast w/wo contrast on 1/03/2018 unremarkable, unable to visualize biopsy proven malignancy.       Right breast lumpectomy and right SLN biopsy was performed on 2/12/2018, revealed benign breast tissue, 0/5 lymph nodes pos, no residual disease in breast or axilla. Uneventful post op course.      SITE TREATED: Right Breast  TOTAL DOSE: 6040 cGy  NUMBER OF FRACTIONS: 33  DATES COMPLETED: 3/28/2018-5/11/2018  CONCURRENT CHEMOTHERAPY: No  ADJUVANT THERAPY:No     Willa tolerated the treatment without unexpected side effects.      PRIOR RADIATION:     SITE TREATED: Left breast  TOTAL DOSE: 6040cGy  NUMBER OF FRACTIONS: 33  DATE COMPLETED: 2/9/2015  CONCURRENT CHEMOTHERAPY: No  ADJUVANT THERAPY:No triple neg.     She presents today for routine office follow up. Has  noticed some arthritic pain with increased gardening lately. Otherwise the patient is feeling well. She finished her prednisone taper approximately 1.5 weeks ago.     Current Outpatient Prescriptions   Medication Sig Dispense Refill     albuterol (PROAIR HFA;PROVENTIL HFA;VENTOLIN HFA) 90 mcg/actuation inhaler Inhale 2 puffs every 4 (four) hours as needed for wheezing.       cetirizine (ZYRTEC) 10 MG tablet Take 10 mg by mouth every evening.        fexofenadine (ALLEGRA) 60 MG tablet Take 60 mg by mouth daily as needed.        montelukast (SINGULAIR) 10 mg tablet Take 10 mg by mouth bedtime.       multivitamin therapeutic (THERAGRAN) tablet Take 1 tablet by mouth every evening.        pantoprazole (PROTONIX) 20 MG tablet TAKE 1 TABLET BY MOUTH DAILY 90 tablet 0     No current facility-administered medications for this visit.      Facility-Administered Medications Ordered in Other Visits   Medication Dose Route Frequency Provider Last Rate Last Dose     heparin 100 unit/mL lockflush (PF) porcine 600 Units  600 Units Intravenous PRN Moise Dee MD   600 Units at 12/06/17 1121     sodium chloride 0.9 % flush 10 mL (NS)  10 mL Intravenous PRN Moise Dee MD   10 mL at 12/06/17 1121       The following portions of the patient's history were reviewed and updated as appropriate: allergies, current medications, past family history, past medical history, past social history, past surgical history and problem list.    Review of Systems    General  Constitutional (WDL): Exceptions to WDL  Fatigue: Fatigue relieved by rest  Hot flashes/Night Sweats: Mild symptoms, no intervention needed  EENT  Eye Disorder (WDL): Exceptions to WDL (one floater)  Watering Eyes: Intervention not indicated  Ear Disorder (WDL): Exceptions to WDL  Tinnitus: Mild symptoms, intervention not indicated (chronic)  Respiratory       Respiratory (WDL): Within Defined Limits  Cardiovascular  Cardiovascular (WDL): All cardiovascular elements are  within defined limits  Endocrine     Gastrointestinal  Gastrointestinal (WDL): All gastrointestinal elements are within defined limits  Musculoskeletal  Musculoskeletal and Connetive Tissue Disorders (WDL): Exceptions to WDL  Arthralgia: Mild pain (hands)  Integumentary               Integumentary (WDL): Exceptions to WDL  Alopecia: Hair loss of up to 50% of normal for that individual that is not obvious from a distance but only on close inspection, a different hair style may be required to cover the hair loss but it does not require a wig or hair piece to camouflage  Neurological  Neurosensory (WDL): Exceptions to WDL  Peripheral Sensory Neuropathy: Asymptomatic, loss of deep tendon reflexes or paresthesia (fingers and little toes)  Psychological/Emotional   Patient Coping: Accepting  Hematological/Lymphatic  Lymph (WDL): All lymph disorder elements are within defined limits  Dermatologic     Genitourinary/Reproductive  Genitourinary (WDL): All genitourinary elements are within defined limits  Reproductive     Pain              Currently in Pain: No/denies  AUA Assessment                                  Accompanied by  Accompanied by: Alone      Objective:     Physical Exam    Vitals:    06/12/18 0850   BP: 135/83   Pulse: 86   Temp: 98  F (36.7  C)   TempSrc: Oral   SpO2: 99%   Weight: 168 lb 6.4 oz (76.4 kg)        Head: Normocephalic, without obvious abnormality, atraumatic  Neck: no adenopathy and supple, symmetrical, trachea midline  Lungs: Normal respiratory effort.   Breasts:  expected post operative changes, no masses skin changes suggestive of recurrence or infection.   Heart: regular rate and rhythm  Skin: Skin color, texture, turgor normal. No rashes or lesions  Lymph nodes: Cervical, supraclavicular, and axillary nodes normal.  Extremities: No lymphedema, full ROM UE.   Neurologic: Grossly normal  Pysch: affect normal, thought content appropriate.     Recent Labs: No results found for this or any  previous visit (from the past 168 hour(s)).    Imaging: Imaging results 30 days: No results found.    I, Sleena Lin MD personally performed the services described in this documentation, as scribed by Reilly Roth in my presence, and it is both accurate and complete.    Signed by: Selena Lin MD, MPH

## 2021-06-18 NOTE — PROGRESS NOTES
Pt ambulatory to radiation clinic for follow up. VSS, denies pain. Further recommendations and orders per provider.

## 2021-06-18 NOTE — PROGRESS NOTES
Patient here ambulatory for final radiation treatment.  Bright erythema and dry desquamation present in treatment area.  Written discharge instructions reviewed and given to patient.  Follow up in about 4 to 6 weeks with Dr. Lin.  Patient able to verbalize understanding and left ambulatory with copy of appointments.

## 2021-06-19 NOTE — PROGRESS NOTES
[facility] Hematology and Oncology Progress Note    Patient: Willa Light  MRN: [id number]  Date of Service:  July 17, 2018      Assessment and Plan:    Breast cancer    Primary site: Breast (Left)    Staging method: AJCC 7th Edition    Clinical: Stage IA (T1c, N0, cM0) - Signed by Moise Dee MD on 8/13/2014    Pathologic free text: [id number]    Summary: Stage IA (T1c, N0, cM0)    1. Breast cancer: She completed radiation about 2 months ago.  Today she is feeling well.  Getting over all the side effects of treatment.  No acute complaints.  We will see her back in clinic every 4 months.  She just had a mammogram yesterday which was normal.  Next in 1 year.  She seen Dr. Gan in a few weeks.    2.  Chemotherapy-induced peripheral neuropathy: Continues to improve.  Remains grade 1.  She states her fingers are 80-90% back to normal.    ECOG Performance   ECOG Performance Status: 0    Distress Assessment  Distress Assessment Score: No distress    Pain  Currently in Pain: No/denies    Diagnosis:    1. Invasive ductal carcinoma of the left breast:  Triple negative. Stage T6vT5Xb. Diagnosed July 2014. She had a positive margin at initial surgery. BRCA-.    2.  Invasive ductal carcinoma of the right breast: Triple negative.  Grade 3.  Clinical stage T2N1M0.  Diagnosed July 2017.  MRI shows 3 foci and probable intervening tumor between these 3 foci.  Total size of abnormality in the MRI was 4.2 cm.  One suspicious-appearing node in the axilla.    3.  Pneumonitis: CT on October 11 shows diffuse bilateral mixed airspace and interstitial infiltrates.  Possibly due to chemotherapy.  Bronchoscopy was done on October 20.  Cultures negative.  She seemed to respond to steroids.    Treatment:    She had a lumpectomy followed by 4 cycles of Taxotere and Cytoxan. This was completed in October 2014. She then had a reresection for the positive margin. She then had adjuvant radiation completed in February 2015.     She  was treated with neoadjuvant Adriamycin and Cytoxan for her second, most recent diagnosis.  Chemotherapy started on August 16, 2017.  Cycle 4 given on September 27.  Weekly Taxol started October 11.  Taxol reduced 25% for neuropathy.  Last was completed on January 17, 2018    Right-sided lumpectomy performed in February 12, 2018.  Pathology showed complete response.  No residual disease.  Adjuvant radiation completed on May 11, 2018    Interim History:    Willa is here for follow-up visit.  We last saw her 4 months ago.  In the interim she completed radiation therapy.  She did well.  She was on a low-dose steroid taper for her history of pneumonitis.  She did not have any further respiratory complications.  She has been off prednisone for about a month.  Now just feeling back to normal after the all this treatment.  She notes that her peripheral neuropathy is improved.  Almost completely resolved in the hands.  No cough or shortness of breath.  No chest pain.    Review of Systems:    Constitutional  Constitutional (WDL): Exceptions to WDL  Neurosensory  Neurosensory (WDL): Exceptions to WDL  Peripheral Sensory Neuropathy: Asymptomatic, loss of deep tendon reflexes or paresthesia (very slightly in fingers; left toe worse than right toe)  Cardiovascular  Cardiovascular (WDL): All cardiovascular elements are within defined limits  Pulmonary  Respiratory (WDL): Within Defined Limits  Cough: Mild symptoms, nonprescription intervention indicated (sinus related when she does have one)  Gastrointestinal  Gastrointestinal (WDL): Exceptions to WDL (area on right side of tongue - white bump)  Genitourinary  Genitourinary (WDL): All genitourinary elements are within defined limits  Integumentary  Integumentary (WDL): All integumentary elements are within defined limits  Patient Coping  Patient Coping: Accepting  Accompanied by  Accompanied by: Alone    Past History:    Past Medical History:   Diagnosis Date     Asthma       Breast cancer (H) 2014    left     Breast cancer (H) 2017    right     Bronchitis, chronic (H)      Colon polyp 2009    one precancerous polyp     Cytoxan-induced pulmonary interstitial disease (H)      Disease of thyroid gland      GERD (gastroesophageal reflux disease)      Hx antineoplastic chemotherapy 2014     Hx antineoplastic chemotherapy 2017     Hx of radiation therapy 2014     Hx of radiation therapy 2017     Peripheral neuropathy (H)     hands and feet     Pneumonia      S/P lumpectomy, left breast 7/16/14     Sinusitis      Uterine polyp      Physical Exam:    Recent Vitals 7/17/2018   Weight 169 lbs 8 oz   /79   Pulse 94   Temp 98.4   Temp src 1   SpO2 98   Some recent data might be hidden     General: patient appears stated age of 63 y.o.. Nontoxic and in no distress.   HEENT: Head: atraumatic, normocephalic. Sclerae anicteric.  Oropharynx clear.  Mucous membranes moist.  Small white papillary lesion on the lateral right tongue.  Chest:  Normal respiratory effort.  Clear to auscultation bilaterally  Cardiac:  No edema.  Regular rate and rhythm.  No murmur appreciated.  Abdomen: abdomen is non-distended  Extremities: normal tone and muscle bulk.  Skin: no lesions or rash. Warm and dry.   CNS: alert and oriented x3. Grossly non-focal.   Psychiatric: normal mood and affect.     Lab Results:    No results found for this or any previous visit (from the past 168 hour(s)).     Imaging:    Mammo Diagnostic Bilateral    Result Date: 7/16/2018  MAMMO DIAGNOSTIC 3D BILATERAL 7/16/2018 10:14 AM INDICATION: Follow-up breast cancer in patient who is status post bilateral breast conservation surgery for bilateral breast cancers left in 2014 and right in 2018. COMPARISON: 06/22/2015, 06/27/2017, 07/05/2017. MAMMOGRAPHIC FINDINGS: Bilateral full-field digital diagnostic mammograms performed. Breast tissue is heterogeneously dense, which may obscure small masses. There are postlumpectomy changes in both breasts.  There is no mammographic evidence for new malignancy. Images evaluated with the assistance of CAD. Breast tomosynthesis was used in interpretation.     Bilateral post lumpectomy change. No mammographic evidence for malignancy. ACR BI-RADS Category 2: Benign. Results given to the patient who should resume routine screening mammography.      Signed by: Moise Dee MD

## 2021-06-19 NOTE — PROGRESS NOTES
This is a 63 y.o. woman who comes in for  continued follow-up of her bilateral breast cancer.  She is now 4 years  status post left partial mastectomy with radiation. And 18 months status post right lumpectomy with radiation.  She has done 2 rounds of chemotherapy.  She is feeling well.  She has no new complaints today.      Please see the chart review for PMH, Meds, allergies, FH and SH.    ROS:  A 12 point comprehensive review of systems was negative except as noted.      Physical Exam:  /80 (Patient Site: Right Arm, Patient Position: Sitting, Cuff Size: Adult Regular)  Pulse (!) 104  Wt 169 lb (76.7 kg)  SpO2 97%  Breastfeeding? No  BMI 31.93 kg/m2  General appearance: alert, appears stated age and cooperative  Breasts: There are no palpable masses. There are moderate radiation changes. The scar has healed up well.  He has moderate lymphedema on the left.  Just mild lymphedema on the right.  Lymph nodes: Cervical, supraclavicular, and axillary nodes normal.  Neurologic: Grossly normal    Data Review: Reviewed her current mammograms. No evidence of disease.      Impression: Personal History of breast cancer. NOD.  Overall doing well.  Because of her high risk situation with developing bilateral aggressive breast cancers I want to keep a closer eye on her than typical.    Plan: Follow up in 6 months.

## 2021-06-19 NOTE — PROGRESS NOTES
I was able to meet Willa today to present her SCP. I reviewed the Treatment Summary in its' entirety. I then reviewed the survivorship resources contained in the plan as well as the adv dir and the witnessed signature. I asked if she could review the contents at some point as I would like to make a follow up call in a wk or so looking for feedback. She agreed. I congratulated her on her survivorship and thanked her for her time. Lacho

## 2021-06-19 NOTE — PROGRESS NOTES
Chief Complaint   Patient presents with     Follow-up     atient in for 6 month follow up patient states she was doing good till pneumonia shot

## 2021-06-21 NOTE — ANESTHESIA POSTPROCEDURE EVALUATION
Patient: Willa Light  TOTAL LAPAROSCOPIC HYSTERECTOMY, BILATERAL SALPINGO-OOPHORECTOMY  Anesthesia type: general    Patient location: PACU  Last vitals:   Vitals:    11/14/18 1615   BP: 131/75   Pulse: 68   Resp: 18   Temp: 36.6  C (97.9  F)   SpO2: 98%     Post vital signs: stable  Level of consciousness: awake and responds to simple questions  Post-anesthesia pain: pain controlled  Post-anesthesia nausea and vomiting: no  Pulmonary: unassisted, return to baseline  Cardiovascular: stable and blood pressure at baseline  Hydration: adequate  Anesthetic events: yes: corneal injury    QCDR Measures:  ASA# 11 - [place]-op Cardiac Arrest: ASA11B - Patient did NOT experience unanticipated cardiac arrest  ASA# 12 - [place]-op Mortality Rate: ASA12B - Patient did NOT die  ASA# 13 - PACU Re-Intubation Rate: ASA13B - Patient did NOT require a new airway mgmt  ASA# 10 - Composite Anes Safety: ASA10A - No serious adverse event    Additional Notes:    Possible R corneal abrasion. Ordered saline eye drops and erythromycin ointment and instructed that we would follow up tomorrow.

## 2021-06-21 NOTE — ANESTHESIA PROCEDURE NOTES
Peripheral Block    Patient location during procedure: pre-op  Start time: 11/14/2018 10:19 AM  End time: 11/14/2018 10:23 AM  post-op analgesia per surgeon order as noted in medical record  Staffing:  Performing  Anesthesiologist: Ac Carvalho MD  Preanesthetic Checklist  Completed: patient identified, site marked, risks, benefits, and alternatives discussed, timeout performed, consent obtained, airway assessed, oxygen available, suction available, emergency drugs available and hand hygiene performed  Peripheral Block  Block type: other, TAP  Prep: ChloraPrep  Patient position: supine  Patient monitoring: cardiac monitor, continuous pulse oximetry, blood pressure and heart rate  Laterality: bilateral, same technique used bilaterally  Injection technique: ultrasound guided    Ultrasound used to visualize needle placement in proximity to nerve being blocked: yes   Permanent ultrasound image captured for medical record  Sterile gel and probe cover used for ultrasound.    Needle  Needle type: echogenic   Needle gauge: 20G  Needle length: 4 in  no peripheral nerve catheter placed  Assessment  Injection assessment: no difficulty with injection, negative aspiration for heme, no paresthesia on injection and incremental injection

## 2021-06-21 NOTE — ANESTHESIA CARE TRANSFER NOTE
Last vitals:   Vitals:    11/14/18 1037   BP: 144/82   Pulse: 81   Resp: 20   Temp: 37.6  C (99.7  F)   SpO2: 100%     Patient's level of consciousness is drowsy  Spontaneous respirations: yes  Maintains airway independently: yes  Dentition unchanged: yes  Oropharynx: oropharynx clear of all foreign objects    QCDR Measures:  ASA# 20 - Surgical Safety Checklist: WHO surgical safety checklist completed prior to induction    PQRS# 430 - Adult PONV Prevention: 4558F - Pt received => 2 anti-emetic agents (different classes) preop & intraop  ASA# 8 - Peds PONV Prevention: NA - Not pediatric patient, not GA or 2 or more risk factors NOT present  PQRS# 424 - Clarita-op Temp Management: 4559F - At least one body temp DOCUMENTED => 35.5C or 95.9F within required timeframe  PQRS# 426 - PACU Transfer Protocol: - Transfer of care checklist used  ASA# 14 - Acute Post-op Pain: ASA14B - Patient did NOT experience pain >= 7 out of 10

## 2021-06-21 NOTE — ANESTHESIA PREPROCEDURE EVALUATION
Anesthesia Evaluation      Patient summary reviewed   No history of anesthetic complications     Airway   Mallampati: II  Neck ROM: full   Pulmonary     breath sounds clear to auscultation  (+) asthma  mild,   (-) COPD, sleep apnea, rhonchi, wheezes, rales, stridor, not a smoker                         Cardiovascular   Exercise tolerance: > or = 4 METS  (-) hypertension, past MI, CAD, murmur  Rhythm: regular  Rate: normal,    no murmur      Neuro/Psych    (+) neuromuscular disease (peripheral neuropathy),    (-) no seizures, no CVA    Endo/Other    (+) obesity (BMI 30.63),   (-) no diabetes, hypothyroidism     GI/Hepatic/Renal    (+) GERD,     (-) impaired hepatic function, renal disease     Other findings: Labs 11/12/18:  Na 142, K 4.8, BUN 22, Cr 0.83  Hgb 13.4, Plt 261      Dental - normal exam     Comment: No loose, chipped, partial, removable or dentures.                         Anesthesia Plan  Planned anesthetic: general endotracheal and peripheral nerve block  GETA.  High risk for PONV and plan for scopolamine patch, dexamethasone, zofran and low-dose propofol gtt (25-50 mcg/kg/min).  Ketamine 30 mg post-induction for opioid sparring.  Will discuss potential bilateral TAP blocks for postoperative analgesia per surgeon approval.    ASA 2   Induction: intravenous   Anesthetic plan and risks discussed with: patient and spouse  Anesthesia plan special considerations: antiemetics,   Post-op plan: routine recovery

## 2021-06-22 NOTE — PROGRESS NOTES
[facility] Hematology and Oncology Progress Note    Patient: Willa Light  MRN: [id number]  Date of Service: 12/05/18          Reason for Visit    Chief Complaint   Patient presents with     HE Cancer     Breast Cancer       Assessment and Plan  Cancer Staging  Malignant neoplasm of upper-outer quadrant of right breast in female, estrogen receptor negative (H)  Staging form: Breast, AJCC 7th Edition  - Clinical: Stage IIB (T2(3), N1, M0) - Signed by Zaira Luan CNP on 8/30/2017  ER Status: Negative  OR Status: Negative  HER2 Status: Negative  - Pathologic stage from 3/19/2018: yT0, N0, cM0 - Signed by Selena Lin MD on 3/19/2018      1. Breast cancer: hx of breast cancer of left breast in 2014, the in July 2017 found to have right sided breast cancer, clinically stage 2.  Induced chemo and radiation and surgery around April May 2018.  She is been doing fine since then.  Her mammogram in July was normal.  No clinical evidence of recurrence today.  She will return in 4 months.      ECOG Performance   ECOG Performance Status: 0     Distress Assessment  Distress Assessment Score: 4:     Pain  Currently in Pain: No/denies      Problem List    1. Malignant neoplasm of upper-outer quadrant of right breast in female, estrogen receptor negative (H)       ______________________________________________________________________________    History of Present Illness    Diagnosis:     1. Invasive ductal carcinoma of the left breast:  Triple negative. Stage T5uL0Du. Diagnosed July 2014. She had a positive margin at initial surgery. BRCA-.     2.  Invasive ductal carcinoma of the right breast: Triple negative.  Grade 3.  Clinical stage T2N1M0.  Diagnosed July 2017.  MRI shows 3 foci and probable intervening tumor between these 3 foci.  Total size of abnormality in the MRI was 4.2 cm.  One suspicious-appearing node in the axilla.     3.  Pneumonitis: CT on October 11 shows diffuse bilateral mixed  airspace and interstitial infiltrates.  Possibly due to chemotherapy.  Bronchoscopy was done on October 20.  Cultures negative.  She seemed to respond to steroids.     Treatment:     She had a lumpectomy followed by 4 cycles of Taxotere and Cytoxan. This was completed in October 2014. She then had a reresection for the positive margin. She then had adjuvant radiation completed in February 2015.      She was treated with neoadjuvant Adriamycin and Cytoxan for her second, most recent diagnosis.  Chemotherapy started on August 16, 2017.  Cycle 4 given on September 27.  Weekly Taxol started October 11.  Taxol reduced 25% for neuropathy.  Last was completed on January 17, 2018     Right-sided lumpectomy performed in February 12, 2018.  Pathology showed complete response.  No residual disease.  Adjuvant radiation completed on May 11, 2018     Interim History:     Willa is here for follow-up visit.  We last saw her 4 months ago.  Patient underwent a vaginal hysterectomy with bilateral salpingo-oophorectomy November 14, 2018.  She states that she is doing well and healing well from that.  Otherwise she feels healthy.  She does continue to have very mild neuropathy in her fingertips and the balls of her feet.  Not affecting ADLs.  No changes in her breast      Pain Status  Currently in Pain: No/denies    Review of Systems    Constitutional  Constitutional (WDL): Exceptions to WDL  Weight Loss: to <10% from baseline, intervention not indicated(6#)  Neurosensory  Neurosensory (WDL): Exceptions to WDL  Peripheral Sensory Neuropathy: Asymptomatic, loss of deep tendon reflexes or paresthesia(fingertips, feet)  Eye   Eye Disorder (WDL): All eye disorder elements are within defined limits  Ear  Ear Disorder (WDL): All ear disorder elements are within defined limits  Cardiovascular  Cardiovascular (WDL): All cardiovascular elements are within defined limits  Pulmonary  Respiratory (WDL): Within Defined  Limits  Gastrointestinal  Gastrointestinal (WDL): Exceptions to WDL  Diarrhea: Increase of <4 stools per day over baseline, mild increase in ostomy output compared to baseline  Nausea: Loss of appetite without alteration in eating habits(sour stomach since recent surgery)  Genitourinary  Genitourinary (WDL): All genitourinary elements are within defined limits  Lymphatic  Lymph (WDL): All lymph disorder elements are within defined limits  Musculoskeletal and Connective Tissue  Musculoskeletal and Connetive Tissue Disorders (WDL): Exceptions to WDL  Arthralgia: Mild pain(feet and hands)  Integumentary  Integumentary (WDL): All integumentary elements are within defined limits  Patient Coping  Patient Coping: Accepting  Distress Assessment  Distress Assessment Score: 4  Accompanied by  Accompanied by: Alone  Oral Chemo Adherence       Past History  Past Medical History:   Diagnosis Date     Asthma      Breast cancer (H) 2014    left     Breast cancer (H) 2017    right     Bronchitis, chronic (H)      Colon polyp 2009    one precancerous polyp     Cytoxan-induced pulmonary interstitial disease (H)      Disease of thyroid gland      GERD (gastroesophageal reflux disease)      Hx antineoplastic chemotherapy 2014     Hx of radiation therapy 2014     Peripheral neuropathy     hands and feet     Pneumonia      Sinusitis      Uterine polyp        PHYSICAL EXAM:  /83   Pulse 87   Temp 98.1  F (36.7  C) (Oral)   Wt 163 lb 3.2 oz (74 kg)   SpO2 96%   BMI 30.84 kg/m    GENERAL: no acute distress. Cooperative in conversation. Here alone  HEENT: pupils are equal, round and reactive. Oromucosa is clean and intact. No ulcerations or mucositis noted. No bleeding noted. Pt has oral thrush noted  RESP: lungs are clear bilaterally per auscultation. Regular respiratory rate. No wheezes or rhonchi.  CV: Regular, rate and rhythm. No murmurs.  ABD: soft, nontender. Positive bowel sounds. No organomegaly.   MUSCULOSKELETAL: No  lower extremity swelling.   NEURO: non focal. Alert and oriented x3.   PSYCH: within normal limits. No depression or anxiety.  SKIN: warm dry intact, port is CDI in left chest wall.   LYMPH: no cervical, supraclavicular or axillary lymphadenopathy  BREAST: Bilateral exam done.  Bilateral lumpectomy incisions are well-healed.  He has scarring in both breasts as well as radiation changes.  Her left breast seems to be a little more dense than the right breast.  No abnormal lesions or rashes noted.  No evidence for local recurrence bilaterally.          Lab Results    Recent Results (from the past 168 hour(s))   Comprehensive Metabolic Panel   Result Value Ref Range    Sodium 142 136 - 145 mmol/L    Potassium 4.7 3.5 - 5.0 mmol/L    Chloride 105 98 - 107 mmol/L    CO2 27 22 - 31 mmol/L    Anion Gap, Calculation 10 5 - 18 mmol/L    Glucose 95 70 - 125 mg/dL    BUN 15 8 - 22 mg/dL    Creatinine 0.83 0.60 - 1.10 mg/dL    GFR MDRD Af Amer >60 >60 mL/min/1.73m2    GFR MDRD Non Af Amer >60 >60 mL/min/1.73m2    Bilirubin, Total 0.3 0.0 - 1.0 mg/dL    Calcium 9.6 8.5 - 10.5 mg/dL    Protein, Total 7.1 6.0 - 8.0 g/dL    Albumin 3.3 (L) 3.5 - 5.0 g/dL    Alkaline Phosphatase 113 45 - 120 U/L    AST 23 0 - 40 U/L    ALT <9 0 - 45 U/L   HM1 (CBC with Diff)   Result Value Ref Range    WBC 6.4 4.0 - 11.0 thou/uL    RBC 4.87 3.80 - 5.40 mill/uL    Hemoglobin 13.5 12.0 - 16.0 g/dL    Hematocrit 42.0 35.0 - 47.0 %    MCV 86 80 - 100 fL    MCH 27.7 27.0 - 34.0 pg    MCHC 32.1 32.0 - 36.0 g/dL    RDW 14.9 (H) 11.0 - 14.5 %    Platelets 251 140 - 440 thou/uL    MPV 9.7 8.5 - 12.5 fL    Neutrophils % 75 (H) 50 - 70 %    Lymphocytes % 12 (L) 20 - 40 %    Monocytes % 9 2 - 10 %    Eosinophils % 4 0 - 6 %    Basophils % 0 0 - 2 %    Neutrophils Absolute 4.8 2.0 - 7.7 thou/uL    Lymphocytes Absolute 0.7 (L) 0.8 - 4.4 thou/uL    Monocytes Absolute 0.6 0.0 - 0.9 thou/uL    Eosinophils Absolute 0.2 0.0 - 0.4 thou/uL    Basophils Absolute 0.0 0.0 -  0.2 thou/uL       Imaging    No results found.      Signed by: Zaira Luna, CNP

## 2021-06-22 NOTE — PROGRESS NOTES
Chief Complaint   Patient presents with     Follow Up     5 and half month recheck patient states she has had changes taken out patient had hysterectomy

## 2021-06-22 NOTE — PROGRESS NOTES
"This is a 63 y.o. woman who comes in for  continued follow-up of her bilateral breast cancer.  She is now 4 1/2  years  status post left partial mastectomy and 2 years s/p right partial mastectomy with radiation.  She had chemotherapy both times.  She is feeling well.  She has no new complaints today.      Please see the chart review for PMH, Meds, allergies, FH and SH.    ROS:  A 12 point comprehensive review of systems was negative except as noted.      Physical Exam:  /60 (Patient Site: Right Arm, Patient Position: Sitting, Cuff Size: Adult Large)   Pulse 88   Ht 5' 1\" (1.549 m)   Wt 163 lb (73.9 kg)   SpO2 97%   Breastfeeding? No   BMI 30.80 kg/m    General appearance: alert, appears stated age and cooperative  Breasts: There are no palpable masses. There are moderate radiation changes. The scar has healed up well.  She has lymphedema changes in both breasts.  Lymph nodes: Cervical, supraclavicular, and axillary nodes normal.  Neurologic: Grossly normal          Impression: Personal History of breast cancer. NOD.  Is overall doing very well.  Because she had bilateral aggressive cancers I am keeping a close eye on her.    Plan: Follow up in 6-7 months after her next mammogram.    "

## 2021-06-26 ENCOUNTER — HEALTH MAINTENANCE LETTER (OUTPATIENT)
Age: 66
End: 2021-06-26

## 2021-07-03 NOTE — ADDENDUM NOTE
Addendum Note by Arabella Lira CMA at 7/24/2018  1:43 PM     Author: Arabella Lira CMA Service: -- Author Type: Certified Medical Assistant    Filed: 7/24/2018  1:43 PM Date of Service: 7/24/2018  1:43 PM Status: Signed    : Arabella Lira CMA (Certified Medical Assistant)    Encounter addended by: Arabella Lira CMA on: 7/24/2018  1:43 PM<BR>     Actions taken: Charge Capture section accepted, Sign clinical note

## 2021-07-03 NOTE — ADDENDUM NOTE
Addendum Note by Marysol Richardson RN at 7/24/2018  2:00 PM     Author: Marysol Richardson RN Service: -- Author Type: Registered Nurse    Filed: 7/24/2018  2:00 PM Date of Service: 7/24/2018  2:00 PM Status: Signed    : Marysol Richardson RN (Registered Nurse)    Encounter addended by: Marysol Richardson RN on: 7/24/2018  2:00 PM<BR>     Actions taken: Sign clinical note

## 2021-07-03 NOTE — ADDENDUM NOTE
Addendum Note by Lombardi, Susan L, RN at 8/4/2020  1:00 PM     Author: Lombardi, Susan L, RN Service: -- Author Type: RN, Care Manager    Filed: 8/4/2020  1:42 PM Date of Service: 8/4/2020  1:00 PM Status: Signed    : Lombardi, Susan L, RN (RN, Care Manager)    Encounter addended by: Lombardi, Susan L, RN on: 8/4/2020  1:42 PM      Actions taken: Clinical Note Signed, Charge Capture section accepted

## 2021-07-03 NOTE — ADDENDUM NOTE
Addendum Note by Richa Dee MD at 10/2/2019  9:15 AM     Author: Richa Dee MD Service: -- Author Type: Physician    Filed: 10/2/2019  7:47 PM Encounter Date: 10/2/2019 Status: Signed    : Richa Dee MD (Physician)    Addended by: RICHA DEE on: 10/2/2019 07:47 PM        Modules accepted: Orders

## 2021-07-03 NOTE — ADDENDUM NOTE
Addendum Note by Lombardi, Susan L, RN at 8/2/2019  8:32 AM     Author: Lombardi, Susan L, RN Service: -- Author Type: RN, Care Manager    Filed: 8/2/2019  9:19 AM Date of Service: 8/2/2019  8:32 AM Status: Signed    : Lombardi, Susan L, RN (RN, Care Manager)    Encounter addended by: Lombardi, Susan L, RN on: 8/2/2019  9:19 AM      Actions taken: Sign clinical note, Charge Capture section accepted

## 2021-07-03 NOTE — ADDENDUM NOTE
Addendum Note by Vanna Donovan RN at 12/6/2017 11:23 AM     Author: Vanna Donovan RN Service: -- Author Type: Registered Nurse    Filed: 12/6/2017 11:23 AM Encounter Date: 12/6/2017 Status: Signed    : Vanna Donovan RN (Registered Nurse)    Addended by: VANNA DONOVAN on: 12/6/2017 11:23 AM        Modules accepted: Orders, SmartSet

## 2021-07-07 ENCOUNTER — AMBULATORY - HEALTHEAST (OUTPATIENT)
Dept: INFUSION THERAPY | Facility: HOSPITAL | Age: 66
End: 2021-07-07

## 2021-07-07 DIAGNOSIS — C50.411 MALIGNANT NEOPLASM OF UPPER-OUTER QUADRANT OF RIGHT BREAST IN FEMALE, ESTROGEN RECEPTOR NEGATIVE (H): ICD-10-CM

## 2021-07-07 DIAGNOSIS — Z17.1 MALIGNANT NEOPLASM OF UPPER-OUTER QUADRANT OF RIGHT BREAST IN FEMALE, ESTROGEN RECEPTOR NEGATIVE (H): ICD-10-CM

## 2021-07-07 LAB
ALBUMIN SERPL-MCNC: 3.4 G/DL (ref 3.5–5)
ALP SERPL-CCNC: 120 U/L (ref 45–120)
ALT SERPL W P-5'-P-CCNC: 10 U/L (ref 0–45)
ANION GAP SERPL CALCULATED.3IONS-SCNC: 9 MMOL/L (ref 5–18)
AST SERPL W P-5'-P-CCNC: 25 U/L (ref 0–40)
BASOPHILS # BLD AUTO: 0 THOU/UL (ref 0–0.2)
BASOPHILS NFR BLD AUTO: 0 % (ref 0–2)
BILIRUB SERPL-MCNC: 0.3 MG/DL (ref 0–1)
BUN SERPL-MCNC: 18 MG/DL (ref 8–22)
CALCIUM SERPL-MCNC: 9.4 MG/DL (ref 8.5–10.5)
CHLORIDE BLD-SCNC: 106 MMOL/L (ref 98–107)
CO2 SERPL-SCNC: 27 MMOL/L (ref 22–31)
CREAT SERPL-MCNC: 0.8 MG/DL (ref 0.6–1.1)
EOSINOPHIL # BLD AUTO: 0.1 THOU/UL (ref 0–0.4)
EOSINOPHIL NFR BLD AUTO: 1 % (ref 0–6)
ERYTHROCYTE [DISTWIDTH] IN BLOOD BY AUTOMATED COUNT: 15.2 % (ref 11–14.5)
GFR SERPL CREATININE-BSD FRML MDRD: >60 ML/MIN/1.73M2
GLUCOSE BLD-MCNC: 105 MG/DL (ref 70–125)
HCT VFR BLD AUTO: 43.6 % (ref 35–47)
HGB BLD-MCNC: 13.6 G/DL (ref 12–16)
IMM GRANULOCYTES # BLD: 0.1 THOU/UL
IMM GRANULOCYTES NFR BLD: 1 %
LYMPHOCYTES # BLD AUTO: 1.7 THOU/UL (ref 0.8–4.4)
LYMPHOCYTES NFR BLD AUTO: 19 % (ref 20–40)
MCH RBC QN AUTO: 27.8 PG (ref 27–34)
MCHC RBC AUTO-ENTMCNC: 31.2 G/DL (ref 32–36)
MCV RBC AUTO: 89 FL (ref 80–100)
MONOCYTES # BLD AUTO: 0.7 THOU/UL (ref 0–0.9)
MONOCYTES NFR BLD AUTO: 7 % (ref 2–10)
NEUTROPHILS # BLD AUTO: 6.4 THOU/UL (ref 2–7.7)
NEUTROPHILS NFR BLD AUTO: 71 % (ref 50–70)
PLATELET # BLD AUTO: 253 THOU/UL (ref 140–440)
PMV BLD AUTO: 9.7 FL (ref 8.5–12.5)
POTASSIUM BLD-SCNC: 4.3 MMOL/L (ref 3.5–5)
PROT SERPL-MCNC: 7.3 G/DL (ref 6–8)
RBC # BLD AUTO: 4.89 MILL/UL (ref 3.8–5.4)
SODIUM SERPL-SCNC: 142 MMOL/L (ref 136–145)
WBC: 9 THOU/UL (ref 4–11)

## 2021-08-03 PROBLEM — J84.9: Status: RESOLVED | Noted: 2017-10-26 | Resolved: 2017-12-19

## 2021-08-03 PROBLEM — T45.1X5A: Status: RESOLVED | Noted: 2017-10-26 | Resolved: 2017-12-19

## 2021-08-10 ENCOUNTER — ANCILLARY PROCEDURE (OUTPATIENT)
Dept: MAMMOGRAPHY | Facility: CLINIC | Age: 66
End: 2021-08-10
Attending: INTERNAL MEDICINE
Payer: COMMERCIAL

## 2021-08-10 DIAGNOSIS — Z12.31 VISIT FOR SCREENING MAMMOGRAM: ICD-10-CM

## 2021-08-10 PROCEDURE — 77063 BREAST TOMOSYNTHESIS BI: CPT

## 2021-10-16 ENCOUNTER — HEALTH MAINTENANCE LETTER (OUTPATIENT)
Age: 66
End: 2021-10-16

## 2021-10-26 ENCOUNTER — LAB REQUISITION (OUTPATIENT)
Dept: LAB | Facility: CLINIC | Age: 66
End: 2021-10-26

## 2021-10-26 DIAGNOSIS — Z13.1 ENCOUNTER FOR SCREENING FOR DIABETES MELLITUS: ICD-10-CM

## 2021-10-26 DIAGNOSIS — E04.1 NONTOXIC SINGLE THYROID NODULE: ICD-10-CM

## 2021-10-26 DIAGNOSIS — Z13.220 ENCOUNTER FOR SCREENING FOR LIPOID DISORDERS: ICD-10-CM

## 2021-10-26 LAB
ALBUMIN SERPL-MCNC: 3.5 G/DL (ref 3.5–5)
ALP SERPL-CCNC: 123 U/L (ref 45–120)
ALT SERPL W P-5'-P-CCNC: <9 U/L (ref 0–45)
ANION GAP SERPL CALCULATED.3IONS-SCNC: 11 MMOL/L (ref 5–18)
AST SERPL W P-5'-P-CCNC: 26 U/L (ref 0–40)
BILIRUB SERPL-MCNC: 0.3 MG/DL (ref 0–1)
BUN SERPL-MCNC: 16 MG/DL (ref 8–22)
CALCIUM SERPL-MCNC: 9.1 MG/DL (ref 8.5–10.5)
CHLORIDE BLD-SCNC: 106 MMOL/L (ref 98–107)
CHOLEST SERPL-MCNC: 170 MG/DL
CO2 SERPL-SCNC: 24 MMOL/L (ref 22–31)
CREAT SERPL-MCNC: 0.83 MG/DL (ref 0.6–1.1)
FASTING STATUS PATIENT QL REPORTED: NORMAL
GFR SERPL CREATININE-BSD FRML MDRD: 74 ML/MIN/1.73M2
GLUCOSE BLD-MCNC: 99 MG/DL (ref 70–125)
HDLC SERPL-MCNC: 54 MG/DL
LDLC SERPL CALC-MCNC: 91 MG/DL
POTASSIUM BLD-SCNC: 4.4 MMOL/L (ref 3.5–5)
PROT SERPL-MCNC: 7 G/DL (ref 6–8)
SODIUM SERPL-SCNC: 141 MMOL/L (ref 136–145)
TRIGL SERPL-MCNC: 124 MG/DL
TSH SERPL DL<=0.005 MIU/L-ACNC: 1.12 UIU/ML (ref 0.3–5)

## 2021-10-26 PROCEDURE — 84443 ASSAY THYROID STIM HORMONE: CPT | Performed by: FAMILY MEDICINE

## 2021-10-26 PROCEDURE — 80061 LIPID PANEL: CPT | Performed by: FAMILY MEDICINE

## 2021-10-26 PROCEDURE — 80053 COMPREHEN METABOLIC PANEL: CPT | Performed by: FAMILY MEDICINE

## 2022-01-26 ENCOUNTER — LAB (OUTPATIENT)
Dept: INFUSION THERAPY | Facility: HOSPITAL | Age: 67
End: 2022-01-26
Attending: INTERNAL MEDICINE
Payer: COMMERCIAL

## 2022-01-26 ENCOUNTER — ONCOLOGY VISIT (OUTPATIENT)
Dept: ONCOLOGY | Facility: HOSPITAL | Age: 67
End: 2022-01-26
Attending: INTERNAL MEDICINE
Payer: COMMERCIAL

## 2022-01-26 VITALS
DIASTOLIC BLOOD PRESSURE: 92 MMHG | RESPIRATION RATE: 18 BRPM | BODY MASS INDEX: 32.2 KG/M2 | TEMPERATURE: 98.8 F | HEART RATE: 88 BPM | WEIGHT: 170.4 LBS | SYSTOLIC BLOOD PRESSURE: 164 MMHG | OXYGEN SATURATION: 95 %

## 2022-01-26 DIAGNOSIS — C50.411 MALIGNANT NEOPLASM OF UPPER-OUTER QUADRANT OF RIGHT BREAST IN FEMALE, ESTROGEN RECEPTOR NEGATIVE (H): Primary | ICD-10-CM

## 2022-01-26 DIAGNOSIS — Z17.1 MALIGNANT NEOPLASM OF UPPER-OUTER QUADRANT OF RIGHT BREAST IN FEMALE, ESTROGEN RECEPTOR NEGATIVE (H): Primary | ICD-10-CM

## 2022-01-26 DIAGNOSIS — C50.411 MALIGNANT NEOPLASM OF UPPER-OUTER QUADRANT OF RIGHT BREAST IN FEMALE, ESTROGEN RECEPTOR NEGATIVE (H): ICD-10-CM

## 2022-01-26 DIAGNOSIS — Z17.1 MALIGNANT NEOPLASM OF UPPER-OUTER QUADRANT OF RIGHT BREAST IN FEMALE, ESTROGEN RECEPTOR NEGATIVE (H): ICD-10-CM

## 2022-01-26 LAB
ERYTHROCYTE [DISTWIDTH] IN BLOOD BY AUTOMATED COUNT: 15 % (ref 10–15)
HCT VFR BLD AUTO: 44.3 % (ref 35–47)
HGB BLD-MCNC: 13.8 G/DL (ref 11.7–15.7)
MCH RBC QN AUTO: 27.2 PG (ref 26.5–33)
MCHC RBC AUTO-ENTMCNC: 31.2 G/DL (ref 31.5–36.5)
MCV RBC AUTO: 87 FL (ref 78–100)
PLATELET # BLD AUTO: 287 10E3/UL (ref 150–450)
RBC # BLD AUTO: 5.08 10E6/UL (ref 3.8–5.2)
WBC # BLD AUTO: 10.4 10E3/UL (ref 4–11)

## 2022-01-26 PROCEDURE — G0463 HOSPITAL OUTPT CLINIC VISIT: HCPCS

## 2022-01-26 PROCEDURE — 85027 COMPLETE CBC AUTOMATED: CPT

## 2022-01-26 PROCEDURE — 99213 OFFICE O/P EST LOW 20 MIN: CPT | Performed by: INTERNAL MEDICINE

## 2022-01-26 PROCEDURE — 36415 COLL VENOUS BLD VENIPUNCTURE: CPT

## 2022-01-26 ASSESSMENT — PAIN SCALES - GENERAL: PAINLEVEL: NO PAIN (0)

## 2022-01-26 NOTE — LETTER
"    1/26/2022         RE: Willa Hernandez  [address] [facility] [address] [facility] [address]        Dear Colleague,    Thank you for referring your patient, Willa Hernandez, to the [facility]. Please see a copy of my visit note below.    Oncology Rooming Note    January 26, 2022 2:54 PM   Willa Hernandez is a 66 year old female who presents for:    Chief Complaint   Patient presents with     Oncology Clinic Visit     Initial Vitals: BP (!) 164/92   Pulse 88   Temp 98.8  F (37.1  C)   Resp 18   Wt 77.3 kg (170 lb 6.4 oz)   SpO2 95%   BMI 32.20 kg/m   Estimated body mass index is 32.2 kg/m  as calculated from the following:    Height as of 8/2/19: 1.549 m (5' 1\").    Weight as of this encounter: 77.3 kg (170 lb 6.4 oz). Body surface area is 1.82 meters squared.  No Pain (0) Comment: Data Unavailable   No LMP recorded.  Allergies reviewed: Yes  Medications reviewed: Yes    Medications: Medication refills not needed today.  Pharmacy name entered into [facility]: [id number] - [place] [id number] [address]    Clinical concerns: None       Samantha Garcia LPN              [facility] Hematology and Oncology Progress Note    Patient: Willa Hernandez  MRN: [id number]  Date of Service: Jan 26, 2022        Assessment and Plan:    Cancer Staging  No matching staging information was found for the patient.    Breast cancer    Primary site: Breast (Left)    Staging method: AJCC 7th Edition    Clinical: Stage IA (T1c, N0, cM0) - Signed by Moise Dee MD on 8/13/2014    Pathologic free text: [id number]    Summary: Stage IA (T1c, N0, cM0)     1. Breast cancer: Remained stable clinically without any clinical evidence of recurrent disease.  Clinically stable without clinical evidence of recurrent disease.    Her mammograms are due every August.  We can continue every 6-month follow-up until she is 5 years out from her most recent recurrence which will be " this July.  At that point we could consider switching to yearly follow-up.    2.  Chemotherapy-induced peripheral neuropathy: Stable.  Grade 1.  Continue to follow clinically.  She has been on treatment for this.    ECOG Performance  0    Diagnosis:    1. Invasive ductal carcinoma of the left breast:  Triple negative. Stage X2fC9Li. Diagnosed July 2014. She had a positive margin at initial surgery. BRCA-.     2.  Invasive ductal carcinoma of the right breast: Triple negative.  Grade 3.  Clinical stage T2N1M0. Diagnosed July 2017.  MRI shows 3 foci and probable intervening tumor between these 3 foci.  Total size of abnormality in the MRI was 4.2 cm.  One suspicious-appearing node in the axilla.     3.  Pneumonitis: CT on October 11 shows diffuse bilateral mixed airspace and interstitial infiltrates. Possibly due to chemotherapy.  Bronchoscopy was done on October 20.  Cultures negative.  She seemed to respond to steroids.    Treatment:    She had a lumpectomy followed by 4 cycles of Taxotere and Cytoxan. This was completed in October 2014. She then had a reresection for the positive margin. She then had adjuvant radiation completed in February 2015.      Recurrence:  She was treated with neoadjuvant Adriamycin and Cytoxan for her second, most recent diagnosis. Chemotherapy started on August 16, 2017.  Cycle 4 given on September 27.  Weekly Taxol started October 11.  Taxol reduced 25% for neuropathy.  Last was completed on January 17, 2018     Right-sided lumpectomy performed in February 12, 2018.  Pathology showed complete response.  No residual disease.  Adjuvant radiation completed on May 11, 2018    Interim History:    Willa returns today for a follow-up visit.  She was last in clinic about 6 months ago.  Generally she has been stable.  No major changes in her health.  Continues to have some mild neuropathy in the fingertips and toes.  No shortness of breath, headaches, or bony pain.  No other acute complaints  today.    Review of Systems:    As above in the history.     Review of Systems otherwise Negative for:  General: chills, fever or night sweats  Psychological: anxiety or depression  Ophthalmic: blurry vision, double vision or loss of vision, vision change  ENT: epistaxis, oral lesions, hearing changes  Hematological and Lymphatic: bleeding, bruising, jaundice, swollen lymph nodes  Endocrine: hot flashes, unexpected weight changes  Respiratory: cough, hemoptysis, orthopnea or shortness of breath/ROSE  Cardiovascular: chest pain, edema, palpitations or PND  Gastrointestinal: abdominal pain, blood in stools, change in bowel habits, constipation, diarrhea or nausea/vomiting  Genito-Urinary: change in urinary stream, incontinence, frequency/urgency  Musculoskeletal: joint pain, stiffness, swelling, muscle pain  Neurological: dizziness, headaches  Dermatological: lumps and rash    Past History:    Past Medical History:   Diagnosis Date     Asthma      Asthma      Breast cancer (H) 2014    left     Breast cancer (H) 2017    right     Breast cancer (H) 2014    left     Breast cancer (H) 2017    right     Bronchitis, chronic (H)      Bronchitis, chronic (H)      Colon polyp 2009    one precancerous polyp     Colon polyp 2009    one precancerous polyp     Cytoxan-induced pulmonary interstitial disease (H)      Cytoxan-induced pulmonary interstitial disease (H)      Disease of thyroid gland      Disease of thyroid gland      GERD (gastroesophageal reflux disease)      GERD (gastroesophageal reflux disease)      Hx antineoplastic chemotherapy 2014     Hx antineoplastic chemotherapy 2014     Hx of radiation therapy 2014     Hx of radiation therapy 2014     Peripheral neuropathy     hands and feet     Peripheral neuropathy     hands and feet     Pneumonia      Pneumonia      Sinusitis      Sinusitis      Uterine polyp      Uterine polyp      Physical Exam:    BP (!) 164/92   Pulse 88   Temp 98.8  F (37.1  C)   Resp 18   Wt  77.3 kg (170 lb 6.4 oz)   SpO2 95%   BMI 32.20 kg/m      General: patient appears stated age of 66 year old. Nontoxic and in no distress.   HEENT: Head: atraumatic, normocephalic. Sclerae anicteric.  Chest:  Normal respiratory effort  Cardiac:  No edema.   Abdomen: abdomen is non-distended  Extremities: normal tone and muscle bulk.  Skin: no lesions or rash on visible skin. Warm and dry.   CNS: alert and oriented. Grossly non-focal.   Psychiatric: normal mood and affect.     Lab Results:    Recent Results (from the past 168 hour(s))   CBC with platelets   Result Value Ref Range    WBC Count 10.4 4.0 - 11.0 10e3/uL    RBC Count 5.08 3.80 - 5.20 10e6/uL    Hemoglobin 13.8 11.7 - 15.7 g/dL    Hematocrit 44.3 35.0 - 47.0 %    MCV 87 78 - 100 fL    MCH 27.2 26.5 - 33.0 pg    MCHC 31.2 (L) 31.5 - 36.5 g/dL    RDW 15.0 10.0 - 15.0 %    Platelet Count 287 150 - 450 10e3/uL     Imaging:    No results found.      Signed by: Moise Dee MD        Again, thank you for allowing me to participate in the care of your patient.        Sincerely,        Moise Dee MD

## 2022-01-26 NOTE — PROGRESS NOTES
"Oncology Rooming Note    January 26, 2022 2:54 PM   Willa Hernandez is a 66 year old female who presents for:    Chief Complaint   Patient presents with     Oncology Clinic Visit     Initial Vitals: BP (!) 164/92   Pulse 88   Temp 98.8  F (37.1  C)   Resp 18   Wt 77.3 kg (170 lb 6.4 oz)   SpO2 95%   BMI 32.20 kg/m   Estimated body mass index is 32.2 kg/m  as calculated from the following:    Height as of 8/2/19: 1.549 m (5' 1\").    Weight as of this encounter: 77.3 kg (170 lb 6.4 oz). Body surface area is 1.82 meters squared.  No Pain (0) Comment: Data Unavailable   No LMP recorded.  Allergies reviewed: Yes  Medications reviewed: Yes    Medications: Medication refills not needed today.  Pharmacy name entered into [facility]: [id number] - [place] [id number] [address]    Clinical concerns: None       Samantha Garcia LPN            "

## 2022-01-27 NOTE — PROGRESS NOTES
[facility] Hematology and Oncology Progress Note    Patient: Willa Hernandez  MRN: [id number]  Date of Service: Jan 26, 2022        Assessment and Plan:    Cancer Staging  No matching staging information was found for the patient.    Breast cancer    Primary site: Breast (Left)    Staging method: AJCC 7th Edition    Clinical: Stage IA (T1c, N0, cM0) - Signed by Moise Dee MD on 8/13/2014    Pathologic free text: [id number]    Summary: Stage IA (T1c, N0, cM0)     1. Breast cancer: Remained stable clinically without any clinical evidence of recurrent disease.  Clinically stable without clinical evidence of recurrent disease.    Her mammograms are due every August.  We can continue every 6-month follow-up until she is 5 years out from her most recent recurrence which will be this July.  At that point we could consider switching to yearly follow-up.    2.  Chemotherapy-induced peripheral neuropathy: Stable.  Grade 1.  Continue to follow clinically.  She has been on treatment for this.    ECOG Performance  0    Diagnosis:    1. Invasive ductal carcinoma of the left breast:  Triple negative. Stage [id number]. Diagnosed July 2014. She had a positive margin at initial surgery. BRCA-.     2.  Invasive ductal carcinoma of the right breast: Triple negative.  Grade 3.  Clinical stage [id number]. Diagnosed July 2017.  MRI shows 3 foci and probable intervening tumor between these 3 foci.  Total size of abnormality in the MRI was 4.2 cm.  One suspicious-appearing node in the axilla.     3.  Pneumonitis: CT on October 11 shows diffuse bilateral mixed airspace and interstitial infiltrates. Possibly due to chemotherapy.  Bronchoscopy was done on October 20.  Cultures negative.  She seemed to respond to steroids.    Treatment:    She had a lumpectomy followed by 4 cycles of Taxotere and Cytoxan. This was completed in October 2014. She then had a reresection for the positive margin. She then had adjuvant radiation  completed in February 2015.      Recurrence:  She was treated with neoadjuvant Adriamycin and Cytoxan for her second, most recent diagnosis. Chemotherapy started on August 16, 2017.  Cycle 4 given on September 27.  Weekly Taxol started October 11.  Taxol reduced 25% for neuropathy.  Last was completed on January 17, 2018     Right-sided lumpectomy performed in February 12, 2018.  Pathology showed complete response.  No residual disease.  Adjuvant radiation completed on May 11, 2018    Interim History:    Willa returns today for a follow-up visit.  She was last in clinic about 6 months ago.  Generally she has been stable.  No major changes in her health.  Continues to have some mild neuropathy in the fingertips and toes.  No shortness of breath, headaches, or bony pain.  No other acute complaints today.    Review of Systems:    As above in the history.     Review of Systems otherwise Negative for:  General: chills, fever or night sweats  Psychological: anxiety or depression  Ophthalmic: blurry vision, double vision or loss of vision, vision change  ENT: epistaxis, oral lesions, hearing changes  Hematological and Lymphatic: bleeding, bruising, jaundice, swollen lymph nodes  Endocrine: hot flashes, unexpected weight changes  Respiratory: cough, hemoptysis, orthopnea or shortness of breath/ROSE  Cardiovascular: chest pain, edema, palpitations or PND  Gastrointestinal: abdominal pain, blood in stools, change in bowel habits, constipation, diarrhea or nausea/vomiting  Genito-Urinary: change in urinary stream, incontinence, frequency/urgency  Musculoskeletal: joint pain, stiffness, swelling, muscle pain  Neurological: dizziness, headaches  Dermatological: lumps and rash    Past History:    Past Medical History:   Diagnosis Date     Asthma      Asthma      Breast cancer (H) 2014    left     Breast cancer (H) 2017    right     Breast cancer (H) 2014    left     Breast cancer (H) 2017    right     Bronchitis, chronic (H)       Bronchitis, chronic (H)      Colon polyp 2009    one precancerous polyp     Colon polyp 2009    one precancerous polyp     Cytoxan-induced pulmonary interstitial disease (H)      Cytoxan-induced pulmonary interstitial disease (H)      Disease of thyroid gland      Disease of thyroid gland      GERD (gastroesophageal reflux disease)      GERD (gastroesophageal reflux disease)      Hx antineoplastic chemotherapy 2014     Hx antineoplastic chemotherapy 2014     Hx of radiation therapy 2014     Hx of radiation therapy 2014     Peripheral neuropathy     hands and feet     Peripheral neuropathy     hands and feet     Pneumonia      Pneumonia      Sinusitis      Sinusitis      Uterine polyp      Uterine polyp      Physical Exam:    BP (!) 164/92   Pulse 88   Temp 98.8  F (37.1  C)   Resp 18   Wt 77.3 kg (170 lb 6.4 oz)   SpO2 95%   BMI 32.20 kg/m      General: patient appears stated age of 66 year old. Nontoxic and in no distress.   HEENT: Head: atraumatic, normocephalic. Sclerae anicteric.  Chest:  Normal respiratory effort  Cardiac:  No edema.   Abdomen: abdomen is non-distended  Extremities: normal tone and muscle bulk.  Skin: no lesions or rash on visible skin. Warm and dry.   CNS: alert and oriented. Grossly non-focal.   Psychiatric: normal mood and affect.     Lab Results:    Recent Results (from the past 168 hour(s))   CBC with platelets   Result Value Ref Range    WBC Count 10.4 4.0 - 11.0 10e3/uL    RBC Count 5.08 3.80 - 5.20 10e6/uL    Hemoglobin 13.8 11.7 - 15.7 g/dL    Hematocrit 44.3 35.0 - 47.0 %    MCV 87 78 - 100 fL    MCH 27.2 26.5 - 33.0 pg    MCHC 31.2 (L) 31.5 - 36.5 g/dL    RDW 15.0 10.0 - 15.0 %    Platelet Count 287 150 - 450 10e3/uL     Imaging:    No results found.      Signed by: Moise Dee MD

## 2022-05-11 ENCOUNTER — LAB REQUISITION (OUTPATIENT)
Dept: LAB | Facility: CLINIC | Age: 67
End: 2022-05-11
Payer: COMMERCIAL

## 2022-05-11 DIAGNOSIS — R05.9 COUGH, UNSPECIFIED: ICD-10-CM

## 2022-05-11 PROCEDURE — U0005 INFEC AGEN DETEC AMPLI PROBE: HCPCS | Mod: ORL | Performed by: PHYSICIAN ASSISTANT

## 2022-05-12 LAB — SARS-COV-2 RNA RESP QL NAA+PROBE: NEGATIVE

## 2022-07-10 LAB
ALBUMIN SERPL-MCNC: 3.8 G/DL (ref 3.5–5)
ALP SERPL-CCNC: 104 U/L (ref 45–120)
ALT SERPL W P-5'-P-CCNC: 13 U/L (ref 0–45)
ANION GAP SERPL CALCULATED.3IONS-SCNC: 8 MMOL/L (ref 5–18)
AST SERPL W P-5'-P-CCNC: 27 U/L (ref 0–40)
BASOPHILS # BLD AUTO: 0 10E3/UL (ref 0–0.2)
BASOPHILS NFR BLD AUTO: 0 %
BILIRUB DIRECT SERPL-MCNC: 0.2 MG/DL
BILIRUB SERPL-MCNC: 0.5 MG/DL (ref 0–1)
BUN SERPL-MCNC: 13 MG/DL (ref 8–22)
CALCIUM SERPL-MCNC: 9.6 MG/DL (ref 8.5–10.5)
CHLORIDE BLD-SCNC: 101 MMOL/L (ref 98–107)
CO2 SERPL-SCNC: 27 MMOL/L (ref 22–31)
CREAT SERPL-MCNC: 0.83 MG/DL (ref 0.6–1.1)
EOSINOPHIL # BLD AUTO: 0 10E3/UL (ref 0–0.7)
EOSINOPHIL NFR BLD AUTO: 0 %
ERYTHROCYTE [DISTWIDTH] IN BLOOD BY AUTOMATED COUNT: 15.4 % (ref 10–15)
GFR SERPL CREATININE-BSD FRML MDRD: 77 ML/MIN/1.73M2
GLUCOSE BLD-MCNC: 163 MG/DL (ref 70–125)
GLUCOSE BLDC GLUCOMTR-MCNC: 167 MG/DL (ref 70–99)
HCT VFR BLD AUTO: 49 % (ref 35–47)
HGB BLD-MCNC: 15.5 G/DL (ref 11.7–15.7)
HOLD SPECIMEN: NORMAL
IMM GRANULOCYTES # BLD: 0.1 10E3/UL
IMM GRANULOCYTES NFR BLD: 1 %
LIPASE SERPL-CCNC: 25 U/L (ref 0–52)
LYMPHOCYTES # BLD AUTO: 1.7 10E3/UL (ref 0.8–5.3)
LYMPHOCYTES NFR BLD AUTO: 11 %
MAGNESIUM SERPL-MCNC: 2.2 MG/DL (ref 1.8–2.6)
MCH RBC QN AUTO: 27.4 PG (ref 26.5–33)
MCHC RBC AUTO-ENTMCNC: 31.6 G/DL (ref 31.5–36.5)
MCV RBC AUTO: 87 FL (ref 78–100)
MONOCYTES # BLD AUTO: 0.6 10E3/UL (ref 0–1.3)
MONOCYTES NFR BLD AUTO: 4 %
NEUTROPHILS # BLD AUTO: 13.1 10E3/UL (ref 1.6–8.3)
NEUTROPHILS NFR BLD AUTO: 84 %
NRBC # BLD AUTO: 0 10E3/UL
NRBC BLD AUTO-RTO: 0 /100
PLATELET # BLD AUTO: 353 10E3/UL (ref 150–450)
POTASSIUM BLD-SCNC: 3.9 MMOL/L (ref 3.5–5)
PROT SERPL-MCNC: 8.7 G/DL (ref 6–8)
RBC # BLD AUTO: 5.65 10E6/UL (ref 3.8–5.2)
SODIUM SERPL-SCNC: 136 MMOL/L (ref 136–145)
TROPONIN I SERPL-MCNC: <0.01 NG/ML (ref 0–0.29)
WBC # BLD AUTO: 15.3 10E3/UL (ref 4–11)

## 2022-07-10 PROCEDURE — 87798 DETECT AGENT NOS DNA AMP: CPT | Performed by: EMERGENCY MEDICINE

## 2022-07-10 PROCEDURE — 83735 ASSAY OF MAGNESIUM: CPT | Performed by: EMERGENCY MEDICINE

## 2022-07-10 PROCEDURE — 85025 COMPLETE CBC W/AUTO DIFF WBC: CPT | Performed by: EMERGENCY MEDICINE

## 2022-07-10 PROCEDURE — 93005 ELECTROCARDIOGRAM TRACING: CPT | Performed by: EMERGENCY MEDICINE

## 2022-07-10 PROCEDURE — C9803 HOPD COVID-19 SPEC COLLECT: HCPCS

## 2022-07-10 PROCEDURE — 99285 EMERGENCY DEPT VISIT HI MDM: CPT | Mod: 25

## 2022-07-10 PROCEDURE — 36415 COLL VENOUS BLD VENIPUNCTURE: CPT | Performed by: EMERGENCY MEDICINE

## 2022-07-10 PROCEDURE — 83690 ASSAY OF LIPASE: CPT | Performed by: EMERGENCY MEDICINE

## 2022-07-10 PROCEDURE — 87040 BLOOD CULTURE FOR BACTERIA: CPT | Performed by: EMERGENCY MEDICINE

## 2022-07-10 PROCEDURE — 82248 BILIRUBIN DIRECT: CPT | Performed by: EMERGENCY MEDICINE

## 2022-07-10 PROCEDURE — 84484 ASSAY OF TROPONIN QUANT: CPT | Performed by: EMERGENCY MEDICINE

## 2022-07-10 PROCEDURE — 80053 COMPREHEN METABOLIC PANEL: CPT | Performed by: EMERGENCY MEDICINE

## 2022-07-11 ENCOUNTER — HOSPITAL ENCOUNTER (INPATIENT)
Facility: HOSPITAL | Age: 67
LOS: 1 days | Discharge: HOME | DRG: 864 | End: 2022-07-13
Attending: EMERGENCY MEDICINE | Admitting: INTERNAL MEDICINE
Payer: COMMERCIAL

## 2022-07-11 ENCOUNTER — APPOINTMENT (OUTPATIENT)
Dept: RADIOLOGY | Facility: HOSPITAL | Age: 67
DRG: 864 | End: 2022-07-11
Attending: EMERGENCY MEDICINE
Payer: COMMERCIAL

## 2022-07-11 ENCOUNTER — APPOINTMENT (OUTPATIENT)
Dept: CT IMAGING | Facility: HOSPITAL | Age: 67
DRG: 864 | End: 2022-07-11
Attending: EMERGENCY MEDICINE
Payer: COMMERCIAL

## 2022-07-11 DIAGNOSIS — A41.9 SEPSIS, DUE TO UNSPECIFIED ORGANISM, UNSPECIFIED WHETHER ACUTE ORGAN DYSFUNCTION PRESENT (H): ICD-10-CM

## 2022-07-11 DIAGNOSIS — R11.11 VOMITING WITHOUT NAUSEA, INTRACTABILITY OF VOMITING NOT SPECIFIED, UNSPECIFIED VOMITING TYPE: ICD-10-CM

## 2022-07-11 DIAGNOSIS — R50.9 ACUTE FEBRILE ILLNESS: Primary | ICD-10-CM

## 2022-07-11 DIAGNOSIS — G44.209 TENSION HEADACHE: ICD-10-CM

## 2022-07-11 PROBLEM — R11.10 VOMITING: Status: ACTIVE | Noted: 2022-07-11

## 2022-07-11 PROBLEM — E87.20 LACTIC ACIDOSIS: Status: ACTIVE | Noted: 2022-07-11

## 2022-07-11 PROBLEM — E66.811 CLASS 1 OBESITY IN ADULT: Status: ACTIVE | Noted: 2022-07-11

## 2022-07-11 LAB
ALBUMIN SERPL-MCNC: 3.6 G/DL (ref 3.5–5)
ALBUMIN UR-MCNC: 30 MG/DL
ALP SERPL-CCNC: 94 U/L (ref 45–120)
ALT SERPL W P-5'-P-CCNC: 12 U/L (ref 0–45)
ANAPLASMA BLD MOD GIEMSA: NEGATIVE
ANION GAP SERPL CALCULATED.3IONS-SCNC: 11 MMOL/L (ref 5–18)
APPEARANCE UR: CLEAR
AST SERPL W P-5'-P-CCNC: 24 U/L (ref 0–40)
B BURGDOR IGG+IGM SER QL: 0.39
BABESIA SPEC: NEGATIVE
BILIRUB SERPL-MCNC: 0.5 MG/DL (ref 0–1)
BILIRUB UR QL STRIP: NEGATIVE
BUN SERPL-MCNC: 13 MG/DL (ref 8–22)
CALCIUM SERPL-MCNC: 8.6 MG/DL (ref 8.5–10.5)
CHLORIDE BLD-SCNC: 104 MMOL/L (ref 98–107)
CO2 SERPL-SCNC: 23 MMOL/L (ref 22–31)
COLOR UR AUTO: ABNORMAL
CREAT SERPL-MCNC: 0.74 MG/DL (ref 0.6–1.1)
EHRLICHIA SPEC QL MICRO: NEGATIVE
ERYTHROCYTE [DISTWIDTH] IN BLOOD BY AUTOMATED COUNT: 15.4 % (ref 10–15)
FLUAV RNA SPEC QL NAA+PROBE: NEGATIVE
FLUBV RNA RESP QL NAA+PROBE: NEGATIVE
GFR SERPL CREATININE-BSD FRML MDRD: 88 ML/MIN/1.73M2
GLUCOSE BLD-MCNC: 120 MG/DL (ref 70–125)
GLUCOSE UR STRIP-MCNC: NEGATIVE MG/DL
HCT VFR BLD AUTO: 46 % (ref 35–47)
HGB BLD-MCNC: 14.4 G/DL (ref 11.7–15.7)
HGB UR QL STRIP: NEGATIVE
HOLD SPECIMEN: NORMAL
KETONES UR STRIP-MCNC: NEGATIVE MG/DL
LACTATE SERPL-SCNC: 1.9 MMOL/L (ref 0.7–2)
LACTATE SERPL-SCNC: 2.5 MMOL/L (ref 0.7–2)
LEUKOCYTE ESTERASE UR QL STRIP: NEGATIVE
MCH RBC QN AUTO: 27.6 PG (ref 26.5–33)
MCHC RBC AUTO-ENTMCNC: 31.3 G/DL (ref 31.5–36.5)
MCV RBC AUTO: 88 FL (ref 78–100)
MUCOUS THREADS #/AREA URNS LPF: PRESENT /LPF
NITRATE UR QL: NEGATIVE
PH UR STRIP: 6.5 [PH] (ref 5–7)
PLATELET # BLD AUTO: 333 10E3/UL (ref 150–450)
POTASSIUM BLD-SCNC: 4.6 MMOL/L (ref 3.5–5)
PROT SERPL-MCNC: 7.2 G/DL (ref 6–8)
RBC # BLD AUTO: 5.21 10E6/UL (ref 3.8–5.2)
RBC URINE: 2 /HPF
SARS-COV-2 RNA RESP QL NAA+PROBE: NEGATIVE
SODIUM SERPL-SCNC: 138 MMOL/L (ref 136–145)
SP GR UR STRIP: >1.05 (ref 1–1.03)
SQUAMOUS EPITHELIAL: 69 /HPF
UROBILINOGEN UR STRIP-MCNC: <2 MG/DL
WBC # BLD AUTO: 16.2 10E3/UL (ref 4–11)
WBC URINE: 3 /HPF

## 2022-07-11 PROCEDURE — 96372 THER/PROPH/DIAG INJ SC/IM: CPT | Performed by: INTERNAL MEDICINE

## 2022-07-11 PROCEDURE — G0378 HOSPITAL OBSERVATION PER HR: HCPCS

## 2022-07-11 PROCEDURE — 250N000011 HC RX IP 250 OP 636: Performed by: INTERNAL MEDICINE

## 2022-07-11 PROCEDURE — 36415 COLL VENOUS BLD VENIPUNCTURE: CPT | Performed by: INTERNAL MEDICINE

## 2022-07-11 PROCEDURE — 96376 TX/PRO/DX INJ SAME DRUG ADON: CPT

## 2022-07-11 PROCEDURE — 36415 COLL VENOUS BLD VENIPUNCTURE: CPT | Performed by: EMERGENCY MEDICINE

## 2022-07-11 PROCEDURE — 96365 THER/PROPH/DIAG IV INF INIT: CPT | Mod: 59

## 2022-07-11 PROCEDURE — 96375 TX/PRO/DX INJ NEW DRUG ADDON: CPT

## 2022-07-11 PROCEDURE — 258N000003 HC RX IP 258 OP 636: Performed by: INTERNAL MEDICINE

## 2022-07-11 PROCEDURE — 96361 HYDRATE IV INFUSION ADD-ON: CPT

## 2022-07-11 PROCEDURE — 80053 COMPREHEN METABOLIC PANEL: CPT | Performed by: INTERNAL MEDICINE

## 2022-07-11 PROCEDURE — 250N000011 HC RX IP 250 OP 636: Performed by: EMERGENCY MEDICINE

## 2022-07-11 PROCEDURE — 250N000013 HC RX MED GY IP 250 OP 250 PS 637: Performed by: INTERNAL MEDICINE

## 2022-07-11 PROCEDURE — 99220 PR INITIAL OBSERVATION CARE,LEVEL III: CPT | Performed by: INTERNAL MEDICINE

## 2022-07-11 PROCEDURE — 83605 ASSAY OF LACTIC ACID: CPT | Performed by: INTERNAL MEDICINE

## 2022-07-11 PROCEDURE — 87636 SARSCOV2 & INF A&B AMP PRB: CPT | Performed by: EMERGENCY MEDICINE

## 2022-07-11 PROCEDURE — 96367 TX/PROPH/DG ADDL SEQ IV INF: CPT

## 2022-07-11 PROCEDURE — 71045 X-RAY EXAM CHEST 1 VIEW: CPT

## 2022-07-11 PROCEDURE — 85014 HEMATOCRIT: CPT | Performed by: INTERNAL MEDICINE

## 2022-07-11 PROCEDURE — 83605 ASSAY OF LACTIC ACID: CPT | Performed by: EMERGENCY MEDICINE

## 2022-07-11 PROCEDURE — 258N000003 HC RX IP 258 OP 636: Performed by: EMERGENCY MEDICINE

## 2022-07-11 PROCEDURE — 87015 SPECIMEN INFECT AGNT CONCNTJ: CPT | Performed by: EMERGENCY MEDICINE

## 2022-07-11 PROCEDURE — C9113 INJ PANTOPRAZOLE SODIUM, VIA: HCPCS | Performed by: INTERNAL MEDICINE

## 2022-07-11 PROCEDURE — 250N000011 HC RX IP 250 OP 636: Performed by: STUDENT IN AN ORGANIZED HEALTH CARE EDUCATION/TRAINING PROGRAM

## 2022-07-11 PROCEDURE — 96366 THER/PROPH/DIAG IV INF ADDON: CPT

## 2022-07-11 PROCEDURE — 86618 LYME DISEASE ANTIBODY: CPT | Performed by: EMERGENCY MEDICINE

## 2022-07-11 PROCEDURE — 74177 CT ABD & PELVIS W/CONTRAST: CPT

## 2022-07-11 PROCEDURE — 81001 URINALYSIS AUTO W/SCOPE: CPT | Performed by: EMERGENCY MEDICINE

## 2022-07-11 RX ORDER — KETOROLAC TROMETHAMINE 15 MG/ML
15 INJECTION, SOLUTION INTRAMUSCULAR; INTRAVENOUS EVERY 6 HOURS PRN
Status: DISCONTINUED | OUTPATIENT
Start: 2022-07-11 | End: 2022-07-13 | Stop reason: HOSPADM

## 2022-07-11 RX ORDER — CALCIUM CARBONATE 500 MG/1
1000 TABLET, CHEWABLE ORAL 4 TIMES DAILY PRN
Status: DISCONTINUED | OUTPATIENT
Start: 2022-07-11 | End: 2022-07-13 | Stop reason: HOSPADM

## 2022-07-11 RX ORDER — LIDOCAINE 40 MG/G
CREAM TOPICAL
Status: DISCONTINUED | OUTPATIENT
Start: 2022-07-11 | End: 2022-07-13 | Stop reason: HOSPADM

## 2022-07-11 RX ORDER — FEXOFENADINE HCL 60 MG/1
180 TABLET, FILM COATED ORAL DAILY
Status: DISCONTINUED | OUTPATIENT
Start: 2022-07-11 | End: 2022-07-13 | Stop reason: HOSPADM

## 2022-07-11 RX ORDER — KETOROLAC TROMETHAMINE 15 MG/ML
15 INJECTION, SOLUTION INTRAMUSCULAR; INTRAVENOUS ONCE
Status: COMPLETED | OUTPATIENT
Start: 2022-07-11 | End: 2022-07-11

## 2022-07-11 RX ORDER — DOXYCYCLINE 100 MG/10ML
100 INJECTION, POWDER, LYOPHILIZED, FOR SOLUTION INTRAVENOUS EVERY 12 HOURS
Status: DISCONTINUED | OUTPATIENT
Start: 2022-07-11 | End: 2022-07-11

## 2022-07-11 RX ORDER — ONDANSETRON 2 MG/ML
4 INJECTION INTRAMUSCULAR; INTRAVENOUS EVERY 6 HOURS PRN
Status: DISCONTINUED | OUTPATIENT
Start: 2022-07-11 | End: 2022-07-13 | Stop reason: HOSPADM

## 2022-07-11 RX ORDER — CEFAZOLIN SODIUM 1 G/50ML
1750 SOLUTION INTRAVENOUS ONCE
Status: COMPLETED | OUTPATIENT
Start: 2022-07-11 | End: 2022-07-11

## 2022-07-11 RX ORDER — MONTELUKAST SODIUM 10 MG/1
10 TABLET ORAL AT BEDTIME
Status: DISCONTINUED | OUTPATIENT
Start: 2022-07-11 | End: 2022-07-13 | Stop reason: HOSPADM

## 2022-07-11 RX ORDER — VANCOMYCIN HYDROCHLORIDE 1 G/200ML
1000 INJECTION, SOLUTION INTRAVENOUS EVERY 12 HOURS
Status: DISCONTINUED | OUTPATIENT
Start: 2022-07-11 | End: 2022-07-13

## 2022-07-11 RX ORDER — PROCHLORPERAZINE 25 MG
12.5 SUPPOSITORY, RECTAL RECTAL EVERY 12 HOURS PRN
Status: DISCONTINUED | OUTPATIENT
Start: 2022-07-11 | End: 2022-07-13 | Stop reason: HOSPADM

## 2022-07-11 RX ORDER — PROCHLORPERAZINE MALEATE 5 MG
5 TABLET ORAL EVERY 6 HOURS PRN
Status: DISCONTINUED | OUTPATIENT
Start: 2022-07-11 | End: 2022-07-13 | Stop reason: HOSPADM

## 2022-07-11 RX ORDER — ENOXAPARIN SODIUM 100 MG/ML
40 INJECTION SUBCUTANEOUS EVERY 24 HOURS
Status: DISCONTINUED | OUTPATIENT
Start: 2022-07-11 | End: 2022-07-13 | Stop reason: HOSPADM

## 2022-07-11 RX ORDER — IOPAMIDOL 755 MG/ML
75 INJECTION, SOLUTION INTRAVASCULAR ONCE
Status: COMPLETED | OUTPATIENT
Start: 2022-07-11 | End: 2022-07-11

## 2022-07-11 RX ORDER — PIPERACILLIN SODIUM, TAZOBACTAM SODIUM 3; .375 G/15ML; G/15ML
3.38 INJECTION, POWDER, LYOPHILIZED, FOR SOLUTION INTRAVENOUS ONCE
Status: COMPLETED | OUTPATIENT
Start: 2022-07-11 | End: 2022-07-11

## 2022-07-11 RX ORDER — ACETAMINOPHEN 325 MG/1
650 TABLET ORAL PRN
COMMUNITY

## 2022-07-11 RX ORDER — ONDANSETRON 4 MG/1
4 TABLET, ORALLY DISINTEGRATING ORAL EVERY 6 HOURS PRN
Status: DISCONTINUED | OUTPATIENT
Start: 2022-07-11 | End: 2022-07-13 | Stop reason: HOSPADM

## 2022-07-11 RX ORDER — ALBUTEROL SULFATE 90 UG/1
2 AEROSOL, METERED RESPIRATORY (INHALATION) EVERY 4 HOURS PRN
Status: DISCONTINUED | OUTPATIENT
Start: 2022-07-11 | End: 2022-07-13 | Stop reason: HOSPADM

## 2022-07-11 RX ORDER — SODIUM CHLORIDE 9 MG/ML
INJECTION, SOLUTION INTRAVENOUS CONTINUOUS
Status: DISCONTINUED | OUTPATIENT
Start: 2022-07-11 | End: 2022-07-12

## 2022-07-11 RX ORDER — METOCLOPRAMIDE HYDROCHLORIDE 5 MG/ML
5 INJECTION INTRAMUSCULAR; INTRAVENOUS EVERY 6 HOURS PRN
Status: DISCONTINUED | OUTPATIENT
Start: 2022-07-11 | End: 2022-07-13 | Stop reason: HOSPADM

## 2022-07-11 RX ORDER — ACETAMINOPHEN 325 MG/1
650 TABLET ORAL EVERY 6 HOURS PRN
Status: DISCONTINUED | OUTPATIENT
Start: 2022-07-11 | End: 2022-07-13 | Stop reason: HOSPADM

## 2022-07-11 RX ORDER — METOCLOPRAMIDE HYDROCHLORIDE 5 MG/ML
10 INJECTION INTRAMUSCULAR; INTRAVENOUS ONCE
Status: COMPLETED | OUTPATIENT
Start: 2022-07-11 | End: 2022-07-11

## 2022-07-11 RX ORDER — PIPERACILLIN SODIUM, TAZOBACTAM SODIUM 3; .375 G/15ML; G/15ML
3.38 INJECTION, POWDER, LYOPHILIZED, FOR SOLUTION INTRAVENOUS EVERY 8 HOURS
Status: DISCONTINUED | OUTPATIENT
Start: 2022-07-11 | End: 2022-07-13 | Stop reason: HOSPADM

## 2022-07-11 RX ORDER — ACETAMINOPHEN 325 MG/1
650 TABLET ORAL EVERY 4 HOURS PRN
Status: DISCONTINUED | OUTPATIENT
Start: 2022-07-11 | End: 2022-07-11

## 2022-07-11 RX ADMIN — IOPAMIDOL 75 ML: 755 INJECTION, SOLUTION INTRAVENOUS at 04:12

## 2022-07-11 RX ADMIN — SODIUM CHLORIDE 1000 ML: 9 INJECTION, SOLUTION INTRAVENOUS at 03:21

## 2022-07-11 RX ADMIN — SODIUM CHLORIDE 1000 ML: 9 INJECTION, SOLUTION INTRAVENOUS at 05:08

## 2022-07-11 RX ADMIN — DOXYCYCLINE 100 MG: 100 INJECTION, POWDER, LYOPHILIZED, FOR SOLUTION INTRAVENOUS at 11:05

## 2022-07-11 RX ADMIN — METOCLOPRAMIDE HYDROCHLORIDE 10 MG: 5 INJECTION INTRAMUSCULAR; INTRAVENOUS at 02:54

## 2022-07-11 RX ADMIN — METOCLOPRAMIDE HYDROCHLORIDE 5 MG: 5 INJECTION INTRAMUSCULAR; INTRAVENOUS at 21:00

## 2022-07-11 RX ADMIN — PROCHLORPERAZINE EDISYLATE 5 MG: 5 INJECTION INTRAMUSCULAR; INTRAVENOUS at 05:23

## 2022-07-11 RX ADMIN — KETOROLAC TROMETHAMINE 15 MG: 15 INJECTION, SOLUTION INTRAMUSCULAR; INTRAVENOUS at 03:21

## 2022-07-11 RX ADMIN — VANCOMYCIN HYDROCHLORIDE 1750 MG: 5 INJECTION, POWDER, LYOPHILIZED, FOR SOLUTION INTRAVENOUS at 10:07

## 2022-07-11 RX ADMIN — PANTOPRAZOLE SODIUM 40 MG: 40 INJECTION, POWDER, FOR SOLUTION INTRAVENOUS at 09:32

## 2022-07-11 RX ADMIN — SODIUM CHLORIDE: 9 INJECTION, SOLUTION INTRAVENOUS at 09:27

## 2022-07-11 RX ADMIN — ENOXAPARIN SODIUM 40 MG: 40 INJECTION SUBCUTANEOUS at 09:34

## 2022-07-11 RX ADMIN — VANCOMYCIN HYDROCHLORIDE 1000 MG: 1 INJECTION, SOLUTION INTRAVENOUS at 22:51

## 2022-07-11 RX ADMIN — PIPERACILLIN AND TAZOBACTAM 3.38 G: 3; .375 INJECTION, POWDER, LYOPHILIZED, FOR SOLUTION INTRAVENOUS at 08:59

## 2022-07-11 RX ADMIN — PIPERACILLIN AND TAZOBACTAM 3.38 G: 3; .375 INJECTION, POWDER, LYOPHILIZED, FOR SOLUTION INTRAVENOUS at 15:55

## 2022-07-11 RX ADMIN — ACETAMINOPHEN 650 MG: 325 TABLET, FILM COATED ORAL at 20:54

## 2022-07-11 RX ADMIN — MONTELUKAST SODIUM 10 MG: 10 TABLET, FILM COATED ORAL at 22:51

## 2022-07-11 RX ADMIN — SODIUM CHLORIDE: 9 INJECTION, SOLUTION INTRAVENOUS at 20:57

## 2022-07-11 RX ADMIN — METOCLOPRAMIDE HYDROCHLORIDE 5 MG: 5 INJECTION INTRAMUSCULAR; INTRAVENOUS at 09:28

## 2022-07-11 ASSESSMENT — ENCOUNTER SYMPTOMS
HEADACHES: 0
FEVER: 1
COUGH: 0
CONFUSION: 0
DYSURIA: 0
ABDOMINAL PAIN: 0
MYALGIAS: 1
VOMITING: 1
DIARRHEA: 0

## 2022-07-11 ASSESSMENT — ACTIVITIES OF DAILY LIVING (ADL): DEPENDENT_IADLS:: INDEPENDENT

## 2022-07-11 NOTE — ED NOTES
Pt noted to desat while asleep. Reports that she does not have a known hx of GURMEET but that she does snore. 4L NC applied while Pt asleep. Will continue to monitor.

## 2022-07-11 NOTE — ED NOTES
"[facility] ED Handoff Report    ED Chief Complaint: Vomiting x 3 days    ED Diagnosis:  (R11.11) Vomiting without nausea, intractability of vomiting not specified, unspecified vomiting type  Comment:   Plan:     (A41.9) Sepsis, due to unspecified organism, unspecified whether acute organ dysfunction present (H)  Comment:   Plan:        PMH:    Past Medical History:   Diagnosis Date     Asthma      Asthma      Breast cancer (H) 2014    left     Breast cancer (H) 2017    right     Breast cancer (H) 2014    left     Breast cancer (H) 2017    right     Bronchitis, chronic (H)      Bronchitis, chronic (H)      Colon polyp 2009    one precancerous polyp     Colon polyp 2009    one precancerous polyp     Cytoxan-induced pulmonary interstitial disease (H)      Cytoxan-induced pulmonary interstitial disease (H)      Disease of thyroid gland      Disease of thyroid gland      GERD (gastroesophageal reflux disease)      GERD (gastroesophageal reflux disease)      Hx antineoplastic chemotherapy 2014     Hx antineoplastic chemotherapy 2014     Hx of radiation therapy 2014     Hx of radiation therapy 2014     Peripheral neuropathy     hands and feet     Peripheral neuropathy     hands and feet     Pneumonia      Pneumonia      Sinusitis      Sinusitis      Uterine polyp      Uterine polyp         Code Status:  No Order     Falls Risk: Yes Band: Applied    Current Living Situation/Residence: lives with a significant other     Elimination Status: Continent: Yes     Activity Level: SBA    Patients Preferred Language:  English     Needed: No    Vital Signs:  BP (!) 162/75   Pulse 99   Temp 98.5  F (36.9  C) (Oral)   Resp 18   Ht 1.549 m (5' 1\")   Wt 77.1 kg (170 lb)   SpO2 99%   BMI 32.12 kg/m       Cardiac Rhythm: n/a    Pain Score: 0/10    Is the Patient Confused:  No    Last Food or Drink: 07/11/22 at 1000    Focused Assessment:  Pt arrived to ED complaining of vomiting x 2-3 days. Pt also reports fevers " at home and joint pain. Pt is covid negative. Lactic and WBC elevated. Unknown infection source. Treating with antibiotics.   Pt has 20 G IV right AC. Flushes without trouble but unable to draw back. Lab unable to get straight stick with multiple attempts by multiple people. PICC pages this morning, have not come yet but not needed urgently. Was going to have second IV placed and have second set IV cultures drawn. MD gave orders to start IV antibiotics without second set IV cultures.   Pt has continued to have dry heaves and vomiting with nausea. Pt states it seems to happen when she is talking or with movement. Pt reports that it feels like her stomach spasms which causes her to vomit. Antiemetics have been unsuccessful in stopping or improving vomiting.  MD placed pt on clear liquid diet to advance as tolerated. Pt has able to keep some clear liquids down  Pt denies pain at this time.    Tests Performed: Done: Labs and Imaging    Treatments Provided:      Family Dynamics/Concerns: No    Family Updated On Visitor Policy: Yes    Plan of Care Communicated to Family: Yes    Who Was Updated about Plan of Care: Pts significant other      Medications sent with patient:     Covid: asymptomatic , negative    Additional Information:     RN: Moise Smith RN   7/11/2022 12:56 PM

## 2022-07-11 NOTE — CONSULTS
Care Management Initial Consult    General Information  Assessment completed with: Patient, Spouse or significant other, pt and , Kyle  Type of CM/SW Visit: Initial Assessment    Primary Care Provider verified and updated as needed: Yes   Readmission within the last 30 days: no previous admission in last 30 days   Return Category: Progression of disease  Reason for Consult: discharge planning  Advance Care Planning: Advance Care Planning Reviewed: verified with patient, no concerns identified     General Information Comments: lives w/spouse and independent at baseline and no svcs    Communication Assessment  Patient's communication style: spoken language (English or Bilingual)             Cognitive  Cognitive/Neuro/Behavioral:                        Living Environment:   People in home: spouse     Current living Arrangements: house      Able to return to prior arrangements: yes       Family/Social Support:  Care provided by: self  Provides care for: no one  Marital Status:             Description of Support System: Supportive, Involved    Support Assessment: Adequate family and caregiver support, Adequate social supports    Current Resources:   Patient receiving home care services: No     Community Resources: None  Equipment currently used at home: none  Supplies currently used at home: None    Employment/Financial:  Employment Status:          Financial Concerns: No concerns identified   Referral to Financial Worker: No       Lifestyle & Psychosocial Needs:  Social Determinants of Health     Tobacco Use: Low Risk      Smoking Tobacco Use: Never Smoker     Smokeless Tobacco Use: Never Used   Alcohol Use: Not on file   Financial Resource Strain: Not on file   Food Insecurity: Not on file   Transportation Needs: Not on file   Physical Activity: Not on file   Stress: Not on file   Social Connections: Not on file   Intimate Partner Violence: Not on file   Depression: Not on file   Housing Stability:  Not on file       Functional Status:  Prior to admission patient needed assistance:   Dependent ADLs:: Independent  Dependent IADLs:: Independent  Assesssment of Functional Status: Not at baseline with ADL Functioning    Mental Health Status:  Mental Health Status: No Current Concerns       Chemical Dependency Status:                Values/Beliefs:  Spiritual, Cultural Beliefs, [employer] Practices, Values that affect care:                 Additional Information:  CLEMENTE assessed, lives w/spouse and independent at baseline and no svcs. Discussed MOON. Spouse to transport.      Brittany Phan RN

## 2022-07-11 NOTE — ED TRIAGE NOTES
Pt has been vomiting since Friday.  Today, she has vomited about 8-10 times. Is sweaty.  Has had joint pain in her arms and has back pain that goes away when she has tylenol. Last had tylenol at 1900. Denies any abd pain. No diarrhea.

## 2022-07-11 NOTE — ED NOTES
Pt has been running fever 100.1 to 101F since yesterday noon. Pt took tylenol at 1900 this evening.

## 2022-07-11 NOTE — PHARMACY-VANCOMYCIN DOSING SERVICE
"Pharmacy Vancomycin Initial Note  Date of Service 2022  Patient's  1955  67 year old, female    Indication: Sepsis    Current estimated CrCl = Estimated Creatinine Clearance: 69.3 mL/min (based on SCr of 0.74 mg/dL).    Creatinine for last 3 days  7/10/2022: 10:33 PM Creatinine 0.83 mg/dL  2022:  5:01 AM Creatinine 0.74 mg/dL    Recent Vancomycin Level(s) for last 3 days  No results found for requested labs within last 72 hours.      Vancomycin IV Administrations (past 72 hours)                   vancomycin (VANCOCIN) 1,750 mg in sodium chloride 0.9 % 500 mL intermittent infusion (mg) 1,750 mg Given 22 1007                Nephrotoxins and other renal medications (From now, onward)    Start     Dose/Rate Route Frequency Ordered Stop    22 2200  vancomycin (VANCOCIN) 1000 mg in dextrose 5% 200 mL PREMIX         1,000 mg  200 mL/hr over 1 Hours Intravenous EVERY 12 HOURS 22 1032      22 1500  piperacillin-tazobactam (ZOSYN) 3.375 g vial to attach to  mL bag        Note to Pharmacy: For [facility], [facility] and [facility]: For Zosyn-naive patients, use the \"Zosyn initial dose + extended infusion\" order panel.    3.375 g  over 240 Minutes Intravenous EVERY 8 HOURS 22 0839      22 0838  ketorolac (TORADOL) injection 15 mg         15 mg Intravenous EVERY 6 HOURS PRN 22 0838 22 0837    22 0600  vancomycin (VANCOCIN) 1,750 mg in sodium chloride 0.9 % 500 mL intermittent infusion         1,750 mg  over 2 Hours Intravenous ONCE 22 0534            Contrast Orders - past 72 hours (72h ago, onward)    Start     Dose/Rate Route Frequency Stop    22 0400  iopamidol (ISOVUE-370) solution 75 mL         75 mL Intravenous ONCE 22 0412          [employer] Prediction of Planned Initial Vancomycin Regimen  Regimen: 1000 mg IV every 12 hours.  Start time: 11:30 on 2022  Exposure target: AUC24 (range)400-600 mg/L.hr   AUC24,ss: 551 mg/L.hr  Probability of " AUC24 > 400: 81 %  Ctrough,ss: 17.7 mg/L  Probability of Ctrough,ss > 20: 39 %  Probability of nephrotoxicity (Lodise NANNETTE 2009): 14 %        Plan:  1. Start vancomycin  1000 mg IV q12h.   2. Vancomycin monitoring method: AUC  3. Vancomycin therapeutic monitoring goal: 400-600 mg*h/L  4. Pharmacy will check vancomycin levels as appropriate in 1-3 Days.    5. Serum creatinine levels will be ordered daily for the first week of therapy and at least twice weekly for subsequent weeks.      Uriah Torres [facility]

## 2022-07-11 NOTE — ED PROVIDER NOTES
EMERGENCY DEPARTMENT ENCOUNTER      NAME: Willa Hernandez  AGE: 67 year old female  YOB: 1955  MRN: [id number]  EVALUATION DATE & TIME: 7/11/2022  1:45 AM    PCP: [facility]    ED PROVIDER: Robert Soto MD        Chief Complaint   Patient presents with     Vomiting         FINAL IMPRESSION:  1. Vomiting without nausea, intractability of vomiting not specified, unspecified vomiting type    2. Sepsis, due to unspecified organism, unspecified whether acute organ dysfunction present (H)          ED COURSE & MEDICAL DECISION MAKING:    Pertinent Labs & Imaging studies reviewed. (See chart for details)  67 year old female presents to the Emergency Department for evaluation of vomiting.  3:35 AM I introduced myself to the patient, obtained patient history, performed a physical exam, and discussed plan for ED workup including potential diagnostic laboratory/imaging studies and interventions. I wore goggles and N95.    Patient presents with fever, vomiting, myalgias, arthralgias since Friday.  Negative home COVID test.  No known tick bites.  No rashes.  No dysuria.  No cough.    Differential including viral process, tickborne illness, sepsis, UTI, bowel obstruction, appendicitis, biliary colic, pneumonia, foodborne illness    No diarrhea or bloody stools    History of cancer but not currently on any type of immunosuppression    Given IV fluids and Reglan since she has Zofran allergy in addition to Toradol    Plan for labs, CT imaging of abdomen, tickborne illness screen, chest x-ray, UA    CT of abdomen does not show evidence of appendicitis or bowel obstruction or colitis.  Chest x-ray with evidence of pneumonia.  Patient does have elevated white white blood cell count and neutrophils.  No dysuria or urinary frequency therefore UTI is less likely but UA is pending.    With arthralgias and myalgias in the setting of fever developed in [place] concern for possible tickborne  illness    Tickborne labs were sent    Nursing says are having difficulty getting lactic acid is waiting for lab to come down and draw.  Patient been in the ER for 8 hours this point therefore plan for admission for observation since patient has vomiting and fever and elevated white blood cell count.  Blood cultures been sent      No current source of infection.  Exam and history without evidence of abscess or cellulitis    COVID-19 and influenza negative.     Like acid came back elevated despite IV fluids in the setting of fever and elevated white blood cell count concern for sepsis.  Broad-spectrum antibiotics initiated    Second liter IV fluids ordered.       Spoke with Dr. Alexander who agreed to admit patient to observation                ED Course as of 07/11/22 0524   Mon Jul 11, 2022   0432 Labs notable for increased white blood cell counts with increased neutrophils   0434 Lactic acid ordered but has not been collected by nursing yet   0434 Blood culture sent.   0434 Lipase normal.  LFTs notable for increased protein   0434 Troponin normal   0523 Lactic Acid(!): 2.5  despite 1 L IV fluids   0524 Second liter ordered broad-spectrum antibiotics ordered urine culture pending       At the conclusion of the encounter I discussed the results of all of the tests and the disposition. The questions were answered. The patient or family acknowledged understanding and was agreeable with the care plan.           MEDICATIONS GIVEN IN THE EMERGENCY:  Medications   prochlorperazine (COMPAZINE) injection 5 mg (5 mg Intravenous Given 7/11/22 0523)   acetaminophen (TYLENOL) tablet 650 mg (has no administration in time range)   piperacillin-tazobactam (ZOSYN) 3.375 g vial to attach to  mL bag (has no administration in time range)   metoclopramide (REGLAN) injection 10 mg (10 mg Intravenous Given 7/11/22 0254)   0.9% sodium chloride BOLUS (0 mLs Intravenous Stopped 7/11/22 0451)   ketorolac (TORADOL) injection 15 mg (15 mg  "Intravenous Given 7/11/22 0321)   iopamidol (ISOVUE-370) solution 75 mL (75 mLs Intravenous Given 7/11/22 7542)   0.9% sodium chloride BOLUS (1,000 mLs Intravenous New Bag 7/11/22 8107)       NEW PRESCRIPTIONS STARTED AT TODAY'S ER VISIT  New Prescriptions    No medications on file          =================================================================    HPI    Triage note  \"Pt has been vomiting since Friday.  Today, she has vomited about 8-10 times. Is sweaty.  Has had joint pain in her arms and has back pain that goes away when she has tylenol. Last had tylenol at 1900. Denies any abd pain. No diarrhea.      \"      Patient information was obtained from: Patient     Use of : N/A         Willa JOYNER Alexander Hernandez is a 67 year old female with a pertinent history of asthma, breast cancer, GERD, and arthritis who presents to this ED by walk in for evaluation of vomiting.      Patient has been vomiting since Friday. Her bile is gold colored. [place]22 when she started feeling hot. On Saturday, she had a chafing sensation under her armpits that radiated to her hands. She also had joint pain during that time. Patient says joints are not swollen, but they ache. Patient denies deer ticks but may have been in contact with wood ticks. Patient denies lyme disease. Patient reports a fever of 101 today. Her joint pain gets worse when she has a fever. She describes it as worse arthritis. Patient took a at home Covid test yesterday that was negative. Patient denies cough, chest pain, abdominal pain, rashes, and dysuria. Patient took tylenol and singulair at 7:00 PM. Patient denies that anyone else around her is sick.       REVIEW OF SYSTEMS   Review of Systems   Constitutional: Positive for fever.   HENT: Positive for drooling.    Respiratory: Negative for cough.    Cardiovascular: Negative for chest pain.   Gastrointestinal: Positive for vomiting. Negative for abdominal pain and diarrhea. "   Genitourinary: Negative for dysuria.   Musculoskeletal: Positive for myalgias.   Skin: Negative for rash.   Allergic/Immunologic: Negative for immunocompromised state.   Neurological: Negative for headaches.   Psychiatric/Behavioral: Negative for confusion.     PAST MEDICAL HISTORY:  Past Medical History:   Diagnosis Date     Asthma      Asthma      Breast cancer (H) 2014    left     Breast cancer (H) 2017    right     Breast cancer (H) 2014    left     Breast cancer (H) 2017    right     Bronchitis, chronic (H)      Bronchitis, chronic (H)      Colon polyp 2009    one precancerous polyp     Colon polyp 2009    one precancerous polyp     Cytoxan-induced pulmonary interstitial disease (H)      Cytoxan-induced pulmonary interstitial disease (H)      Disease of thyroid gland      Disease of thyroid gland      GERD (gastroesophageal reflux disease)      GERD (gastroesophageal reflux disease)      Hx antineoplastic chemotherapy 2014     Hx antineoplastic chemotherapy 2014     Hx of radiation therapy 2014     Hx of radiation therapy 2014     Peripheral neuropathy     hands and feet     Peripheral neuropathy     hands and feet     Pneumonia      Pneumonia      Sinusitis      Sinusitis      Uterine polyp      Uterine polyp        PAST SURGICAL HISTORY:  Past Surgical History:   Procedure Laterality Date     BIOPSY BREAST Right 2/12/2018    Procedure: RIGHT BREAST LUMPECTOMY WIRE LOCALIZATION  RIGHT SENTINEL LYMPH NODE BIOPSY AND PORT REMOVAL;  Surgeon: Veronica Gan MD;  Location: [facility] - [place], [place];  Service:      BIOPSY BREAST Left 2014     BREAST EXCISIONAL BIOPSY Left     years ago     COLONOSCOPY       FOOT ARTHRODESIS, MODIFIED CHAVEZ  may 2013 and oct 2013    hard to get hardware removed due to allergy to metal      FOOT SURGERY Right 2013     LAPAROSCOPIC HYSTERECTOMY TOTAL Bilateral 11/14/2018    Procedure: TOTAL LAPAROSCOPIC HYSTERECTOMY, BILATERAL SALPINGO-OOPHORECTOMY;  Surgeon: Avni Deng MD;  Location:  [facility] Main OR;  Service: Gynecology     LUMPECTOMY BREAST Left 2014     LUMPECTOMY BREAST Right 2018     OTHER SURGICAL HISTORY      port a cath     POLYPECTOMY  2009    Uterine polyp removed      AK RMVL DAREK CTR VAD W/SUBQ PORT/ CTR/PRPH INSJ N/A 2/12/2018    Procedure: PORT REMOVAL;  Surgeon: Veronica Gan MD;  Location: [facility] [place];  Service: General           CURRENT MEDICATIONS:    albuterol (PROAIR HFA;PROVENTIL HFA;VENTOLIN HFA) 90 mcg/actuation inhaler  calcium carb/vit D3/minerals (CALCIUM-VITAMIN D ORAL)  fexofenadine (ALLEGRA) 180 MG tablet  montelukast (SINGULAIR) 10 mg tablet  multivitamin therapeutic (THERAGRAN) tablet  mv-mn/iron/folic acid/herb 190 (VITAMIN D3 COMPLETE ORAL)  pantoprazole (PROTONIX) 20 MG tablet  vitamin B-12 (CYANOCOBALAMIN) 100 MCG tablet  zinc 50 mg Tab        ALLERGIES:  Allergies   Allergen Reactions     Codeine Nausea     Cortisone (Hydrocortisone) [Hydrocortisone] Hives     Injection     Cyclophosphamide Unknown     Latex Other (See Comments)     intolerance     Nystatin Unknown     Other Environmental Allergy Unknown     Seasonal Allergies, Mold      Pulmicort [Budesonide] Hives     Nickel Rash     Other Drug Allergy (See Comments) Rash     White Gold     Triamcinolone Rash     Zantac [Ranitidine] Nausea and Vomiting     Zofran (As Hydrochloride) [Ondansetron] Nausea and Vomiting       FAMILY HISTORY:  Family History   Problem Relation Age of Onset     Ovarian Cancer Maternal Grandmother 60.00        biopsy showed adenocarcinoma, suspected ovarian primary     Colon Cancer Maternal Grandfather 65.00     Breast Cancer Cousin 50.00        maternal first-cousin, triple-negative     Hypertension Mother      Dementia Mother      Diabetes Father      Hypertension Father      Pneumonia Father      Hypertension Sister      Breast Cancer Sister 69.00     Uterine Cancer Sister 69.00     Diabetes Brother      Hypertension Brother      Hypertension Paternal Grandmother   "    Cerebrovascular Disease Paternal Grandmother      Stomach Cancer Paternal Grandfather 70.00     Hypertension Brother      Diabetes Brother      Hypertension Sister      Colon Cancer Maternal Aunt 75.00     Prostate Cancer Maternal Uncle      Diabetes Brother      Colon Polyps Brother         starting in his 20s     Hypertension Son      Asthma Son      Thyroid Cancer Cousin 57.00        maternal first-cousin, Hurthle cell     Cancer Other         \"female\" cancers among [place] to [facility] but specifics not known     Skin Cancer Cousin         paternal first-cousin     Cancer Cousin         paternal first-cousin, unknown type     Uterine Cancer Other 50.00        daughter to paternal first-cousin     Cancer Cousin         paternal first-cousin, head/neck       SOCIAL HISTORY:   Social History     Socioeconomic History     Marital status:    Tobacco Use     Smoking status: Never Smoker     Smokeless tobacco: Never Used   Substance and Sexual Activity     Alcohol use: Yes     Comment: Alcoholic Drinks/day: maybe 1 a month     Drug use: No     Sexual activity: Never       VITALS:  /64   Pulse 94   Temp 98.5  F (36.9  C) (Oral)   Resp 12   Ht 1.549 m (5' 1\")   Wt 77.1 kg (170 lb)   SpO2 97%   BMI 32.12 kg/m      PHYSICAL EXAM      Vitals: /64   Pulse 94   Temp 98.5  F (36.9  C) (Oral)   Resp 12   Ht 1.549 m (5' 1\")   Wt 77.1 kg (170 lb)   SpO2 97%   BMI 32.12 kg/m    General: Appears in no acute distress, awake, alert, interactive. Non tremless.  Eyes: Conjunctivae non-injected. Sclera anicteric.  HENT: Atraumatic.  Neck: Supple.  Respiratory/Chest: Respiration unlabored.  No wheezing or crackles  Abdomen: Soft non tender.  No tenderness or guarding or rigidity  Musculoskeletal: Normal extremities. No edema or erythema.  Skin: Normal color. No rash or diaphoresis.  Neurologic: Face symmetric, moves all extremities spontaneously. Speech clear.  Tremulous  Psychiatric: Oriented to person, " place, and time. Affect appropriate.       LAB:  All pertinent labs reviewed and interpreted.  Results for orders placed or performed during the hospital encounter of 07/11/22   XR Chest Port 1 View    Impression    IMPRESSION: Cardiac silhouette mildly enlarged. Mildly tortuous thoracic aorta. Minimal atelectasis or less likely infiltrate at the left lateral lung base. Right lung is clear. No pneumothorax.   CT Abdomen Pelvis w Contrast    Impression    IMPRESSION:   1.  Scattered colonic diverticulosis without evidence of diverticulitis.   2.  Normal appendix   Basic metabolic panel   Result Value Ref Range    Sodium 136 136 - 145 mmol/L    Potassium 3.9 3.5 - 5.0 mmol/L    Chloride 101 98 - 107 mmol/L    Carbon Dioxide (CO2) 27 22 - 31 mmol/L    Anion Gap 8 5 - 18 mmol/L    Urea Nitrogen 13 8 - 22 mg/dL    Creatinine 0.83 0.60 - 1.10 mg/dL    Calcium 9.6 8.5 - 10.5 mg/dL    Glucose 163 (H) 70 - 125 mg/dL    GFR Estimate 77 >60 mL/min/1.73m2   Troponin I (now)   Result Value Ref Range    Troponin I <0.01 0.00 - 0.29 ng/mL   Hepatic function panel   Result Value Ref Range    Bilirubin Total 0.5 0.0 - 1.0 mg/dL    Bilirubin Direct 0.2 <=0.5 mg/dL    Protein Total 8.7 (H) 6.0 - 8.0 g/dL    Albumin 3.8 3.5 - 5.0 g/dL    Alkaline Phosphatase 104 45 - 120 U/L    AST 27 0 - 40 U/L    ALT 13 0 - 45 U/L   Result Value Ref Range    Lipase 25 0 - 52 U/L   Result Value Ref Range    Magnesium 2.2 1.8 - 2.6 mg/dL   Extra Blue Top Tube   Result Value Ref Range    Hold Specimen JIC    Extra Red Top Tube   Result Value Ref Range    Hold Specimen JIC    Extra Green Top (Lithium Heparin) Tube   Result Value Ref Range    Hold Specimen JIC    Extra Purple Top Tube   Result Value Ref Range    Hold Specimen JIC    CBC with platelets and differential   Result Value Ref Range    WBC Count 15.3 (H) 4.0 - 11.0 10e3/uL    RBC Count 5.65 (H) 3.80 - 5.20 10e6/uL    Hemoglobin 15.5 11.7 - 15.7 g/dL    Hematocrit 49.0 (H) 35.0 - 47.0 %    MCV 87  78 - 100 fL    MCH 27.4 26.5 - 33.0 pg    MCHC 31.6 31.5 - 36.5 g/dL    RDW 15.4 (H) 10.0 - 15.0 %    Platelet Count 353 150 - 450 10e3/uL    % Neutrophils 84 %    % Lymphocytes 11 %    % Monocytes 4 %    % Eosinophils 0 %    % Basophils 0 %    % Immature Granulocytes 1 %    NRBCs per 100 WBC 0 <1 /100    Absolute Neutrophils 13.1 (H) 1.6 - 8.3 10e3/uL    Absolute Lymphocytes 1.7 0.8 - 5.3 10e3/uL    Absolute Monocytes 0.6 0.0 - 1.3 10e3/uL    Absolute Eosinophils 0.0 0.0 - 0.7 10e3/uL    Absolute Basophils 0.0 0.0 - 0.2 10e3/uL    Absolute Immature Granulocytes 0.1 <=0.4 10e3/uL    Absolute NRBCs 0.0 10e3/uL   Extra Blood Culture Bottle   Result Value Ref Range    Hold Specimen JIC    Extra Green Top (Lithium Heparin) ON ICE   Result Value Ref Range    Hold Specimen JIC    Glucose by meter   Result Value Ref Range    GLUCOSE BY METER POCT 167 (H) 70 - 99 mg/dL   Lactic acid whole blood   Result Value Ref Range    Lactic Acid 2.5 (H) 0.7 - 2.0 mmol/L   Symptomatic; Unknown Influenza A/B & SARS-CoV2 (COVID-19) Virus PCR Multiplex Nasopharyngeal    Specimen: Nasopharyngeal; Swab   Result Value Ref Range    Influenza A PCR Negative Negative    Influenza B PCR Negative Negative    SARS CoV2 PCR Negative Negative       RADIOLOGY:  Reviewed all pertinent imaging. Please see official radiology report.  XR Chest Port 1 View   Final Result   IMPRESSION: Cardiac silhouette mildly enlarged. Mildly tortuous thoracic aorta. Minimal atelectasis or less likely infiltrate at the left lateral lung base. Right lung is clear. No pneumothorax.      CT Abdomen Pelvis w Contrast   Final Result   IMPRESSION:    1.  Scattered colonic diverticulosis without evidence of diverticulitis.    2.  Normal appendix        EKG:    10 Jul 2022 [id number]    Sinus rhythm, low voltage QRS  No ST changes  HR 94  ME interval 120  QRS 66    Axis 30  No significant change when compared to 20 October 2017      Adrian MALLORY, am serving as a scribe to  document services personally performed by Ac Soto MD based on my observation and the provider's statements to me. I, Dr. Ac Soto, attest that Adrian Rojo is acting in a scribe capacity, has observed my performance of the services and has documented them in accordance with my direction.    Ac Soto MD  Emergency Medicine  [facility] EMERGENCY DEPARTMENT  [address] [place] [id number]  [phone]       Ac Soto MD  07/11/22 0522

## 2022-07-11 NOTE — PHARMACY-ADMISSION MEDICATION HISTORY
Pharmacy Note - Admission Medication History    Pertinent Provider Information: nausea and vomiting lately- trouble taking some meds.     ______________________________________________________________________    Prior To Admission (PTA) med list completed and updated in EMR.       PTA Med List   Medication Sig Last Dose     acetaminophen (TYLENOL) 325 MG tablet Take 650 mg by mouth every 6 hours as needed for mild pain 7/10/2022     albuterol (PROAIR HFA;PROVENTIL HFA;VENTOLIN HFA) 90 mcg/actuation inhaler [ALBUTEROL (PROAIR HFA;PROVENTIL HFA;VENTOLIN HFA) 90 MCG/ACTUATION INHALER] Inhale 2 puffs every 4 (four) hours as needed for wheezing. Past Week at Unknown time     fexofenadine (ALLEGRA) 180 MG tablet [FEXOFENADINE (ALLEGRA) 180 MG TABLET] Take 180 mg by mouth daily. 7/7/2022     montelukast (SINGULAIR) 10 mg tablet [MONTELUKAST (SINGULAIR) 10 MG TABLET] Take 10 mg by mouth bedtime. 7/10/2022 at pm     multivitamin therapeutic (THERAGRAN) tablet [MULTIVITAMIN THERAPEUTIC (THERAGRAN) TABLET] Take 1 tablet by mouth every evening.  7/7/2022     pantoprazole (PROTONIX) 20 MG tablet [PANTOPRAZOLE (PROTONIX) 20 MG TABLET] take 1 tablet by mouth once daily to decrease stomach acid 7/10/2022 at am     vitamin B-12 (CYANOCOBALAMIN) 1000 MCG tablet Take 1,000 mcg by mouth daily 7/7/2022       Information source(s): Patient, Clinic records and [facility]  Method of interview communication: in-person    Summary of Changes to PTA Med List  New: none  Discontinued: sulfa/pred ear drops(finished 10day 7/1/22), zinc, vit d, calcium  Changed: none    Patient was asked about OTC/herbal products specifically.  PTA med list reflects this.    In the past week, patient estimated taking medication this percent of the time:  50-90% due to illness.    Allergies were reviewed, assessed, and updated with the patient.      Patient did not bring any medications to the hospital and can't retrieve from home. No multi-dose  medications are available for use during hospital stay.     The information provided in this note is only as accurate as the sources available at the time of the update(s).    Thank you for the opportunity to participate in the care of this patient.    Nomi Clarke [facility]  7/11/2022 7:57 AM

## 2022-07-11 NOTE — H&P
Admission History and Physical   Willa Hernandez,  1955, MRN [id number]    [facility]  Vomiting [R11.10]    PCP: [place], [facility] [address]  [facility] [address], [phone]   Code status:  Full code       Extended Emergency Contact Information  Primary Emergency Contact: Kyle Hernandez   [facility]  Home Phone: [phone]  Mobile Phone: [phone]  Relation: Spouse       Assessment and Plan   67 year old old female with past medical history of bilateral breast cancer, chemotherapy-induced interstitial lung disease and peripheral neuropathy, asthma, seasonal allergies, obesity who presented for evaluation of fever, myalgias and multiple episodes of emesis.  Labs significant for leukocytosis with left shift, elevated lactic acid but no clear infection source    1.   Fever, myalgias, multiple episodes of emesis.  Concerning for possible tickborne illness.  Labs for Lyme disease, Ehrlichia, Babesia, Anaplasma are pending.   Start empiric doxycycline.  Supportive management with as needed antiemetics, Tylenol, IV fluids.  Advance diet as tolerated  2.  Leukocytosis with neutrophil predominance, lactic acidosis.  Continue empiric antibiotics Zosyn and vancomycin for possible sepsis although less likely.  Follow-up blood culture results  3.  Suspected GURMEET.  Recommend outpatient sleep study.  Supplemental O2 to keep O2 sats above 94%  4.  Seasonal allergies.  Resume PTA medications  5.  GERD on PPI  6.  History of bilateral breast cancer  7.  Obesity Body mass index is 32.12 kg/m .    DVT Prophylaxis: SQ Lovenox     Chief Complaint:  Vomiting, fevers, chills     HPI:    Willa Hernandez is a 67 year old old female with past medical history of bilateral breast cancer, chemotherapy-induced interstitial lung disease and peripheral neuropathy, asthma, seasonal allergies, obesity who presented for evaluation of fever, nausea and emesis  History is  provided by patient and medical records  Patient reports sudden onset of illness 3 days ago when she started having multiple episodes of nonbloody emesis with no significant nausea.  She reports fever up to 101, chills and rigors, significant muscle aches starting in the armpits down her both arms.  No headache, chest pain, cough, abdominal pain, diarrhea.  [place] her symptoms start, however she has not noted any tick bites or rashes.  Grandchildren were sick with sinus infections last week.    On initial evaluation in the ER patient was afebrile, hypertensive 173/85, tachycardic 100.  Labs significant for leukocytosis 15.3 with left shift, elevated lactic acid 2.5 even after 2 L fluid bolus, unremarkable CMP except for elevated total protein 8.7, normal lipase.  UA not suggestive of UTI.  CT abdomen and pelvis with scattered diverticuli but no evidence of diverticulitis.  Chest x-ray with minimal atelectasis left lower lung.  Of note while in the ER patient is noted to desat while asleep requiring 4 L nasal cannula       Medical History  Past Medical History:   Diagnosis Date     Asthma      Asthma      Breast cancer (H) 2014    left     Breast cancer (H) 2017    right     Breast cancer (H) 2014    left     Breast cancer (H) 2017    right     Bronchitis, chronic (H)      Bronchitis, chronic (H)      Colon polyp 2009    one precancerous polyp     Colon polyp 2009    one precancerous polyp     Cytoxan-induced pulmonary interstitial disease (H)      Cytoxan-induced pulmonary interstitial disease (H)      Disease of thyroid gland      Disease of thyroid gland      GERD (gastroesophageal reflux disease)      GERD (gastroesophageal reflux disease)      Hx antineoplastic chemotherapy 2014     Hx antineoplastic chemotherapy 2014     Hx of radiation therapy 2014     Hx of radiation therapy 2014     Peripheral neuropathy     hands and feet     Peripheral neuropathy     hands and feet     Pneumonia       Pneumonia      Sinusitis      Sinusitis      Uterine polyp      Uterine polyp         Surgical History  She  has a past surgical history that includes Foot Arthrodesis, Modified [place] (may 2013 and oct 2013); Polypectomy (2009); Foot surgery (Right, 2013); Colonoscopy; other surgical history; Pr Rmvl [facility] Vad W/Subq Port/ Ctr/Prph Insj (N/A, 2/12/2018); Lumpectomy breast (Left, 2014); Lumpectomy breast (Right, 2018); Biopsy breast (Right, 2/12/2018); Biopsy breast (Left, 2014); Breast Excisional Biopsy (Left); and Laparoscopic hysterectomy total (Bilateral, 11/14/2018).       Social History  Reviewed, and she  reports that she has never smoked. She has never used smokeless tobacco. She reports current alcohol use. She reports that she does not use drugs.   Social History     Tobacco Use     Smoking status: Never Smoker     Smokeless tobacco: Never Used   Substance Use Topics     Alcohol use: Yes     Comment: Alcoholic Drinks/day: maybe 1 a month          Allergies  Allergies   Allergen Reactions     Codeine Nausea     Cortisone (Hydrocortisone) [Hydrocortisone] Hives     Injection     Cyclophosphamide Unknown     Latex Other (See Comments)     intolerance     Nystatin Unknown     Other Environmental Allergy Unknown     Seasonal Allergies, Mold      Pulmicort [Budesonide] Hives     Nickel Rash     Other Drug Allergy (See Comments) Rash     White Gold     Triamcinolone Rash     Zantac [Ranitidine] Nausea and Vomiting     Zofran (As Hydrochloride) [Ondansetron] Nausea and Vomiting    Family History  Reviewed, and   Family History   Problem Relation Age of Onset     Ovarian Cancer Maternal Grandmother 60.00        biopsy showed adenocarcinoma, suspected ovarian primary     Colon Cancer Maternal Grandfather 65.00     Breast Cancer Cousin 50.00        maternal first-cousin, triple-negative     Hypertension Mother      Dementia Mother      Diabetes Father      Hypertension Father      Pneumonia Father       "Hypertension Sister      Breast Cancer Sister 69.00     Uterine Cancer Sister 69.00     Diabetes Brother      Hypertension Brother      Hypertension Paternal Grandmother      Cerebrovascular Disease Paternal Grandmother      Stomach Cancer Paternal Grandfather 70.00     Hypertension Brother      Diabetes Brother      Hypertension Sister      Colon Cancer Maternal Aunt 75.00     Prostate Cancer Maternal Uncle      Diabetes Brother      Colon Polyps Brother         starting in his 20s     Hypertension Son      Asthma Son      Thyroid Cancer Cousin 57.00        maternal first-cousin, Hurthle cell     Cancer Other         \"female\" cancers among relatives to [facility] [place] [place] but specifics not known     Skin Cancer Cousin         paternal first-cousin     Cancer Cousin         paternal first-cousin, unknown type     Uterine Cancer Other 50.00        daughter to paternal first-cousin     Cancer Cousin         paternal first-cousin, head/neck             Prior to Admission Medications   (Not in a hospital admission)         Review of Systems:  A 12 point comprehensive review of systems was negative except as noted. Physical Exam:  Temp:  [98  F (36.7  C)-98.5  F (36.9  C)] 98.5  F (36.9  C)  Pulse:  [] 98  Resp:  [12-18] 18  BP: (134-179)/() 144/80  SpO2:  [97 %-100 %] 99 %    BP (!) 144/80   Pulse 98   Temp 98.5  F (36.9  C) (Oral)   Resp 18   Ht 1.549 m (5' 1\")   Wt 77.1 kg (170 lb)   SpO2 99%   BMI 32.12 kg/m      General Appearance:   Uncomfortable, constantly retching, obese female   Head:    Normocephalic, without obvious abnormality, atraumatic   Eyes:    PERRL, conjunctiva/corneas clear, EOM's intact,both eyes    Ears:    Normal external ear canals no drainage or erythema bilat.   Nose:   Nares normal by gross inspection,  mucosa normal, no drainage or sinus tenderness   Throat:   Lips, mucosa, and tongue normal; teeth and gums normal   Neck:   Supple, symmetrical, trachea midline, no adenopathy;        " thyroid:  No enlargement/tenderness/nodules   Back:     Symmetric, no curvature, ROM normal, no CVA tenderness   Lungs:    Clear   Chest wall:    No tenderness or deformity   Heart:    Regular rate and rhythm, S1 and S2 normal, no murmur, no rubs, no JVD, no edema   Abdomen:     Soft, non-tender, bowel sounds active all four quadrants,     no masses, no hepatosplenomegaly   Musculoskeletal:   Extremities are warm and non-tender, atraumatic, no joint swelling or tenderness   Pulses:   2+ and symmetric all extremities   Skin:  no rashes or lesions on exposed areas, please see nursing assessment for full skin assessment   Neurologic:  Awake, alert and oriented x3.  Grossly nonfocal        Pertinent Labs  Lab Results: personally reviewed.   Recent Labs   Lab 07/10/22  2233      CO2 27   BUN 13   ALBUMIN 3.8   ALKPHOS 104   ALT 13   AST 27     Recent Labs   Lab 07/11/22  0501 07/10/22  2233   WBC 16.2* 15.3*   HGB 14.4 15.5   HCT 46.0 49.0*    353     Recent Labs   Lab 07/10/22  2233   TROPONINI <0.01       MOST RECENT A1c, Iron, TIBC, Coags, TFTs  No results found for: HGBA1C, INR, PTT  No results found for: IRON  Lab Results   Component Value Date    TSH 1.12 10/26/2021         Pertinent Radiology  Radiology Results: I personally reviewed.  Results for orders placed or performed during the hospital encounter of 07/11/22   XR Chest Port 1 View    Impression    IMPRESSION: Cardiac silhouette mildly enlarged. Mildly tortuous thoracic aorta. Minimal atelectasis or less likely infiltrate at the left lateral lung base. Right lung is clear. No pneumothorax.   CT Abdomen Pelvis w Contrast    Impression    IMPRESSION:   1.  Scattered colonic diverticulosis without evidence of diverticulitis.   2.  Normal appendix         Advanced Care Planning  Treatment and discharge Planning discussed with patient and RN   Anticipated Length of Stay in midnights (including a midnight in the Emergency Department after triage if  applicable): 2 MN for evaluation and treatment of acute febrile illness, intractable nausea and vomiting       Arabella Jacobs MD  Internal Medicine Hospitalist  7/11/2022

## 2022-07-12 PROBLEM — A41.9 SEPSIS, DUE TO UNSPECIFIED ORGANISM, UNSPECIFIED WHETHER ACUTE ORGAN DYSFUNCTION PRESENT (H): Status: ACTIVE | Noted: 2022-07-12

## 2022-07-12 PROBLEM — R11.11 VOMITING WITHOUT NAUSEA, INTRACTABILITY OF VOMITING NOT SPECIFIED, UNSPECIFIED VOMITING TYPE: Status: ACTIVE | Noted: 2022-07-12

## 2022-07-12 LAB
ALBUMIN SERPL-MCNC: 3.1 G/DL (ref 3.5–5)
ALP SERPL-CCNC: 76 U/L (ref 45–120)
ALT SERPL W P-5'-P-CCNC: 11 U/L (ref 0–45)
ANION GAP SERPL CALCULATED.3IONS-SCNC: 9 MMOL/L (ref 5–18)
AST SERPL W P-5'-P-CCNC: 33 U/L (ref 0–40)
BILIRUB SERPL-MCNC: 0.4 MG/DL (ref 0–1)
BUN SERPL-MCNC: 10 MG/DL (ref 8–22)
CALCIUM SERPL-MCNC: 8.3 MG/DL (ref 8.5–10.5)
CHLORIDE BLD-SCNC: 106 MMOL/L (ref 98–107)
CO2 SERPL-SCNC: 23 MMOL/L (ref 22–31)
CREAT SERPL-MCNC: 0.81 MG/DL (ref 0.6–1.1)
ERYTHROCYTE [DISTWIDTH] IN BLOOD BY AUTOMATED COUNT: 15.8 % (ref 10–15)
GFR SERPL CREATININE-BSD FRML MDRD: 79 ML/MIN/1.73M2
GLUCOSE BLD-MCNC: 88 MG/DL (ref 70–125)
HCT VFR BLD AUTO: 39.8 % (ref 35–47)
HGB BLD-MCNC: 12.5 G/DL (ref 11.7–15.7)
MCH RBC QN AUTO: 27.4 PG (ref 26.5–33)
MCHC RBC AUTO-ENTMCNC: 31.4 G/DL (ref 31.5–36.5)
MCV RBC AUTO: 87 FL (ref 78–100)
PLATELET # BLD AUTO: 283 10E3/UL (ref 150–450)
POTASSIUM BLD-SCNC: 3.5 MMOL/L (ref 3.5–5)
PROT SERPL-MCNC: 6.9 G/DL (ref 6–8)
RBC # BLD AUTO: 4.57 10E6/UL (ref 3.8–5.2)
SODIUM SERPL-SCNC: 138 MMOL/L (ref 136–145)
WBC # BLD AUTO: 12.2 10E3/UL (ref 4–11)

## 2022-07-12 PROCEDURE — 96366 THER/PROPH/DIAG IV INF ADDON: CPT

## 2022-07-12 PROCEDURE — 99232 SBSQ HOSP IP/OBS MODERATE 35: CPT | Performed by: INTERNAL MEDICINE

## 2022-07-12 PROCEDURE — 250N000011 HC RX IP 250 OP 636: Performed by: INTERNAL MEDICINE

## 2022-07-12 PROCEDURE — 250N000013 HC RX MED GY IP 250 OP 250 PS 637: Performed by: INTERNAL MEDICINE

## 2022-07-12 PROCEDURE — 80053 COMPREHEN METABOLIC PANEL: CPT | Performed by: INTERNAL MEDICINE

## 2022-07-12 PROCEDURE — 96372 THER/PROPH/DIAG INJ SC/IM: CPT | Performed by: INTERNAL MEDICINE

## 2022-07-12 PROCEDURE — 36415 COLL VENOUS BLD VENIPUNCTURE: CPT | Performed by: INTERNAL MEDICINE

## 2022-07-12 PROCEDURE — 96376 TX/PRO/DX INJ SAME DRUG ADON: CPT

## 2022-07-12 PROCEDURE — G0378 HOSPITAL OBSERVATION PER HR: HCPCS

## 2022-07-12 PROCEDURE — C9113 INJ PANTOPRAZOLE SODIUM, VIA: HCPCS | Performed by: INTERNAL MEDICINE

## 2022-07-12 PROCEDURE — 85027 COMPLETE CBC AUTOMATED: CPT | Performed by: INTERNAL MEDICINE

## 2022-07-12 PROCEDURE — 120N000001 HC R&B MED SURG/OB

## 2022-07-12 RX ORDER — PANTOPRAZOLE SODIUM 20 MG/1
20 TABLET, DELAYED RELEASE ORAL
Status: DISCONTINUED | OUTPATIENT
Start: 2022-07-13 | End: 2022-07-13 | Stop reason: HOSPADM

## 2022-07-12 RX ADMIN — PIPERACILLIN AND TAZOBACTAM 3.38 G: 3; .375 INJECTION, POWDER, LYOPHILIZED, FOR SOLUTION INTRAVENOUS at 22:20

## 2022-07-12 RX ADMIN — PANTOPRAZOLE SODIUM 40 MG: 40 INJECTION, POWDER, FOR SOLUTION INTRAVENOUS at 08:14

## 2022-07-12 RX ADMIN — PIPERACILLIN AND TAZOBACTAM 3.38 G: 3; .375 INJECTION, POWDER, LYOPHILIZED, FOR SOLUTION INTRAVENOUS at 01:01

## 2022-07-12 RX ADMIN — PIPERACILLIN AND TAZOBACTAM 3.38 G: 3; .375 INJECTION, POWDER, LYOPHILIZED, FOR SOLUTION INTRAVENOUS at 06:29

## 2022-07-12 RX ADMIN — KETOROLAC TROMETHAMINE 15 MG: 15 INJECTION, SOLUTION INTRAMUSCULAR; INTRAVENOUS at 08:44

## 2022-07-12 RX ADMIN — PIPERACILLIN AND TAZOBACTAM 3.38 G: 3; .375 INJECTION, POWDER, LYOPHILIZED, FOR SOLUTION INTRAVENOUS at 14:58

## 2022-07-12 RX ADMIN — MONTELUKAST SODIUM 10 MG: 10 TABLET, FILM COATED ORAL at 21:01

## 2022-07-12 RX ADMIN — FEXOFENADINE HYDROCHLORIDE 180 MG: 60 TABLET ORAL at 08:59

## 2022-07-12 RX ADMIN — ACETAMINOPHEN 650 MG: 325 TABLET, FILM COATED ORAL at 06:53

## 2022-07-12 RX ADMIN — VANCOMYCIN HYDROCHLORIDE 1000 MG: 1 INJECTION, SOLUTION INTRAVENOUS at 10:14

## 2022-07-12 RX ADMIN — ENOXAPARIN SODIUM 40 MG: 40 INJECTION SUBCUTANEOUS at 08:14

## 2022-07-12 RX ADMIN — VANCOMYCIN HYDROCHLORIDE 1000 MG: 1 INJECTION, SOLUTION INTRAVENOUS at 21:01

## 2022-07-12 RX ADMIN — ACETAMINOPHEN 650 MG: 325 TABLET, FILM COATED ORAL at 15:06

## 2022-07-12 ASSESSMENT — ACTIVITIES OF DAILY LIVING (ADL)
DIFFICULTY_COMMUNICATING: NO
ADLS_ACUITY_SCORE: 18
TOILETING_ISSUES: NO
HEARING_DIFFICULTY_OR_DEAF: NO
ADLS_ACUITY_SCORE: 35
WEAR_GLASSES_OR_BLIND: NO
ADLS_ACUITY_SCORE: 35
CHANGE_IN_FUNCTIONAL_STATUS_SINCE_ONSET_OF_CURRENT_ILLNESS/INJURY: NO
DRESSING/BATHING_DIFFICULTY: NO
ADLS_ACUITY_SCORE: 18
WALKING_OR_CLIMBING_STAIRS_DIFFICULTY: NO
FALL_HISTORY_WITHIN_LAST_SIX_MONTHS: NO
DOING_ERRANDS_INDEPENDENTLY_DIFFICULTY: NO
ADLS_ACUITY_SCORE: 35
ADLS_ACUITY_SCORE: 18
CONCENTRATING,_REMEMBERING_OR_MAKING_DECISIONS_DIFFICULTY: NO
DIFFICULTY_EATING/SWALLOWING: NO

## 2022-07-12 NOTE — UTILIZATION REVIEW
Inpatient appropriate  Admission Status; Secondary Review Determination     Under the authority of the Utilization Management Committee, the utilization review process indicated a secondary review on the above patient. The review outcome is based on review of the medical records, discussions with staff, and applying clinical experience noted on the date of the review.     (x) Inpatient Status Appropriate - This patient's medical care is consistent with medical management for inpatient care and reasonable inpatient medical practice.     RATIONALE FOR DETERMINATION   67 years old female evaluated in the ED on 7/10/2022 for 3 days of nausea, vomiting with associatied sweating, musculoskeletal pain and fever.  Past medical history of breast cancer, chemo induced interstitial lung disease and peripheral neuropathy, asthma, seasonal allergies, obesity.  Laboratory work-up remarkable for WBC 15.3, lactic acid 2.5.  CT abdomen and pelvis was unremarkable.  Chest x-ray showed minimal atelectasis or less likely infiltrate at the left lateral lung base.  Patient admitted for IV antibiotics, concerning for possible tickborne illness.  At the time of admission with the information available to the attending physician more than 2 nights Hospital complex care was anticipated, based on patient risk of adverse outcome if treated as outpatient and complex care required. Inpatient admission is appropriate based on the Medicare guidelines.     This document was produced using voice recognition software     The information on this document is developed by the utilization review team in order for the business office to ensure compliance. This only denotes the appropriateness of proper admission status and does not reflect the quality of care rendered.   The definitions of Inpatient Status and Observation Status used in making the determination above are those provided in the CMS Coverage Manual, Chapter 1 and Chapter 6, section 70.4.      Sincerely,     Sourav Gage MD  [facility]  Utilization Review Physician Advisor  Pager: [phone]

## 2022-07-12 NOTE — PROGRESS NOTES
Hospitalist Progress Note    Assessment/Plan  67 year old old female with past medical history of bilateral breast cancer, chemotherapy-induced interstitial lung disease and peripheral neuropathy, asthma, seasonal allergies, obesity who presented for evaluation of fever, myalgias and multiple episodes of emesis.  Labs significant for leukocytosis with left shift, elevated lactic acid but no clear infection source     1.   Fever, myalgias, multiple episodes of emesis.  Concerning for possible tickborne illness.  Labs for Lyme disease, Ehrlichia, Babesia, Anaplasma all are negative.  Will discontinue doxycycline.  Supportive management with as needed antiemetics, Tylenol, IV fluids.  Advance diet as tolerated  2.  Leukocytosis with neutrophil predominance, lactic acidosis, sepsis suspected on admission.  No clear source of infection found, possible viral illness.   WBC is trending down.  Continue empiric antibiotics Zosyn and vancomycin.  Follow-up blood culture results  3.  Suspected GURMEET.  Recommend outpatient sleep study.  Supplemental O2 to keep O2 sats above 94%  4.  Seasonal allergies.  Resume PTA medications  5.  GERD on PPI  6.  History of bilateral breast cancer  7.  Obesity Body mass index is 32.12 kg/m .      Disposition Plan   Expected discharge in 1 days to prior living arrangement once leucocytosis improves/resolves and blood cultures no growth > 24 hours .     Entered: Arabella Jacobs MD 07/12/2022, 11:48 AM         Subjective  Overall feels much better.  No nausea or abdominal pain.  Tolerated toast this morning.  Had normal BM yesterday.  Has had headache this AM - she feels tension headache from retching.  No other new complaints     Objective    Vital signs in last 24 hours  Temp:  [97.6  F (36.4  C)-99.5  F (37.5  C)] 98.3  F (36.8  C)  Pulse:  [] 93  Resp:  [16-22] 16  BP: (119-173)/(56-95) 130/95  SpO2:  [94 %-99 %] 94 % @LASTSAO2(12)@ O2 Device: None (Room air)    Weight:   Wt Readings  from Last 3 Encounters:   07/10/22 77.1 kg (170 lb)   01/26/22 77.3 kg (170 lb 6.4 oz)   07/07/21 76.2 kg (168 lb 1.6 oz)      Weight change:     Intake/Output last 3 shifts  I/O last 3 completed shifts:  In: 1200 [IV Piggyback:1200]  Out: -   Body mass index is 32.12 kg/m .    Physical Exam    General Appearance:    Alert, cooperative, no distress   Lungs:     Clear bilaterally    Cardiovascular:    Regular rate ands rhythm.  Normal S1, S2.  No murmur, rub or gallop.  No edema   Abdomen:     Soft, non-tender, bowel sounds active all four quadrants   Neurologic:   Awake, alert, oriented x 3.  Grossly nonfocal      Pertinent Labs   Lab Results: personally reviewed.   Recent Labs   Lab 07/12/22  0708 07/11/22  0501 07/10/22  2233    138 136   CO2 23 23 27   BUN 10 13 13   ALBUMIN 3.1* 3.6 3.8   ALKPHOS 76 94 104   ALT 11 12 13   AST 33 24 27     Recent Labs   Lab 07/12/22  0708 07/11/22  0501 07/10/22  2233   WBC 12.2* 16.2* 15.3*   HGB 12.5 14.4 15.5   HCT 39.8 46.0 49.0*    333 353     Recent Labs   Lab 07/10/22  2233   TROPONINI <0.01     Invalid input(s): POCGLUFGR    Medications  Current Facility-Administered Medications   Medication     acetaminophen (TYLENOL) tablet 650 mg     albuterol (PROVENTIL HFA/VENTOLIN HFA) inhaler     calcium carbonate (TUMS) chewable tablet 1,000 mg     enoxaparin ANTICOAGULANT (LOVENOX) injection 40 mg     fexofenadine (ALLEGRA) tablet 180 mg     ketorolac (TORADOL) injection 15 mg     lidocaine (LMX4) cream     lidocaine 1 % 0.1-1 mL     melatonin tablet 1 mg     metoclopramide (REGLAN) injection 5 mg     montelukast (SINGULAIR) tablet 10 mg     ondansetron (ZOFRAN ODT) ODT tab 4 mg    Or     ondansetron (ZOFRAN) injection 4 mg     pantoprazole (PROTONIX) IV push injection 40 mg     piperacillin-tazobactam (ZOSYN) 3.375 g vial to attach to  mL bag     prochlorperazine (COMPAZINE) injection 5 mg    Or     prochlorperazine (COMPAZINE) tablet 5 mg    Or      prochlorperazine (COMPAZINE) suppository 12.5 mg     sodium chloride (PF) 0.9% PF flush 3 mL     sodium chloride (PF) 0.9% PF flush 3 mL     sodium chloride 0.9% infusion     vancomycin (VANCOCIN) 1000 mg in dextrose 5% 200 mL PREMIX     Current Outpatient Medications   Medication     acetaminophen (TYLENOL) 325 MG tablet     albuterol (PROAIR HFA;PROVENTIL HFA;VENTOLIN HFA) 90 mcg/actuation inhaler     fexofenadine (ALLEGRA) 180 MG tablet     montelukast (SINGULAIR) 10 mg tablet     multivitamin therapeutic (THERAGRAN) tablet     pantoprazole (PROTONIX) 20 MG tablet     vitamin B-12 (CYANOCOBALAMIN) 1000 MCG tablet       Pertinent Radiology   Radiology Results: Personally reviewed   Results for orders placed or performed during the hospital encounter of 07/11/22   XR Chest Port 1 View    Impression    IMPRESSION: Cardiac silhouette mildly enlarged. Mildly tortuous thoracic aorta. Minimal atelectasis or less likely infiltrate at the left lateral lung base. Right lung is clear. No pneumothorax.   CT Abdomen Pelvis w Contrast    Impression    IMPRESSION:   1.  Scattered colonic diverticulosis without evidence of diverticulitis.   2.  Normal appendix         Advanced Care Planning:  Discharge Planning discussed with patient and RN       Arabella Jacobs MD  Internal Medicine Hospitalist  7/12/2022

## 2022-07-12 NOTE — PLAN OF CARE
Goal Outcome Evaluation:    Plan of Care Reviewed With: patient, spouse     Overall Patient Progress: improving    Outcome Evaluation: nausea resolved, tolerating general diet, afebrile

## 2022-07-12 NOTE — PLAN OF CARE
Problem: Plan of Care - These are the overarching goals to be used throughout the patient stay.    Goal: Plan of Care Review/Shift Note  Outcome: Ongoing, Progressing   Goal Outcome Evaluation:      Pt weakness has improved through the noc and pt is more steady on her feet. No further emesis through this shift. VSS. Tolerating IV abx and fluids without adverse effect.

## 2022-07-13 ENCOUNTER — APPOINTMENT (OUTPATIENT)
Dept: CT IMAGING | Facility: HOSPITAL | Age: 67
DRG: 864 | End: 2022-07-13
Attending: INTERNAL MEDICINE
Payer: COMMERCIAL

## 2022-07-13 VITALS
SYSTOLIC BLOOD PRESSURE: 144 MMHG | BODY MASS INDEX: 32.19 KG/M2 | RESPIRATION RATE: 20 BRPM | HEIGHT: 61 IN | WEIGHT: 170.5 LBS | HEART RATE: 80 BPM | OXYGEN SATURATION: 97 % | TEMPERATURE: 97.9 F | DIASTOLIC BLOOD PRESSURE: 66 MMHG

## 2022-07-13 LAB
A PHAGOCYTOPH DNA BLD QL NAA+PROBE: NOT DETECTED
ALBUMIN SERPL-MCNC: 2.9 G/DL (ref 3.5–5)
ALP SERPL-CCNC: 77 U/L (ref 45–120)
ALT SERPL W P-5'-P-CCNC: <9 U/L (ref 0–45)
ANION GAP SERPL CALCULATED.3IONS-SCNC: 5 MMOL/L (ref 5–18)
AST SERPL W P-5'-P-CCNC: 23 U/L (ref 0–40)
B MICROTI DNA BLD QL NAA+PROBE: NOT DETECTED
BABESIA DNA BLD QL NAA+PROBE: NOT DETECTED
BILIRUB SERPL-MCNC: 0.3 MG/DL (ref 0–1)
BUN SERPL-MCNC: 10 MG/DL (ref 8–22)
CALCIUM SERPL-MCNC: 8.4 MG/DL (ref 8.5–10.5)
CHLORIDE BLD-SCNC: 110 MMOL/L (ref 98–107)
CO2 SERPL-SCNC: 26 MMOL/L (ref 22–31)
CREAT SERPL-MCNC: 0.76 MG/DL (ref 0.6–1.1)
E CHAFFEENSIS DNA BLD QL NAA+PROBE: NOT DETECTED
E EWINGII DNA SPEC QL NAA+PROBE: NOT DETECTED
EHRLICHIA DNA SPEC QL NAA+PROBE: NOT DETECTED
ERYTHROCYTE [DISTWIDTH] IN BLOOD BY AUTOMATED COUNT: 15.6 % (ref 10–15)
GFR SERPL CREATININE-BSD FRML MDRD: 85 ML/MIN/1.73M2
GLUCOSE BLD-MCNC: 80 MG/DL (ref 70–125)
HCT VFR BLD AUTO: 42.6 % (ref 35–47)
HGB BLD-MCNC: 13.3 G/DL (ref 11.7–15.7)
MCH RBC QN AUTO: 27.7 PG (ref 26.5–33)
MCHC RBC AUTO-ENTMCNC: 31.2 G/DL (ref 31.5–36.5)
MCV RBC AUTO: 89 FL (ref 78–100)
PLATELET # BLD AUTO: 242 10E3/UL (ref 150–450)
POTASSIUM BLD-SCNC: 3.8 MMOL/L (ref 3.5–5)
PROT SERPL-MCNC: 6.3 G/DL (ref 6–8)
RBC # BLD AUTO: 4.81 10E6/UL (ref 3.8–5.2)
SODIUM SERPL-SCNC: 141 MMOL/L (ref 136–145)
VANCOMYCIN SERPL-MCNC: 19.6 MG/L
WBC # BLD AUTO: 10.2 10E3/UL (ref 4–11)

## 2022-07-13 PROCEDURE — 258N000003 HC RX IP 258 OP 636: Performed by: INTERNAL MEDICINE

## 2022-07-13 PROCEDURE — 99239 HOSP IP/OBS DSCHRG MGMT >30: CPT | Performed by: INTERNAL MEDICINE

## 2022-07-13 PROCEDURE — 250N000013 HC RX MED GY IP 250 OP 250 PS 637: Performed by: INTERNAL MEDICINE

## 2022-07-13 PROCEDURE — 36415 COLL VENOUS BLD VENIPUNCTURE: CPT | Performed by: INTERNAL MEDICINE

## 2022-07-13 PROCEDURE — 250N000011 HC RX IP 250 OP 636: Performed by: INTERNAL MEDICINE

## 2022-07-13 PROCEDURE — 85027 COMPLETE CBC AUTOMATED: CPT | Performed by: INTERNAL MEDICINE

## 2022-07-13 PROCEDURE — 80202 ASSAY OF VANCOMYCIN: CPT | Performed by: INTERNAL MEDICINE

## 2022-07-13 PROCEDURE — 70450 CT HEAD/BRAIN W/O DYE: CPT

## 2022-07-13 PROCEDURE — 80053 COMPREHEN METABOLIC PANEL: CPT | Performed by: INTERNAL MEDICINE

## 2022-07-13 RX ORDER — OMEGA-3 FATTY ACIDS/FISH OIL 300-1000MG
600 CAPSULE ORAL EVERY 8 HOURS PRN
COMMUNITY
Start: 2022-07-13 | End: 2023-03-24

## 2022-07-13 RX ORDER — DOXYCYCLINE 100 MG/1
100 CAPSULE ORAL EVERY 12 HOURS SCHEDULED
Status: DISCONTINUED | OUTPATIENT
Start: 2022-07-13 | End: 2022-07-13 | Stop reason: HOSPADM

## 2022-07-13 RX ADMIN — PIPERACILLIN AND TAZOBACTAM 3.38 G: 3; .375 INJECTION, POWDER, LYOPHILIZED, FOR SOLUTION INTRAVENOUS at 06:19

## 2022-07-13 RX ADMIN — FEXOFENADINE HYDROCHLORIDE 180 MG: 60 TABLET ORAL at 08:04

## 2022-07-13 RX ADMIN — PANTOPRAZOLE SODIUM 20 MG: 20 TABLET, DELAYED RELEASE ORAL at 06:19

## 2022-07-13 RX ADMIN — VANCOMYCIN HYDROCHLORIDE 750 MG: 1 INJECTION, POWDER, LYOPHILIZED, FOR SOLUTION INTRAVENOUS at 08:04

## 2022-07-13 RX ADMIN — ENOXAPARIN SODIUM 40 MG: 40 INJECTION SUBCUTANEOUS at 08:05

## 2022-07-13 RX ADMIN — ACETAMINOPHEN 650 MG: 325 TABLET, FILM COATED ORAL at 00:13

## 2022-07-13 RX ADMIN — ACETAMINOPHEN 650 MG: 325 TABLET, FILM COATED ORAL at 06:21

## 2022-07-13 RX ADMIN — DOXYCYCLINE 100 MG: 100 CAPSULE ORAL at 13:20

## 2022-07-13 ASSESSMENT — ACTIVITIES OF DAILY LIVING (ADL)
ADLS_ACUITY_SCORE: 18

## 2022-07-13 ASSESSMENT — MIFFLIN-ST. JEOR: SCORE: 1052.12

## 2022-07-13 NOTE — PROGRESS NOTES
Care Management Follow Up    Length of Stay (days): 1    Expected Discharge Date: 07/13/2022       Concerns to be Addressed:   Workup for possible tick-borne illness in progress; currently receiving IV Vancomycin and IV Zosyn. Labs pending.   Patient plan of care discussed at interdisciplinary rounds: Yes  Follow up from rounds/notes:   Per ID sticky note, consider discontinuation of Vancomcyin.     Anticipated Discharge Disposition:  Home     Anticipated Discharge Services:  To be determined.   Anticipated Discharge DME:  None anticipated.    Patient/family educated on Medicare website which has current facility and service quality ratings:  NA  Education Provided on the Discharge Plan:  Per team  Patient/Family in Agreement with the Plan:  yes    Referrals Placed by CM/SW:  None  Private pay costs discussed: Not applicable     Additional Information:  Patient is  and independent with activities of daily living at baseline.  No care management needs identified at this time but CM will continue to monitor progression of care, review team recommendations and provide discharge planning assist as needed.        Pinky Sheldon RN

## 2022-07-13 NOTE — PHARMACY-VANCOMYCIN DOSING SERVICE
"Pharmacy Vancomycin Note  Date of Service 2022  Patient's  1955   67 year old, female    Indication: Sepsis  Day of Therapy: 3  Current vancomycin regimen:  1000 mg IV q12h  Current vancomycin monitoring method: AUC  Current vancomycin therapeutic monitoring goal: 400-600 mg*h/L    InsightRX Prediction of Current Vancomycin Regimen    Regimen: 1000 mg IV every 12 hours.  Start time: 11:30 on 2022  Exposure target: AUC24 (range)400-600 mg/L.hr   AUC24,ss: 551 mg/L.hr  Probability of AUC24 > 400: 81 %  Ctrough,ss: 17.7 mg/L  Probability of Ctrough,ss > 20: 39 %  Probability of nephrotoxicity (Lodise NANNETTE ): 14 %    Current estimated CrCl = Estimated Creatinine Clearance: 67.6 mL/min (based on SCr of 0.76 mg/dL).    Creatinine for last 3 days  7/10/2022: 10:33 PM Creatinine 0.83 mg/dL  2022:  5:01 AM Creatinine 0.74 mg/dL  2022:  7:08 AM Creatinine 0.81 mg/dL  2022:  6:33 AM Creatinine 0.76 mg/dL    Recent Vancomycin Levels (past 3 days)  2022:  6:33 AM Vancomycin 19.6 mg/L    Vancomycin IV Administrations (past 72 hours)                   vancomycin (VANCOCIN) 1000 mg in dextrose 5% 200 mL PREMIX (mg) 1,000 mg New Bag 22 2101     1,000 mg New Bag  1014     1,000 mg New Bag 22 2251    vancomycin (VANCOCIN) 1,750 mg in sodium chloride 0.9 % 500 mL intermittent infusion (mg) 1,750 mg Given 22 1007                Nephrotoxins and other renal medications (From now, onward)    Start     Dose/Rate Route Frequency Ordered Stop    22 2200  vancomycin (VANCOCIN) 1000 mg in dextrose 5% 200 mL PREMIX         1,000 mg  200 mL/hr over 1 Hours Intravenous EVERY 12 HOURS 22 1032      22 1500  piperacillin-tazobactam (ZOSYN) 3.375 g vial to attach to  mL bag        Note to Pharmacy: For [facility], [facility] and [facility]: For Zosyn-naive patients, use the \"Zosyn initial dose + extended infusion\" order panel.    3.375 g  over 240 Minutes Intravenous EVERY 8 HOURS " 07/11/22 0839      07/11/22 0838  ketorolac (TORADOL) injection 15 mg         15 mg Intravenous EVERY 6 HOURS PRN 07/11/22 0838 07/16/22 0837             Contrast Orders - past 72 hours (72h ago, onward)    Start     Dose/Rate Route Frequency Stop    07/11/22 0400  iopamidol (ISOVUE-370) solution 75 mL         75 mL Intravenous ONCE 07/11/22 0412          Interpretation of levels and current regimen:  Vancomycin level is reflective of -600    Has serum creatinine changed greater than 50% in last 72 hours: No    Urine output:  unable to determine    Renal Function: Stable    InsightRX Prediction of Planned New Vancomycin Regimen    Loading dose: N/A  Regimen: 750 mg IV every 12 hours.  Start time: 08:24 on 07/13/2022  Exposure target: AUC24 (range)400-600 mg/L.hr   AUC24,ss: 437 mg/L.hr  Probability of AUC24 > 400: 68 %  Ctrough,ss: 14.2 mg/L  Probability of Ctrough,ss > 20: 9 %  Probability of nephrotoxicity (Lodise NANNETTE 2009): 9 %    Plan:  1. Decrease Dose to 750mg IV Q12H since current regimen is predicted to have AUC at steady state of 575 mg/L.hr and may accumulate vancomycin beyond goal of 600  2. Vancomycin monitoring method: AUC  3. Vancomycin therapeutic monitoring goal: 400-600 mg*h/L  4. Pharmacy will check vancomycin levels as appropriate in 3-5 Days.  5. Serum creatinine levels will be ordered daily for the first week of therapy and at least twice weekly for subsequent weeks.    April Lira, [facility]

## 2022-07-13 NOTE — PLAN OF CARE
Goal Outcome Evaluation:      Pt discharged at this time with all belongings. Education sheet provided on Augmentin. No questions on discharge instructions. W/C escort to main extit per NA/R.

## 2022-07-13 NOTE — PLAN OF CARE
Problem: Infection  Goal: Absence of Infection Signs and Symptoms  Outcome: Ongoing, Progressing   Goal Outcome Evaluation:      Afebrile, VSS. Pt c/o headache, received prn tylenol which was effective. C/o feeling chilled and rigors. Receiving iv abx. Voiding/drinking well  Problem: Infection  Goal: Absence of Infection Signs and Symptoms  Outcome: Ongoing, Progressing     Problem: Nausea and Vomiting  Goal: Fluid and Electrolyte Balance  Outcome: Ongoing, Progressing     Problem: Plan of Care - These are the overarching goals to be used throughout the patient stay.    Goal: Absence of Hospital-Acquired Illness or Injury  Intervention: Identify and Manage Fall Risk  Recent Flowsheet Documentation  Taken 7/13/2022 0020 by Katrin Schmitt RN  Safety Promotion/Fall Prevention:   patient and family education   nonskid shoes/slippers when out of bed  Intervention: Prevent Skin Injury  Recent Flowsheet Documentation  Taken 7/13/2022 0020 by Katrin Schmitt RN  Body Position: position changed independently  Intervention: Prevent and Manage VTE (Venous Thromboembolism) Risk  Recent Flowsheet Documentation  Taken 7/13/2022 0246 by Katrin Schmitt RN  Activity Management: ambulated to bathroom  Taken 7/13/2022 0020 by Katrin Schmitt RN  Activity Management: ambulated to bathroom  Goal: Optimal Comfort and Wellbeing  Intervention: Monitor Pain and Promote Comfort  Recent Flowsheet Documentation  Taken 7/13/2022 0013 by Katrin Schmitt RN  Pain Management Interventions:   medication (see MAR)   relaxation techniques promoted   rest

## 2022-07-13 NOTE — PROGRESS NOTES
"CLINICAL NUTRITION SERVICES - ASSESSMENT NOTE     Nutrition Prescription    RECOMMENDATIONS FOR MDs/PROVIDERS TO ORDER:  None at this time    Malnutrition Status:    Does not meet criteria    Recommendations already ordered by Registered Dietitian (RD):  Trial Ensure Enlive supplement at 2pm snack  Encourage po intake    Future/Additional Recommendations:  Adjust supplement pending intake/pt preference  Monitor po intake, weight trends, labs and poc       REASON FOR ASSESSMENT  Willa Hernandez is a/an 67 year old female assessed by the dietitian for Reason For Assessment: identified at risk by screening criteria, Nutrition risk monitoring    Pt with PMH of bilateral breast cancer, chemotherapy induced interstitial lung disease and peripheral neuropathy, asthma, who presents with fever, myalgia, and multiple episodes of emesis.      NUTRITION HISTORY  Nutrition/Diet History  Food Preferences: Fruits, vegetables, cereal, hotdishes, pizza, hot cocoa, juice; pt reports to not like/eat much meat  Meal/Snack Patterns: 3 meals/day with snacks    CURRENT NUTRITION ORDERS  Diet/Feeding Tolerance: Pt had been having poor intake/appetite for 2-3 days; pt reports appetite came back yesterday and has now been okay/good.    Intake (%): Pt with varied intake - 7/11 ordered 2 meals at 277 calories and 1 gm protein (was on clears). 7/12 pt consumed % of 3 meals providing 1703 calories and 51 gm protein. Today pt ate 75% of 2 meals providing 833 calories and 33 gm protein.     LABS  Lab Results Reviewed: reviewed  Lab Results Comments: Na 141, K 3.8, BUN 10, Cr 0.76, GFR 85, LFT's WDL, Glu 80, WBC 10.2, Hgb 13.1    MEDICATIONS  Pertinent Medications Reviewed: reviewed  Pertinent Medications Comments: monodox, protonix, zosyn    ANTHROPOMETRICS  Height: 154.9 cm (5' 1\")  Most Recent Weight: 77.3 kg (170 lb 8 oz)    Weight History:  Wt Readings from Last 5 Encounters:   07/12/22 77.3 kg (170 lb 8 oz)   01/26/22 77.3 kg " (170 lb 6.4 oz)   07/07/21 76.2 kg (168 lb 1.6 oz)   10/02/19 76.5 kg (168 lb 11.2 oz)   04/05/19 73.7 kg (162 lb 8 oz)     BMI (kg/m2): 32.23  BMI Assessment: BMI 30-34.9: obesity grade I      Weight for Calculation (kg): 58 kg (127 lb 13.9 oz) (adjusted bw)     ASSESSED NUTRITION NEEDS  Estimated Calorie Requirement (kcal/day): 6598-3619 calories/day (25-20 kcal/kg using adjusted BW)  Estimated/Assessed Protein Need (g/day): 58-69 gm protein/day (1-1.2 g/kg using adjusted BW  Estimated/Assessed Fluid Req (mL/day): 58-69 ml fluid/day (25-30 ml/kg using adjusted BW, or per provider)    PHYSICAL FINDINGS  Castro Score 20; Skin WDL   Audible bowel sounds; LBM 7/11    MALNUTRITION  Final Malnutrition Summary  Malnutrition Criteria Met?: no    NUTRITION DIAGNOSIS  Nutrition Diagnosis: Inadequate protein intake related to food preferences as evidenced by protein intake not meeting estimated nutrition needs     INTERVENTIONS  Implementation  Trial Ensure Enlive supplement at 2pm snack  Encourage po intake    Goals  Patient Goals:Nutrition Focus  Nutrition Goals:: Weight, PO  Weight Goal:: Maintain  PO Goal:: PO >75%, Meet estimated needs     Monitoring/Evaluation  Monitoring: Monitor patient per protocol, Nutrition-focused physical findings, Anthropometric measurements, Biochemical Data/labs    Signed: Julisa Daly, Dietetic Intern

## 2022-07-13 NOTE — CONSULTS
Integrative Therapy Consult    Healing PresenceYes  Essential Oils: Topical (EO/Topical Oil), Inhalation (EO/Topical oil)     Support Healing process blend - HC, Lavender Massage Oil - HC       Healing Music: TV/Care channel     Breathwork:       Guided Imagery:       Acupressure:       Oshibori:       Energy Therapy: Healing touch     Healing Touch: Done     Reiki:       Qi Gong:     Massage: Targeted massage      Targeted Massage: Neck and shoulders  Sleep Promotion:       Other Therapy:       Intervention Reason: Headache, Pain     Pre and Post Session Scores: Patient Desires Treatment: yes           Pre-session Pain: 5 Post-session Pain: 4               Delivery:         Referrals:      Donita Diaz

## 2022-07-13 NOTE — PLAN OF CARE
Pt denies nausea.  C/o mild tension headache.  Afebrile.  Tolerating PO intake very well.  Urinating regularly.  Continues on IV ABX.  Gi Davis RN     Problem: Infection  Goal: Absence of Infection Signs and Symptoms  Outcome: Ongoing, Not Progressing     Problem: Nausea and Vomiting  Goal: Fluid and Electrolyte Balance  Outcome: Ongoing, Not Progressing     Problem: Plan of Care - These are the overarching goals to be used throughout the patient stay.    Goal: Optimal Comfort and Wellbeing  Outcome: Ongoing, Not Progressing   Goal Outcome Evaluation:

## 2022-07-13 NOTE — PROGRESS NOTES
"Pt called for assist to use restroom. Pt denied SOB. And nausea. Pt reports feellng \"chilled\" and and pt was \"shivering\", also reports headache. Pt is afebrile, received an extra blanket and tylenol per request. Continue to monitor  "

## 2022-07-13 NOTE — DISCHARGE SUMMARY
[facility] MEDICINE  DISCHARGE SUMMARY     Primary Care Physician: [place]  Admission Date: 7/11/2022   Discharge Provider: Arabella Jacobs MD Discharge Date: 7/13/2022   Diet:   Active Diet and Nourishment Order   Procedures     Diet    Regular    Code Status: Full code    Activity: DCACTIVITY: Activity as tolerated        Condition at Discharge: Stable     REASON FOR PRESENTATION(See Admission Note for Details)     Fever, myalgias, nausea, emesis     PRINCIPAL & ACTIVE DISCHARGE DIAGNOSES     Principal Problem:    Acute febrile illness  Active Problems:    GERD without esophagitis    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)    Vomiting    Lactic acidosis    Class 1 obesity in adult    Vomiting without nausea, intractability of vomiting not specified, unspecified vomiting type    Sepsis - suspected on admission, ruled out      PENDING LABS     Unresulted Labs Ordered in the Past 30 Days of this Admission     Date and Time Order Name Status Description    7/11/2022  5:24 AM Blood Culture Peripheral Blood Preliminary             PROCEDURES ( this hospitalization only)          RECOMMENDATIONS TO OUTPATIENT PROVIDER FOR F/U VISIT     Follow-up Appointments     Follow-up and recommended labs and tests       Follow up with primary care provider, [place] [facility], within 7   days for hospital follow- up.  The following labs/tests are recommended:   CBC, CMP, CRP.  Consider referral for sleep study as patient was noted to have hypoxia while sleeping   Please make an appointment to see ENT specialist for your ear problems.               DISPOSITION     Home    SUMMARY OF HOSPITAL COURSE:      Willa Munoz is a 67 year old old female with past medical history of bilateral breast cancer, chemotherapy-induced interstitial lung disease and peripheral neuropathy, asthma, seasonal allergies, obesity who presented to [facility] for evaluation  of fever, myalgias and multiple episodes of emesis.  Labs were significant for leukocytosis with neutrophil predominance and elevated lactic acid.  No clear source of infection was found.  Patient has been on empiric IV Zosyn/vancomycin.  She was also on doxycycline as tickborne illness was suspected ([place] cabin[place] however peripheral blood smear and PCR was negative for Babesia, Anaplasma, Ehrlichia and Lyme disease antibodies were negative.   Overall patient is significantly improved, did not have any recurrent fevers or nausea and leukocytosis/lactic acidosis resolved.  She will be discharged home on empiric Augmentin.  Patient reports snoring at night and she was noted to have hypoxia while sleeping therefore strongly recommend outpatient sleep study to evaluate for sleep apnea.      Discharge Medications with Med changes:     Discharge Medication List as of 7/13/2022  2:55 PM      START taking these medications    Details   amoxicillin-clavulanate (AUGMENTIN) 875-125 MG tablet Take 1 tablet by mouth 2 times daily for 7 days, Disp-14 tablet, R-0, E-Prescribe      ibuprofen (ADVIL/MOTRIN) 200 MG capsule Take 3 capsules (600 mg) by mouth every 8 hours as needed (headache), OTC         CONTINUE these medications which have NOT CHANGED    Details   acetaminophen (TYLENOL) 325 MG tablet Take 650 mg by mouth every 6 hours as needed for mild pain, Historical      albuterol (PROAIR HFA;PROVENTIL HFA;VENTOLIN HFA) 90 mcg/actuation inhaler [ALBUTEROL (PROAIR HFA;PROVENTIL HFA;VENTOLIN HFA) 90 MCG/ACTUATION INHALER] Inhale 2 puffs every 4 (four) hours as needed for wheezing., Historical      fexofenadine (ALLEGRA) 180 MG tablet [FEXOFENADINE (ALLEGRA) 180 MG TABLET] Take 180 mg by mouth daily., Historical      montelukast (SINGULAIR) 10 mg tablet [MONTELUKAST (SINGULAIR) 10 MG TABLET] Take 10 mg by mouth bedtime., Historical      multivitamin therapeutic (THERAGRAN) tablet [MULTIVITAMIN THERAPEUTIC  (THERAGRAN) TABLET] Take 1 tablet by mouth every evening. , Historical      pantoprazole (PROTONIX) 20 MG tablet [PANTOPRAZOLE (PROTONIX) 20 MG TABLET] take 1 tablet by mouth once daily to decrease stomach acid, Historical      vitamin B-12 (CYANOCOBALAMIN) 1000 MCG tablet Take 1,000 mcg by mouth daily, Historical                   Rationale for medication changes:      Augmentin x7 days empirically for acute febrile illness        Consults       PHARMACY TO DOSE [facility]  PHARMACY TO DOSE [facility]  CARE MANAGEMENT / SOCIAL WORK IP CONSULT    Immunizations given this encounter     Most Recent Immunizations   Administered Date(s) Administered     COVID-19,PF,Pfizer (12+ Yrs) 09/28/2021     COVID-19,PF,Pfizer 12+ Yrs (2022 and After) 04/27/2022     Influenza Vaccine IM > 6 months Valent IIV4 (Alfuria,Fluzone) 09/27/2017     Pneumococcal 23 valent 07/01/2013           Anticoagulation Information      Recent INR results: No results for input(s): INR in the last 168 hours.  Warfarin doses (if applicable) or name of other anticoagulant: None      SIGNIFICANT IMAGING FINDINGS     Results for orders placed or performed during the hospital encounter of 07/11/22   XR Chest Port 1 View    Impression    IMPRESSION: Cardiac silhouette mildly enlarged. Mildly tortuous thoracic aorta. Minimal atelectasis or less likely infiltrate at the left lateral lung base. Right lung is clear. No pneumothorax.   CT Abdomen Pelvis w Contrast    Impression    IMPRESSION:   1.  Scattered colonic diverticulosis without evidence of diverticulitis.   2.  Normal appendix   CT Head w/o Contrast    Impression    IMPRESSION:  1.  No CT evidence for acute intracranial process.  2.  Brain atrophy and presumed chronic microvascular ischemic changes as above.       SIGNIFICANT LABORATORY FINDINGS     Most Recent 3 CBC's:Recent Labs   Lab Test 07/13/22  0633 07/12/22  0708 07/11/22  0501   WBC 10.2 12.2* 16.2*   HGB 13.3 12.5 14.4   MCV 89 87 88    283 333      Most Recent 3 BMP's:Recent Labs   Lab Test 07/13/22  0633 07/12/22  0708 07/11/22  0501    138 138   POTASSIUM 3.8 3.5 4.6   CHLORIDE 110* 106 104   CO2 26 23 23   BUN 10 10 13   CR 0.76 0.81 0.74   ANIONGAP 5 9 11   BERNY 8.4* 8.3* 8.6   GLC 80 88 120     Most Recent 2 LFT's:Recent Labs   Lab Test 07/13/22  0633 07/12/22  0708   AST 23 33   ALT <9 11   ALKPHOS 77 76   BILITOTAL 0.3 0.4         Discharge Orders        Reason for your hospital stay    Acute febrile illness     Follow-up and recommended labs and tests     Follow up with primary care provider, [place] [facility], within 7 days for hospital follow- up.  The following labs/tests are recommended: CBC, CRP.    Please make an appointment to see ENT specialist for your ear problems.     Activity    Your activity upon discharge: activity as tolerated     Diet    Follow this diet upon discharge: Orders Placed This Encounter        Advance Diet as Tolerated: Regular Diet Adult       Examination   Physical Exam   Temp:  [97.9  F (36.6  C)-98.3  F (36.8  C)] 97.9  F (36.6  C)  Pulse:  [80-82] 80  Resp:  [20] 20  BP: (139-145)/(64-74) 144/66  SpO2:  [97 %] 97 %  Wt Readings from Last 1 Encounters:   07/12/22 77.3 kg (170 lb 8 oz)       Physical Exam     General Appearance:    Alert, cooperative, no distress   Lungs:     Clear bilaterally    Cardiovascular:    Regular rate ands rhythm.  Normal S1, S2.  No murmur, rub or gallop.  No edema   Abdomen:     Soft, non-tender, bowel sounds active all four quadrants   Neurologic:   Awake, alert, oriented x 3.  Grossly nonfocal          Please see EMR for more detailed significant labs, imaging, consultant notes etc.    I, Arabella Jacobs MD, personally saw the patient today and spent greater than 30 minutes discharging this patient.    Arabella Jacobs MD  [facility]    CC:[place] [facility]

## 2022-07-13 NOTE — PROGRESS NOTES
Care Management Discharge Note    Discharge Date: 07/13/2022       Discharge Disposition:  Home    Discharge Services:  None    Discharge DME:  NA    Discharge Transportation:  Family    Private pay costs discussed: Not applicable    PAS Confirmation Code:  NA  Patient/family educated on Medicare website which has current facility and service quality ratings:  NA    Education Provided on the Discharge Plan:  Per team  Persons Notified of Discharge Plans: nursing  Patient/Family in Agreement with the Plan:  yes    Handoff Referral Completed: No    Additional Information:  Discharged to home. No CM needs identified.     Pinky Sheldon RN

## 2022-07-14 DIAGNOSIS — Z71.89 OTHER SPECIFIED COUNSELING: ICD-10-CM

## 2022-07-15 ENCOUNTER — PATIENT OUTREACH (OUTPATIENT)
Dept: CARE COORDINATION | Facility: CLINIC | Age: 67
End: 2022-07-15

## 2022-07-15 NOTE — PROGRESS NOTES
Clinic Care Coordination Contact  Care Team Conversations    Patient identified for care management outreach, however [facility] RN staff has already followed up with patient to ensure they are following up with PCP and have needs and resources met. Clinic RN will refer back to MARIBETH BEAR if needed/appropriate.    MARLON Levy  Social Work Care Coordinator - [facility]  Care Coordination  [email or web address]  Cell Phone: [phone]  Gender pronouns: she/her  Employed by [facility] [employer]

## 2022-07-16 LAB — BACTERIA BLD CULT: NO GROWTH

## 2022-07-20 ENCOUNTER — LAB REQUISITION (OUTPATIENT)
Dept: LAB | Facility: CLINIC | Age: 67
End: 2022-07-20

## 2022-07-20 DIAGNOSIS — R50.9 FEVER, UNSPECIFIED: ICD-10-CM

## 2022-07-20 LAB
ALBUMIN SERPL BCG-MCNC: 4.4 G/DL (ref 3.5–5.2)
ALP SERPL-CCNC: 117 U/L (ref 35–104)
ALT SERPL W P-5'-P-CCNC: 18 U/L (ref 10–35)
ANION GAP SERPL CALCULATED.3IONS-SCNC: 10 MMOL/L (ref 7–15)
AST SERPL W P-5'-P-CCNC: 29 U/L (ref 10–35)
BILIRUB SERPL-MCNC: 0.2 MG/DL
BUN SERPL-MCNC: 16.4 MG/DL (ref 8–23)
CALCIUM SERPL-MCNC: 9.7 MG/DL (ref 8.8–10.2)
CHLORIDE SERPL-SCNC: 99 MMOL/L (ref 98–107)
CREAT SERPL-MCNC: 0.7 MG/DL (ref 0.51–0.95)
CRP SERPL-MCNC: 7.81 MG/L
DEPRECATED HCO3 PLAS-SCNC: 27 MMOL/L (ref 22–29)
GFR SERPL CREATININE-BSD FRML MDRD: >90 ML/MIN/1.73M2
GLUCOSE SERPL-MCNC: 122 MG/DL (ref 70–99)
POTASSIUM SERPL-SCNC: 4.8 MMOL/L (ref 3.4–5.3)
PROT SERPL-MCNC: 7.5 G/DL (ref 6.4–8.3)
SODIUM SERPL-SCNC: 136 MMOL/L (ref 136–145)

## 2022-07-20 PROCEDURE — 80053 COMPREHEN METABOLIC PANEL: CPT | Performed by: FAMILY MEDICINE

## 2022-07-20 PROCEDURE — 86140 C-REACTIVE PROTEIN: CPT | Performed by: FAMILY MEDICINE

## 2022-08-01 ENCOUNTER — LAB REQUISITION (OUTPATIENT)
Dept: LAB | Facility: CLINIC | Age: 67
End: 2022-08-01

## 2022-08-01 ENCOUNTER — TRANSFERRED RECORDS (OUTPATIENT)
Dept: HEALTH INFORMATION MANAGEMENT | Facility: CLINIC | Age: 67
End: 2022-08-01

## 2022-08-01 DIAGNOSIS — R79.82 ELEVATED C-REACTIVE PROTEIN (CRP): ICD-10-CM

## 2022-08-01 LAB — CRP SERPL-MCNC: 8.67 MG/L

## 2022-08-01 PROCEDURE — 86140 C-REACTIVE PROTEIN: CPT | Performed by: FAMILY MEDICINE

## 2022-08-08 ENCOUNTER — HOSPITAL ENCOUNTER (OUTPATIENT)
Dept: CT IMAGING | Facility: HOSPITAL | Age: 67
Discharge: HOME | End: 2022-08-08
Attending: FAMILY MEDICINE | Admitting: FAMILY MEDICINE
Payer: COMMERCIAL

## 2022-08-08 DIAGNOSIS — R10.9 ABDOMINAL PAIN: ICD-10-CM

## 2022-08-08 PROCEDURE — 74177 CT ABD & PELVIS W/CONTRAST: CPT

## 2022-08-08 PROCEDURE — 255N000002 HC RX 255 OP 636

## 2022-08-08 RX ADMIN — IOHEXOL 100 ML: 350 INJECTION, SOLUTION INTRAVENOUS at 10:10

## 2022-08-15 ENCOUNTER — ANCILLARY PROCEDURE (OUTPATIENT)
Dept: MAMMOGRAPHY | Facility: CLINIC | Age: 67
End: 2022-08-15
Attending: INTERNAL MEDICINE
Payer: COMMERCIAL

## 2022-08-15 DIAGNOSIS — Z12.31 VISIT FOR SCREENING MAMMOGRAM: ICD-10-CM

## 2022-08-15 PROCEDURE — 77067 SCR MAMMO BI INCL CAD: CPT

## 2022-08-23 ENCOUNTER — ONCOLOGY VISIT (OUTPATIENT)
Dept: ONCOLOGY | Facility: HOSPITAL | Age: 67
End: 2022-08-23
Attending: INTERNAL MEDICINE
Payer: COMMERCIAL

## 2022-08-23 VITALS
DIASTOLIC BLOOD PRESSURE: 82 MMHG | TEMPERATURE: 99.5 F | SYSTOLIC BLOOD PRESSURE: 140 MMHG | BODY MASS INDEX: 30.89 KG/M2 | HEIGHT: 61 IN | RESPIRATION RATE: 28 BRPM | HEART RATE: 91 BPM | WEIGHT: 163.6 LBS | OXYGEN SATURATION: 97 %

## 2022-08-23 DIAGNOSIS — Z17.1 MALIGNANT NEOPLASM OF UPPER-OUTER QUADRANT OF RIGHT BREAST IN FEMALE, ESTROGEN RECEPTOR NEGATIVE (H): Primary | ICD-10-CM

## 2022-08-23 DIAGNOSIS — C50.411 MALIGNANT NEOPLASM OF UPPER-OUTER QUADRANT OF RIGHT BREAST IN FEMALE, ESTROGEN RECEPTOR NEGATIVE (H): Primary | ICD-10-CM

## 2022-08-23 PROCEDURE — G0463 HOSPITAL OUTPT CLINIC VISIT: HCPCS

## 2022-08-23 PROCEDURE — 99213 OFFICE O/P EST LOW 20 MIN: CPT | Performed by: INTERNAL MEDICINE

## 2022-08-23 RX ORDER — PROCHLORPERAZINE MALEATE 5 MG
TABLET ORAL
COMMUNITY
Start: 2022-07-22 | End: 2022-09-30

## 2022-08-23 ASSESSMENT — PAIN SCALES - GENERAL: PAINLEVEL: NO PAIN (0)

## 2022-08-23 NOTE — PROGRESS NOTES
"Oncology Rooming Note    August 23, 2022 2:57 PM   Willa Hernandez is a 67 year old female who presents for:    Chief Complaint   Patient presents with     Oncology Clinic Visit     6 month return Malignant neoplasm of upper-outer quadrant of right breast in female, estrogen receptor negative, review Mammogram     Initial Vitals: BP (!) 140/82 (BP Location: Right arm, Patient Position: Sitting, Cuff Size: Adult Regular)   Pulse 91   Temp 99.5  F (37.5  C) (Oral)   Resp 28   Ht 1.549 m (5' 1\")   Wt 74.2 kg (163 lb 9.6 oz)   SpO2 97%   BMI 30.91 kg/m   Estimated body mass index is 30.91 kg/m  as calculated from the following:    Height as of this encounter: 1.549 m (5' 1\").    Weight as of this encounter: 74.2 kg (163 lb 9.6 oz). Body surface area is 1.79 meters squared.  No Pain (0) Comment: Data Unavailable   No LMP recorded.  Allergies reviewed: Yes  Medications reviewed: Yes    Medications: Medication refills not needed today.  Pharmacy name entered into [facility]: [id number] - [place] [id number] [address]    Clinical concerns: 6 month return Malignant neoplasm of upper-outer quadrant of right breast in female, estrogen receptor negative, review Mammogram.       Regina Tapia CMA              "

## 2022-08-23 NOTE — LETTER
"    8/23/2022         RE: Willa Hernandez  [address] [address]        Dear Colleague,    Thank you for referring your patient, Willa Hernandez, to the [facility]. Please see a copy of my visit note below.    Oncology Rooming Note    August 23, 2022 2:57 PM   Willa Hernandez is a 67 year old female who presents for:    Chief Complaint   Patient presents with     Oncology Clinic Visit     6 month return Malignant neoplasm of upper-outer quadrant of right breast in female, estrogen receptor negative, review Mammogram     Initial Vitals: BP (!) 140/82 (BP Location: Right arm, Patient Position: Sitting, Cuff Size: Adult Regular)   Pulse 91   Temp 99.5  F (37.5  C) (Oral)   Resp 28   Ht 1.549 m (5' 1\")   Wt 74.2 kg (163 lb 9.6 oz)   SpO2 97%   BMI 30.91 kg/m   Estimated body mass index is 30.91 kg/m  as calculated from the following:    Height as of this encounter: 1.549 m (5' 1\").    Weight as of this encounter: 74.2 kg (163 lb 9.6 oz). Body surface area is 1.79 meters squared.  No Pain (0) Comment: Data Unavailable   No LMP recorded.  Allergies reviewed: Yes  Medications reviewed: Yes    Medications: Medication refills not needed today.  Pharmacy name entered into [facility]: [facility][id number] - [place] [id number] [address]    Clinical concerns: 6 month return Malignant neoplasm of upper-outer quadrant of right breast in female, estrogen receptor negative, review Mammogram.       Regina Tapia [facility] Hematology and Oncology Progress Note    Patient: Willa Hernandez  MRN: [id number]  Date of Service: Aug 23, 2022        Assessment and Plan:    Breast cancer    Primary site: Breast (Left)    Staging method: AJCC 7th Edition    Clinical: Stage IA (T1c, N0, cM0) - Signed by Moise Dee MD on 8/13/2014    Pathologic free text: [id number]    Summary: Stage IA (T1c, N0, cM0)     1. Breast cancer: " Overall she is doing well.  Remains clinically stable.  No evidence of recurrent breast cancer.  She had a mammogram just about a week ago which was negative.  These will be continued every August.  She will return to clinic in 6 months.  She is just over 5 years out from her second diagnosis.    2.  Chemotherapy-induced peripheral neuropathy: Stable.  Grade 1.  Continue to follow clinically.  She has not been on treatment for this.    ECOG Performance  0    Diagnosis:    1. Invasive ductal carcinoma of the left breast:  Triple negative. Stage [id number]. Diagnosed July 2014. She had a positive margin at initial surgery. BRCA-.     2.  Invasive ductal carcinoma of the right breast: Triple negative.  Grade 3.  Clinical stage [id number]. Diagnosed July 2017.  MRI shows 3 foci and probable intervening tumor between these 3 foci.  Total size of abnormality in the MRI was 4.2 cm.  One suspicious-appearing node in the axilla.     3.  Pneumonitis: CT on October 11 shows diffuse bilateral mixed airspace and interstitial infiltrates. Possibly due to chemotherapy.  Bronchoscopy was done on October 20.  Cultures negative.  She seemed to respond to steroids.    Treatment:    She had a lumpectomy followed by 4 cycles of Taxotere and Cytoxan. This was completed in October 2014. She then had a reresection for the positive margin. She then had adjuvant radiation completed in February 2015.      Recurrence:  She was treated with neoadjuvant Adriamycin and Cytoxan for her second, most recent diagnosis. Chemotherapy started on August 16, 2017.  Cycle 4 given on September 27.  Weekly Taxol started October 11.  Taxol reduced 25% for neuropathy.  Last was completed on January 17, 2018     Right-sided lumpectomy performed in February 12, 2018.  Pathology showed complete response.  No residual disease.  Adjuvant radiation completed on May 11, 2018    Interim History:    Willa returns today for a follow-up visit.  She has been doing okay since  her last visit.  She reports no new pain or shortness of breath.  No headaches.  Energy and appetite are okay.    Review of Systems:    As above in the history.     Review of Systems otherwise Negative for:  General: chills, fever or night sweats  Psychological: anxiety or depression  Ophthalmic: blurry vision, double vision or loss of vision, vision change  ENT: epistaxis, oral lesions, hearing changes  Hematological and Lymphatic: bleeding, bruising, jaundice, swollen lymph nodes  Endocrine: hot flashes, unexpected weight changes  Respiratory: cough, hemoptysis, orthopnea or shortness of breath/ROSE  Cardiovascular: chest pain, edema, palpitations or PND  Gastrointestinal: abdominal pain, blood in stools, change in bowel habits, constipation, diarrhea or nausea/vomiting  Genito-Urinary: change in urinary stream, incontinence, frequency/urgency  Musculoskeletal: joint pain, stiffness, swelling, muscle pain  Neurological: dizziness, headaches  Dermatological: lumps and rash    Past History:    Past Medical History:   Diagnosis Date     Asthma      Asthma      Breast cancer (H) 2014    left     Breast cancer (H) 2017    right     Breast cancer (H) 2014    left     Breast cancer (H) 2017    right     Bronchitis, chronic (H)      Bronchitis, chronic (H)      Colon polyp 2009    one precancerous polyp     Colon polyp 2009    one precancerous polyp     Cytoxan-induced pulmonary interstitial disease (H)      Cytoxan-induced pulmonary interstitial disease (H)      Disease of thyroid gland      Disease of thyroid gland      GERD (gastroesophageal reflux disease)      GERD (gastroesophageal reflux disease)      Hx antineoplastic chemotherapy 2014     Hx antineoplastic chemotherapy 2014     Hx of radiation therapy 2014     Hx of radiation therapy 2014     Peripheral neuropathy     hands and feet     Peripheral neuropathy     hands and feet     Pneumonia      Pneumonia      Sinusitis      Sinusitis      Uterine polyp       "Uterine polyp      Physical Exam:    BP (!) 140/82 (BP Location: Right arm, Patient Position: Sitting, Cuff Size: Adult Regular)   Pulse 91   Temp 99.5  F (37.5  C) (Oral)   Resp 28   Ht 1.549 m (5' 1\")   Wt 74.2 kg (163 lb 9.6 oz)   SpO2 97%   BMI 30.91 kg/m      General: patient appears stated age of 66 year old. Nontoxic and in no distress.   HEENT: Head: atraumatic, normocephalic. Sclerae anicteric.  Chest:  Normal respiratory effort  Cardiac:  No edema.   Abdomen: abdomen is non-distended  Extremities: normal tone and muscle bulk.  Skin: no lesions or rash on visible skin. Warm and dry.   CNS: alert and oriented. Grossly non-focal.   Psychiatric: normal mood and affect.     Lab Results:    No results found for this or any previous visit (from the past 168 hour(s)).     Imaging:    No results found.      Signed by: Moise Dee MD        Again, thank you for allowing me to participate in the care of your patient.        Sincerely,        Moise Dee MD    "

## 2022-09-01 ENCOUNTER — LAB REQUISITION (OUTPATIENT)
Dept: LAB | Facility: CLINIC | Age: 67
End: 2022-09-01

## 2022-09-01 DIAGNOSIS — K21.9 GASTRO-ESOPHAGEAL REFLUX DISEASE WITHOUT ESOPHAGITIS: ICD-10-CM

## 2022-09-01 PROCEDURE — 82784 ASSAY IGA/IGD/IGG/IGM EACH: CPT | Performed by: FAMILY MEDICINE

## 2022-09-01 PROCEDURE — 83516 IMMUNOASSAY NONANTIBODY: CPT | Performed by: FAMILY MEDICINE

## 2022-09-02 LAB
GLIADIN IGA SER-ACNC: 2.3 U/ML
GLIADIN IGG SER-ACNC: <0.6 U/ML
IGA SERPL-MCNC: 387 MG/DL (ref 84–499)
TTG IGA SER-ACNC: 0.9 U/ML
TTG IGG SER-ACNC: 1.2 U/ML

## 2022-09-30 NOTE — PROGRESS NOTES
[facility] Hematology and Oncology Progress Note    Patient: Willa Hernandez  MRN: [id number]  Date of Service: Aug 23, 2022        Assessment and Plan:    Breast cancer    Primary site: Breast (Left)    Staging method: AJCC 7th Edition    Clinical: Stage IA (T1c, N0, cM0) - Signed by Moise Dee MD on 8/13/2014    Pathologic free text: [id number]    Summary: Stage IA (T1c, N0, cM0)     1. Breast cancer: Overall she is doing well.  Remains clinically stable.  No evidence of recurrent breast cancer.  She had a mammogram just about a week ago which was negative.  These will be continued every August.  She will return to clinic in 6 months.  She is just over 5 years out from her second diagnosis.    2.  Chemotherapy-induced peripheral neuropathy: Stable.  Grade 1.  Continue to follow clinically.  She has not been on treatment for this.    ECOG Performance  0    Diagnosis:    1. Invasive ductal carcinoma of the left breast:  Triple negative. Stage [id number]. Diagnosed July 2014. She had a positive margin at initial surgery. BRCA-.     2.  Invasive ductal carcinoma of the right breast: Triple negative.  Grade 3.  Clinical stage T2N1M0. Diagnosed July 2017.  MRI shows 3 foci and probable intervening tumor between these 3 foci.  Total size of abnormality in the MRI was 4.2 cm.  One suspicious-appearing node in the axilla.     3.  Pneumonitis: CT on October 11 shows diffuse bilateral mixed airspace and interstitial infiltrates. Possibly due to chemotherapy.  Bronchoscopy was done on October 20.  Cultures negative.  She seemed to respond to steroids.    Treatment:    She had a lumpectomy followed by 4 cycles of Taxotere and Cytoxan. This was completed in October 2014. She then had a reresection for the positive margin. She then had adjuvant radiation completed in February 2015.      Recurrence:  She was treated with neoadjuvant Adriamycin and Cytoxan for her second, most recent diagnosis. Chemotherapy  started on August 16, 2017.  Cycle 4 given on September 27.  Weekly Taxol started October 11.  Taxol reduced 25% for neuropathy.  Last was completed on January 17, 2018     Right-sided lumpectomy performed in February 12, 2018.  Pathology showed complete response.  No residual disease.  Adjuvant radiation completed on May 11, 2018    Interim History:    Willa returns today for a follow-up visit.  She has been doing okay since her last visit.  She reports no new pain or shortness of breath.  No headaches.  Energy and appetite are okay.    Review of Systems:    As above in the history.     Review of Systems otherwise Negative for:  General: chills, fever or night sweats  Psychological: anxiety or depression  Ophthalmic: blurry vision, double vision or loss of vision, vision change  ENT: epistaxis, oral lesions, hearing changes  Hematological and Lymphatic: bleeding, bruising, jaundice, swollen lymph nodes  Endocrine: hot flashes, unexpected weight changes  Respiratory: cough, hemoptysis, orthopnea or shortness of breath/ROSE  Cardiovascular: chest pain, edema, palpitations or PND  Gastrointestinal: abdominal pain, blood in stools, change in bowel habits, constipation, diarrhea or nausea/vomiting  Genito-Urinary: change in urinary stream, incontinence, frequency/urgency  Musculoskeletal: joint pain, stiffness, swelling, muscle pain  Neurological: dizziness, headaches  Dermatological: lumps and rash    Past History:    Past Medical History:   Diagnosis Date     Asthma      Asthma      Breast cancer (H) 2014    left     Breast cancer (H) 2017    right     Breast cancer (H) 2014    left     Breast cancer (H) 2017    right     Bronchitis, chronic (H)      Bronchitis, chronic (H)      Colon polyp 2009    one precancerous polyp     Colon polyp 2009    one precancerous polyp     Cytoxan-induced pulmonary interstitial disease (H)      Cytoxan-induced pulmonary interstitial disease (H)      Disease of thyroid gland       "Disease of thyroid gland      GERD (gastroesophageal reflux disease)      GERD (gastroesophageal reflux disease)      Hx antineoplastic chemotherapy 2014     Hx antineoplastic chemotherapy 2014     Hx of radiation therapy 2014     Hx of radiation therapy 2014     Peripheral neuropathy     hands and feet     Peripheral neuropathy     hands and feet     Pneumonia      Pneumonia      Sinusitis      Sinusitis      Uterine polyp      Uterine polyp      Physical Exam:    BP (!) 140/82 (BP Location: Right arm, Patient Position: Sitting, Cuff Size: Adult Regular)   Pulse 91   Temp 99.5  F (37.5  C) (Oral)   Resp 28   Ht 1.549 m (5' 1\")   Wt 74.2 kg (163 lb 9.6 oz)   SpO2 97%   BMI 30.91 kg/m      General: patient appears stated age of 66 year old. Nontoxic and in no distress.   HEENT: Head: atraumatic, normocephalic. Sclerae anicteric.  Chest:  Normal respiratory effort  Cardiac:  No edema.   Abdomen: abdomen is non-distended  Extremities: normal tone and muscle bulk.  Skin: no lesions or rash on visible skin. Warm and dry.   CNS: alert and oriented. Grossly non-focal.   Psychiatric: normal mood and affect.     Lab Results:    No results found for this or any previous visit (from the past 168 hour(s)).     Imaging:    No results found.      Signed by: Moise Dee MD    "

## 2022-10-01 ENCOUNTER — HEALTH MAINTENANCE LETTER (OUTPATIENT)
Age: 67
End: 2022-10-01

## 2022-10-19 ENCOUNTER — LAB REQUISITION (OUTPATIENT)
Dept: LAB | Facility: CLINIC | Age: 67
End: 2022-10-19

## 2022-10-19 DIAGNOSIS — Z13.220 ENCOUNTER FOR SCREENING FOR LIPOID DISORDERS: ICD-10-CM

## 2022-10-19 LAB
CHOLEST SERPL-MCNC: 195 MG/DL
HDLC SERPL-MCNC: 49 MG/DL
LDLC SERPL CALC-MCNC: 111 MG/DL
NONHDLC SERPL-MCNC: 146 MG/DL
TRIGL SERPL-MCNC: 174 MG/DL

## 2022-10-19 PROCEDURE — 80061 LIPID PANEL: CPT | Performed by: FAMILY MEDICINE

## 2023-02-15 DIAGNOSIS — C50.411 MALIGNANT NEOPLASM OF UPPER-OUTER QUADRANT OF RIGHT BREAST IN FEMALE, ESTROGEN RECEPTOR NEGATIVE (H): Primary | ICD-10-CM

## 2023-02-15 DIAGNOSIS — Z17.1 MALIGNANT NEOPLASM OF UPPER-OUTER QUADRANT OF RIGHT BREAST IN FEMALE, ESTROGEN RECEPTOR NEGATIVE (H): Primary | ICD-10-CM

## 2023-03-24 ENCOUNTER — LAB (OUTPATIENT)
Dept: INFUSION THERAPY | Facility: HOSPITAL | Age: 68
End: 2023-03-24
Attending: INTERNAL MEDICINE
Payer: COMMERCIAL

## 2023-03-24 ENCOUNTER — ONCOLOGY VISIT (OUTPATIENT)
Dept: ONCOLOGY | Facility: HOSPITAL | Age: 68
End: 2023-03-24
Attending: INTERNAL MEDICINE
Payer: COMMERCIAL

## 2023-03-24 VITALS
BODY MASS INDEX: 31.15 KG/M2 | WEIGHT: 165 LBS | HEART RATE: 83 BPM | DIASTOLIC BLOOD PRESSURE: 87 MMHG | HEIGHT: 61 IN | TEMPERATURE: 98.9 F | OXYGEN SATURATION: 98 % | SYSTOLIC BLOOD PRESSURE: 133 MMHG | RESPIRATION RATE: 16 BRPM

## 2023-03-24 DIAGNOSIS — Z17.1 MALIGNANT NEOPLASM OF UPPER-OUTER QUADRANT OF RIGHT BREAST IN FEMALE, ESTROGEN RECEPTOR NEGATIVE (H): ICD-10-CM

## 2023-03-24 DIAGNOSIS — Z17.1 MALIGNANT NEOPLASM OF UPPER-OUTER QUADRANT OF RIGHT BREAST IN FEMALE, ESTROGEN RECEPTOR NEGATIVE (H): Primary | ICD-10-CM

## 2023-03-24 DIAGNOSIS — C50.411 MALIGNANT NEOPLASM OF UPPER-OUTER QUADRANT OF RIGHT BREAST IN FEMALE, ESTROGEN RECEPTOR NEGATIVE (H): ICD-10-CM

## 2023-03-24 DIAGNOSIS — K59.00 CONSTIPATION, UNSPECIFIED CONSTIPATION TYPE: ICD-10-CM

## 2023-03-24 DIAGNOSIS — Z12.31 ENCOUNTER FOR SCREENING MAMMOGRAM FOR MALIGNANT NEOPLASM OF BREAST: ICD-10-CM

## 2023-03-24 DIAGNOSIS — C50.411 MALIGNANT NEOPLASM OF UPPER-OUTER QUADRANT OF RIGHT BREAST IN FEMALE, ESTROGEN RECEPTOR NEGATIVE (H): Primary | ICD-10-CM

## 2023-03-24 LAB
ALBUMIN SERPL BCG-MCNC: 3.8 G/DL (ref 3.5–5.2)
ALP SERPL-CCNC: 117 U/L (ref 35–104)
ALT SERPL W P-5'-P-CCNC: 10 U/L (ref 10–35)
ANION GAP SERPL CALCULATED.3IONS-SCNC: 10 MMOL/L (ref 7–15)
AST SERPL W P-5'-P-CCNC: 32 U/L (ref 10–35)
BASOPHILS # BLD AUTO: 0 10E3/UL (ref 0–0.2)
BASOPHILS NFR BLD AUTO: 0 %
BILIRUB SERPL-MCNC: 0.2 MG/DL
BUN SERPL-MCNC: 23 MG/DL (ref 8–23)
CALCIUM SERPL-MCNC: 9.4 MG/DL (ref 8.8–10.2)
CHLORIDE SERPL-SCNC: 99 MMOL/L (ref 98–107)
CREAT SERPL-MCNC: 0.97 MG/DL (ref 0.51–0.95)
DEPRECATED HCO3 PLAS-SCNC: 29 MMOL/L (ref 22–29)
EOSINOPHIL # BLD AUTO: 0.2 10E3/UL (ref 0–0.7)
EOSINOPHIL NFR BLD AUTO: 2 %
ERYTHROCYTE [DISTWIDTH] IN BLOOD BY AUTOMATED COUNT: 14.7 % (ref 10–15)
GFR SERPL CREATININE-BSD FRML MDRD: 64 ML/MIN/1.73M2
GLUCOSE SERPL-MCNC: 89 MG/DL (ref 70–99)
HCT VFR BLD AUTO: 41.4 % (ref 35–47)
HGB BLD-MCNC: 13.2 G/DL (ref 11.7–15.7)
IMM GRANULOCYTES # BLD: 0 10E3/UL
IMM GRANULOCYTES NFR BLD: 0 %
LYMPHOCYTES # BLD AUTO: 2.1 10E3/UL (ref 0.8–5.3)
LYMPHOCYTES NFR BLD AUTO: 25 %
MCH RBC QN AUTO: 27.7 PG (ref 26.5–33)
MCHC RBC AUTO-ENTMCNC: 31.9 G/DL (ref 31.5–36.5)
MCV RBC AUTO: 87 FL (ref 78–100)
MONOCYTES # BLD AUTO: 0.7 10E3/UL (ref 0–1.3)
MONOCYTES NFR BLD AUTO: 9 %
NEUTROPHILS # BLD AUTO: 5.3 10E3/UL (ref 1.6–8.3)
NEUTROPHILS NFR BLD AUTO: 64 %
NRBC # BLD AUTO: 0 10E3/UL
NRBC BLD AUTO-RTO: 0 /100
PLATELET # BLD AUTO: 266 10E3/UL (ref 150–450)
POTASSIUM SERPL-SCNC: 4.1 MMOL/L (ref 3.4–5.3)
PROT SERPL-MCNC: 7.3 G/DL (ref 6.4–8.3)
RBC # BLD AUTO: 4.76 10E6/UL (ref 3.8–5.2)
SODIUM SERPL-SCNC: 138 MMOL/L (ref 136–145)
WBC # BLD AUTO: 8.3 10E3/UL (ref 4–11)

## 2023-03-24 PROCEDURE — 80053 COMPREHEN METABOLIC PANEL: CPT

## 2023-03-24 PROCEDURE — 85025 COMPLETE CBC W/AUTO DIFF WBC: CPT

## 2023-03-24 PROCEDURE — 99214 OFFICE O/P EST MOD 30 MIN: CPT | Performed by: NURSE PRACTITIONER

## 2023-03-24 PROCEDURE — 36415 COLL VENOUS BLD VENIPUNCTURE: CPT

## 2023-03-24 PROCEDURE — G0463 HOSPITAL OUTPT CLINIC VISIT: HCPCS | Performed by: NURSE PRACTITIONER

## 2023-03-24 RX ORDER — LANOLIN ALCOHOL/MO/W.PET/CERES
1000 CREAM (GRAM) TOPICAL DAILY
COMMUNITY

## 2023-03-24 RX ORDER — MONTELUKAST SODIUM 10 MG/1
1 TABLET ORAL DAILY
COMMUNITY
Start: 2023-01-15

## 2023-03-24 RX ORDER — IBUPROFEN 200 MG
400 TABLET ORAL PRN
COMMUNITY

## 2023-03-24 ASSESSMENT — PAIN SCALES - GENERAL: PAINLEVEL: NO PAIN (1)

## 2023-03-24 NOTE — PROGRESS NOTES
"Oncology Rooming Note    March 24, 2023 9:50 AM   Willa Hernandez is a 67 year old female who presents for:    Chief Complaint   Patient presents with     Oncology Clinic Visit     6 month follow up with labs related to Malignant neoplasm of upper-outer quadrant of right breast in female, estrogen receptor negative     Initial Vitals: BP (!) 136/90 (BP Location: Right arm, Patient Position: Sitting, Cuff Size: Adult Regular)   Pulse 83   Temp 98.9  F (37.2  C) (Tympanic)   Resp 16   Ht 1.549 m (5' 1\")   Wt 74.8 kg (165 lb)   SpO2 98%   BMI 31.18 kg/m   Estimated body mass index is 31.18 kg/m  as calculated from the following:    Height as of this encounter: 1.549 m (5' 1\").    Weight as of this encounter: 74.8 kg (165 lb). Body surface area is 1.79 meters squared.  No Pain (1) Comment: Data Unavailable   No LMP recorded.  Allergies reviewed: Yes  Medications reviewed: Yes    Medications: Medication refills not needed today.  Pharmacy name entered into [facility]: [facility][id number] - [place] [id number]    Clinical concerns: 6 month follow up with labs related to Malignant neoplasm of upper-outer quadrant of right breast in female, estrogen receptor negative    JAIME VARGAS CMA            "

## 2023-03-24 NOTE — PROGRESS NOTES
[facility] Hematology and Oncology Progress Note    Patient: Willa Hernandez  MRN: [id number]  Date of Service: Mar 24, 2023        Assessment and Plan:    Breast cancer    Primary site: Breast (Left)    Staging method: AJCC 7th Edition    Clinical: Stage IA (T1c, N0, cM0) - Signed by Moise Dee MD on 8/13/2014    Pathologic free text: [id number]    Summary: Stage IA (T1c, N0, cM0)     1. Stage IA left breast cancer (triple negative) (2014):   2. T2-N1 right breast cancer (triple negative), contralateral recurrence (2017)  Diagnosed almost 9 years ago with initial left breast cancer.   Had contralateral right recurrence 5.5 yrs ago. Both triple negative.   S/P chemo, lumpectomies and adjuvant RT to both breasts.     Overall she is doing well.    Remains clinically stable.    No evidence of recurrent breast cancer.    Mammogram August 2022 negative.   CBC WNL. Mildly elevated alk phos is stable (117), rest LFTs WNL.     Plan:  -Annual bilateral mammograms every August.   -Continue every 6 month visits for a few more years. Recheck CMP in 6 mths.  -Continue surveillance in Oncology 10 yrs from 2nd diagnosis (2027)    2.  Chemotherapy-induced peripheral neuropathy, gr 1:   Stable.  Grade 1.  Continue to follow clinically.  She has not been on treatment for this.    3.  Mild creatinine elevation  Very mild elevation. Likely a bit dehydrated today. Will work on fluids.     4.  Constipation  Currently well-managed. No worrisome features. Colonoscopy 3 yrs ago normal per patient. Hgb normal.    Plan:  -Continue diet modification and increase oral fluids. Increase activity  -OTC laxatives/softeners, as needed  -Follow-up with PCP if needed    ECOG Performance  0    Diagnosis:    1. Invasive ductal carcinoma of the left breast:  Triple negative. Stage [id number]. Diagnosed July 2014. She had a positive margin at initial surgery. BRCA-.     2.  Invasive ductal carcinoma of the right breast: Triple negative.   Grade 3.  Clinical stage T2N1M0. Diagnosed July 2017.  MRI shows 3 foci and probable intervening tumor between these 3 foci.  Total size of abnormality in the MRI was 4.2 cm.  One suspicious-appearing node in the axilla.     3.  Pneumonitis: CT on October 11 shows diffuse bilateral mixed airspace and interstitial infiltrates. Possibly due to chemotherapy.  Bronchoscopy was done on October 20.  Cultures negative.  She seemed to respond to steroids.    Treatment:    She had a lumpectomy followed by 4 cycles of Taxotere and Cytoxan. This was completed in October 2014. She then had a reresection for the positive margin. She then had adjuvant radiation completed in February 2015.      Recurrence:  She was treated with neoadjuvant Adriamycin and Cytoxan for her second, most recent diagnosis. Chemotherapy started on August 16, 2017.  Cycle 4 given on September 27.  Weekly Taxol started October 11.  Taxol reduced 25% for neuropathy.  Last was completed on January 17, 2018     Right-sided lumpectomy performed in February 12, 2018.  Pathology showed complete response.  No residual disease.  Adjuvant radiation completed on May 11, 2018    Interim History:    Willa returns today for a 6-month follow-up visit.  She is now 8.5 yrs from her original left breast cancer and 5.5 yrs from her contralateral right breast cancer recurrence. Both were triple negative.     She has been doing okay since her last visit.    She's been a bit more constipated than usual the past few months. Improved through diet and occasional laxative. She's a bit concerned given her family hx of colon cancer. Normal colonoscopy 3 yr ago (per pt). No abd pain, nausea, melena/hematochezia, weight loss.     She reports no new pain or shortness of breath.    No headaches.    Energy and appetite are okay. Stable weight.   Chemo-induced peripheral neuropathy is stable, mild.  Stable left breast lymphedema.     Past History:    Past Medical History:   Diagnosis  "Date     Asthma      Asthma      Breast cancer (H) 2014    left     Breast cancer (H) 2017    right     Breast cancer (H) 2014    left     Breast cancer (H) 2017    right     Bronchitis, chronic (H)      Bronchitis, chronic (H)      Colon polyp 2009    one precancerous polyp     Colon polyp 2009    one precancerous polyp     Cytoxan-induced pulmonary interstitial disease (H)      Cytoxan-induced pulmonary interstitial disease (H)      Disease of thyroid gland      Disease of thyroid gland      GERD (gastroesophageal reflux disease)      GERD (gastroesophageal reflux disease)      Hx antineoplastic chemotherapy 2014     Hx antineoplastic chemotherapy 2014     Hx of radiation therapy 2014     Hx of radiation therapy 2014     Peripheral neuropathy     hands and feet     Peripheral neuropathy     hands and feet     Pneumonia      Pneumonia      Sinusitis      Sinusitis      Uterine polyp      Uterine polyp      Physical Exam:    /87   Pulse 83   Temp 98.9  F (37.2  C) (Tympanic)   Resp 16   Ht 1.549 m (5' 1\")   Wt 74.8 kg (165 lb)   SpO2 98%   BMI 31.18 kg/m      General: patient appears stated age of 66 year old. Nontoxic and in no distress.   HEENT: Head: atraumatic, normocephalic. Sclerae anicteric.  Lymph: No cervical nor axillary adenopathy  Chest:  Normal respiratory effort. Clear lungs bilaterally.  Breast: Left breast post-surgical and RT fibrosis, no masses. Right breast normal.   Cardiac:  No edema.   Abdomen: abdomen is non-distended, non-tender. No hepatomegaly. Bowel sounds present.   Extremities: normal tone and muscle bulk.  Skin: no lesions or rash on visible skin. Warm and dry.   CNS: alert and oriented. Grossly non-focal.   Psychiatric: normal mood and affect.     Lab Results:    Recent Results (from the past 168 hour(s))   Comprehensive metabolic panel   Result Value Ref Range    Sodium 138 136 - 145 mmol/L    Potassium 4.1 3.4 - 5.3 mmol/L    Chloride 99 98 - 107 mmol/L    Carbon Dioxide " (CO2) 29 22 - 29 mmol/L    Anion Gap 10 7 - 15 mmol/L    Urea Nitrogen 23.0 8.0 - 23.0 mg/dL    Creatinine 0.97 (H) 0.51 - 0.95 mg/dL    Calcium 9.4 8.8 - 10.2 mg/dL    Glucose 89 70 - 99 mg/dL    Alkaline Phosphatase 117 (H) 35 - 104 U/L    AST 32 10 - 35 U/L    ALT 10 10 - 35 U/L    Protein Total 7.3 6.4 - 8.3 g/dL    Albumin 3.8 3.5 - 5.2 g/dL    Bilirubin Total 0.2 <=1.2 mg/dL    GFR Estimate 64 >60 mL/min/1.73m2   CBC with platelets and differential   Result Value Ref Range    WBC Count 8.3 4.0 - 11.0 10e3/uL    RBC Count 4.76 3.80 - 5.20 10e6/uL    Hemoglobin 13.2 11.7 - 15.7 g/dL    Hematocrit 41.4 35.0 - 47.0 %    MCV 87 78 - 100 fL    MCH 27.7 26.5 - 33.0 pg    MCHC 31.9 31.5 - 36.5 g/dL    RDW 14.7 10.0 - 15.0 %    Platelet Count 266 150 - 450 10e3/uL    % Neutrophils 64 %    % Lymphocytes 25 %    % Monocytes 9 %    % Eosinophils 2 %    % Basophils 0 %    % Immature Granulocytes 0 %    NRBCs per 100 WBC 0 <1 /100    Absolute Neutrophils 5.3 1.6 - 8.3 10e3/uL    Absolute Lymphocytes 2.1 0.8 - 5.3 10e3/uL    Absolute Monocytes 0.7 0.0 - 1.3 10e3/uL    Absolute Eosinophils 0.2 0.0 - 0.7 10e3/uL    Absolute Basophils 0.0 0.0 - 0.2 10e3/uL    Absolute Immature Granulocytes 0.0 <=0.4 10e3/uL    Absolute NRBCs 0.0 10e3/uL        Imaging:    No results found.    Total time 35 minutes, to include face to face visit, review of EMR, ordering, documentation and coordination of care on date of service      Signed by: Yaquelin Martinez NP

## 2023-03-24 NOTE — LETTER
3/24/2023         RE: Willa Hernandez  [address] [facility] [address]        Dear Colleague,    Thank you for referring your patient, Willa Hernandez, to the [facility]. Please see a copy of my visit note below.    [facility] Hematology and Oncology Progress Note    Patient: Willa Hernandez  MRN: [id number]  Date of Service: Mar 24, 2023        Assessment and Plan:    Breast cancer    Primary site: Breast (Left)    Staging method: AJCC 7th Edition    Clinical: Stage IA (T1c, N0, cM0) - Signed by Moise Dee MD on 8/13/2014    Pathologic free text: [id number]    Summary: Stage IA (T1c, N0, cM0)     1. Stage IA left breast cancer (triple negative) (2014):   2. T2-N1 right breast cancer (triple negative), contralateral recurrence (2017)  Diagnosed almost 9 years ago with initial left breast cancer.   Had contralateral right recurrence 5.5 yrs ago. Both triple negative.   S/P chemo, lumpectomies and adjuvant RT to both breasts.     Overall she is doing well.    Remains clinically stable.    No evidence of recurrent breast cancer.    Mammogram August 2022 negative.   CBC WNL. Mildly elevated alk phos is stable (117), rest LFTs WNL.     Plan:  -Annual bilateral mammograms every August.   -Continue every 6 month visits for a few more years. Recheck CMP in 6 mths.  -Continue surveillance in Oncology 10 yrs from 2nd diagnosis (2027)    2.  Chemotherapy-induced peripheral neuropathy, gr 1:   Stable.  Grade 1.  Continue to follow clinically.  She has not been on treatment for this.    3.  Mild creatinine elevation  Very mild elevation. Likely a bit dehydrated today. Will work on fluids.     4.  Constipation  Currently well-managed. No worrisome features. Colonoscopy 3 yrs ago normal per patient. Hgb normal.    Plan:  -Continue diet modification and increase oral fluids. Increase activity  -OTC laxatives/softeners, as needed  -Follow-up with PCP if  needed    ECOG Performance  0    Diagnosis:    1. Invasive ductal carcinoma of the left breast:  Triple negative. Stage [id number]. Diagnosed July 2014. She had a positive margin at initial surgery. BRCA-.     2.  Invasive ductal carcinoma of the right breast: Triple negative.  Grade 3.  Clinical stage T2N1M0. Diagnosed July 2017.  MRI shows 3 foci and probable intervening tumor between these 3 foci.  Total size of abnormality in the MRI was 4.2 cm.  One suspicious-appearing node in the axilla.     3.  Pneumonitis: CT on October 11 shows diffuse bilateral mixed airspace and interstitial infiltrates. Possibly due to chemotherapy.  Bronchoscopy was done on October 20.  Cultures negative.  She seemed to respond to steroids.    Treatment:    She had a lumpectomy followed by 4 cycles of Taxotere and Cytoxan. This was completed in October 2014. She then had a reresection for the positive margin. She then had adjuvant radiation completed in February 2015.      Recurrence:  She was treated with neoadjuvant Adriamycin and Cytoxan for her second, most recent diagnosis. Chemotherapy started on August 16, 2017.  Cycle 4 given on September 27.  Weekly Taxol started October 11.  Taxol reduced 25% for neuropathy.  Last was completed on January 17, 2018     Right-sided lumpectomy performed in February 12, 2018.  Pathology showed complete response.  No residual disease.  Adjuvant radiation completed on May 11, 2018    Interim History:    Willa returns today for a 6-month follow-up visit.  She is now 8.5 yrs from her original left breast cancer and 5.5 yrs from her contralateral right breast cancer recurrence. Both were triple negative.     She has been doing okay since her last visit.    She's been a bit more constipated than usual the past few months. Improved through diet and occasional laxative. She's a bit concerned given her family hx of colon cancer. Normal colonoscopy 3 yr ago (per pt). No abd pain, nausea,  "melena/hematochezia, weight loss.     She reports no new pain or shortness of breath.    No headaches.    Energy and appetite are okay. Stable weight.   Chemo-induced peripheral neuropathy is stable, mild.  Stable left breast lymphedema.     Past History:    Past Medical History:   Diagnosis Date     Asthma      Asthma      Breast cancer (H) 2014    left     Breast cancer (H) 2017    right     Breast cancer (H) 2014    left     Breast cancer (H) 2017    right     Bronchitis, chronic (H)      Bronchitis, chronic (H)      Colon polyp 2009    one precancerous polyp     Colon polyp 2009    one precancerous polyp     Cytoxan-induced pulmonary interstitial disease (H)      Cytoxan-induced pulmonary interstitial disease (H)      Disease of thyroid gland      Disease of thyroid gland      GERD (gastroesophageal reflux disease)      GERD (gastroesophageal reflux disease)      Hx antineoplastic chemotherapy 2014     Hx antineoplastic chemotherapy 2014     Hx of radiation therapy 2014     Hx of radiation therapy 2014     Peripheral neuropathy     hands and feet     Peripheral neuropathy     hands and feet     Pneumonia      Pneumonia      Sinusitis      Sinusitis      Uterine polyp      Uterine polyp      Physical Exam:    /87   Pulse 83   Temp 98.9  F (37.2  C) (Tympanic)   Resp 16   Ht 1.549 m (5' 1\")   Wt 74.8 kg (165 lb)   SpO2 98%   BMI 31.18 kg/m      General: patient appears stated age of 66 year old. Nontoxic and in no distress.   HEENT: Head: atraumatic, normocephalic. Sclerae anicteric.  Lymph: No cervical nor axillary adenopathy  Chest:  Normal respiratory effort. Clear lungs bilaterally.  Breast: Left breast post-surgical and RT fibrosis, no masses. Right breast normal.   Cardiac:  No edema.   Abdomen: abdomen is non-distended, non-tender. No hepatomegaly. Bowel sounds present.   Extremities: normal tone and muscle bulk.  Skin: no lesions or rash on visible skin. Warm and dry.   CNS: alert and " oriented. Grossly non-focal.   Psychiatric: normal mood and affect.     Lab Results:    Recent Results (from the past 168 hour(s))   Comprehensive metabolic panel   Result Value Ref Range    Sodium 138 136 - 145 mmol/L    Potassium 4.1 3.4 - 5.3 mmol/L    Chloride 99 98 - 107 mmol/L    Carbon Dioxide (CO2) 29 22 - 29 mmol/L    Anion Gap 10 7 - 15 mmol/L    Urea Nitrogen 23.0 8.0 - 23.0 mg/dL    Creatinine 0.97 (H) 0.51 - 0.95 mg/dL    Calcium 9.4 8.8 - 10.2 mg/dL    Glucose 89 70 - 99 mg/dL    Alkaline Phosphatase 117 (H) 35 - 104 U/L    AST 32 10 - 35 U/L    ALT 10 10 - 35 U/L    Protein Total 7.3 6.4 - 8.3 g/dL    Albumin 3.8 3.5 - 5.2 g/dL    Bilirubin Total 0.2 <=1.2 mg/dL    GFR Estimate 64 >60 mL/min/1.73m2   CBC with platelets and differential   Result Value Ref Range    WBC Count 8.3 4.0 - 11.0 10e3/uL    RBC Count 4.76 3.80 - 5.20 10e6/uL    Hemoglobin 13.2 11.7 - 15.7 g/dL    Hematocrit 41.4 35.0 - 47.0 %    MCV 87 78 - 100 fL    MCH 27.7 26.5 - 33.0 pg    MCHC 31.9 31.5 - 36.5 g/dL    RDW 14.7 10.0 - 15.0 %    Platelet Count 266 150 - 450 10e3/uL    % Neutrophils 64 %    % Lymphocytes 25 %    % Monocytes 9 %    % Eosinophils 2 %    % Basophils 0 %    % Immature Granulocytes 0 %    NRBCs per 100 WBC 0 <1 /100    Absolute Neutrophils 5.3 1.6 - 8.3 10e3/uL    Absolute Lymphocytes 2.1 0.8 - 5.3 10e3/uL    Absolute Monocytes 0.7 0.0 - 1.3 10e3/uL    Absolute Eosinophils 0.2 0.0 - 0.7 10e3/uL    Absolute Basophils 0.0 0.0 - 0.2 10e3/uL    Absolute Immature Granulocytes 0.0 <=0.4 10e3/uL    Absolute NRBCs 0.0 10e3/uL        Imaging:    No results found.    Total time 35 minutes, to include face to face visit, review of EMR, ordering, documentation and coordination of care on date of service      Signed by: Yaquelin Martinez NP      Oncology Rooming Note    March 24, 2023 9:50 AM   Willa Bakernilson Hernandez is a 67 year old female who presents for:    Chief Complaint   Patient presents with     Oncology Clinic Visit      "6 month follow up with labs related to Malignant neoplasm of upper-outer quadrant of right breast in female, estrogen receptor negative     Initial Vitals: BP (!) 136/90 (BP Location: Right arm, Patient Position: Sitting, Cuff Size: Adult Regular)   Pulse 83   Temp 98.9  F (37.2  C) (Tympanic)   Resp 16   Ht 1.549 m (5' 1\")   Wt 74.8 kg (165 lb)   SpO2 98%   BMI 31.18 kg/m   Estimated body mass index is 31.18 kg/m  as calculated from the following:    Height as of this encounter: 1.549 m (5' 1\").    Weight as of this encounter: 74.8 kg (165 lb). Body surface area is 1.79 meters squared.  No Pain (1) Comment: Data Unavailable   No LMP recorded.  Allergies reviewed: Yes  Medications reviewed: Yes    Medications: Medication refills not needed today.  Pharmacy name entered into [employer]: [id number] - [place] [id number] [address]    Clinical concerns: 6 month follow up with labs related to Malignant neoplasm of upper-outer quadrant of right breast in female, estrogen receptor negative    JAIME VARGAS CMA                Again, thank you for allowing me to participate in the care of your patient.        Sincerely,        Yaquelin Martinez NP    "

## 2023-08-16 ENCOUNTER — ANCILLARY PROCEDURE (OUTPATIENT)
Dept: MAMMOGRAPHY | Facility: CLINIC | Age: 68
End: 2023-08-16
Attending: NURSE PRACTITIONER
Payer: COMMERCIAL

## 2023-08-16 DIAGNOSIS — Z12.31 ENCOUNTER FOR SCREENING MAMMOGRAM FOR MALIGNANT NEOPLASM OF BREAST: ICD-10-CM

## 2023-08-16 PROCEDURE — 77067 SCR MAMMO BI INCL CAD: CPT

## 2023-09-27 ENCOUNTER — ONCOLOGY VISIT (OUTPATIENT)
Dept: ONCOLOGY | Facility: HOSPITAL | Age: 68
End: 2023-09-27
Attending: INTERNAL MEDICINE
Payer: COMMERCIAL

## 2023-09-27 ENCOUNTER — LAB (OUTPATIENT)
Dept: INFUSION THERAPY | Facility: HOSPITAL | Age: 68
End: 2023-09-27
Attending: INTERNAL MEDICINE
Payer: COMMERCIAL

## 2023-09-27 VITALS
RESPIRATION RATE: 18 BRPM | BODY MASS INDEX: 31.89 KG/M2 | WEIGHT: 168.9 LBS | OXYGEN SATURATION: 98 % | HEIGHT: 61 IN | DIASTOLIC BLOOD PRESSURE: 85 MMHG | TEMPERATURE: 98.6 F | SYSTOLIC BLOOD PRESSURE: 145 MMHG | HEART RATE: 78 BPM

## 2023-09-27 DIAGNOSIS — C50.411 MALIGNANT NEOPLASM OF UPPER-OUTER QUADRANT OF RIGHT BREAST IN FEMALE, ESTROGEN RECEPTOR NEGATIVE (H): ICD-10-CM

## 2023-09-27 DIAGNOSIS — R14.0 ABDOMINAL BLOATING: Primary | ICD-10-CM

## 2023-09-27 DIAGNOSIS — Z17.1 MALIGNANT NEOPLASM OF UPPER-OUTER QUADRANT OF RIGHT BREAST IN FEMALE, ESTROGEN RECEPTOR NEGATIVE (H): ICD-10-CM

## 2023-09-27 LAB
ALBUMIN SERPL BCG-MCNC: 4 G/DL (ref 3.5–5.2)
ALP SERPL-CCNC: 118 U/L (ref 35–104)
ALT SERPL W P-5'-P-CCNC: 11 U/L (ref 0–50)
ANION GAP SERPL CALCULATED.3IONS-SCNC: 10 MMOL/L (ref 7–15)
AST SERPL W P-5'-P-CCNC: 27 U/L (ref 0–45)
BILIRUB SERPL-MCNC: 0.3 MG/DL
BUN SERPL-MCNC: 20.6 MG/DL (ref 8–23)
CALCIUM SERPL-MCNC: 9.4 MG/DL (ref 8.8–10.2)
CHLORIDE SERPL-SCNC: 102 MMOL/L (ref 98–107)
CREAT SERPL-MCNC: 0.96 MG/DL (ref 0.51–0.95)
DEPRECATED HCO3 PLAS-SCNC: 27 MMOL/L (ref 22–29)
EGFRCR SERPLBLD CKD-EPI 2021: 64 ML/MIN/1.73M2
GLUCOSE SERPL-MCNC: 116 MG/DL (ref 70–99)
POTASSIUM SERPL-SCNC: 5.3 MMOL/L (ref 3.4–5.3)
PROT SERPL-MCNC: 7.6 G/DL (ref 6.4–8.3)
SODIUM SERPL-SCNC: 139 MMOL/L (ref 135–145)

## 2023-09-27 PROCEDURE — 36415 COLL VENOUS BLD VENIPUNCTURE: CPT

## 2023-09-27 PROCEDURE — G0463 HOSPITAL OUTPT CLINIC VISIT: HCPCS | Performed by: INTERNAL MEDICINE

## 2023-09-27 PROCEDURE — 80053 COMPREHEN METABOLIC PANEL: CPT

## 2023-09-27 PROCEDURE — 99214 OFFICE O/P EST MOD 30 MIN: CPT | Performed by: INTERNAL MEDICINE

## 2023-09-27 RX ORDER — PROCHLORPERAZINE MALEATE 5 MG
5 TABLET ORAL 3 TIMES DAILY PRN
COMMUNITY
Start: 2022-07-21

## 2023-09-27 ASSESSMENT — PAIN SCALES - GENERAL: PAINLEVEL: NO PAIN (0)

## 2023-09-27 NOTE — PROGRESS NOTES
[facility] Hematology and Oncology Progress Note    Patient: Willa Hernandez  MRN: [id number]  Date of Service: Sep 27, 2023        Assessment and Plan:    Breast cancer    Primary site: Breast (Left)    Staging method: AJCC 7th Edition    Clinical: Stage IA (T1c, N0, cM0) - Signed by Moise Dee MD on 8/13/2014    Pathologic free text: [id number]    Summary: Stage IA (T1c, N0, cM0)     1. Breast cancer: She has a history of triple negative breast cancer.  There is no clinical evidence of recurrence today.  CMP from today was reviewed and shows a sodium of 139, potassium 5.3, creatinine 0.96, and alkaline phosphatase stable at 118.  ALT was 11.  She is now over 6 years from her most recent diagnosis.  Likelihood of recurrence is low at this point.  We will rehab return to clinic in 12 months.    2.  Abdominal discomfort: She complains of lower abdominal discomfort and bloating.  Intermittent diarrhea.  Her abdominal exam is benign.  We are going to get an abdominal and pelvic ultrasound to make sure there is no evidence of ovarian neoplasm or other pathology.    ECOG Performance  0    Diagnosis:    1. Invasive ductal carcinoma of the left breast:  Triple negative. Stage [id number]. Diagnosed July 2014. She had a positive margin at initial surgery. BRCA-.     2.  Invasive ductal carcinoma of the right breast: Triple negative.  Grade 3.  Clinical stage T2N1M0. Diagnosed July 2017.  MRI shows 3 foci and probable intervening tumor between these 3 foci.  Total size of abnormality in the MRI was 4.2 cm.  One suspicious-appearing node in the axilla.     3.  Pneumonitis: CT on October 11 shows diffuse bilateral mixed airspace and interstitial infiltrates. Possibly due to chemotherapy.  Bronchoscopy was done on October 20. Cultures negative.  She seemed to respond to steroids.    Treatment:    She had a lumpectomy followed by 4 cycles of Taxotere and Cytoxan. This was completed in October 2014. She then had  a reresection for the positive margin. She then had adjuvant radiation completed in February 2015.      Recurrence:  She was treated with neoadjuvant Adriamycin and Cytoxan for her second, most recent diagnosis. Chemotherapy started on August 16, 2017.  Cycle 4 given on September 27.  Weekly Taxol started October 11.  Taxol reduced 25% for neuropathy.  Last was completed on January 17, 2018     Right-sided lumpectomy performed in February 12, 2018.  Pathology showed complete response.  No residual disease.  Adjuvant radiation completed on May 11, 2018    Interim History:    Willa returns today for a follow-up visit.  Overall she is been doing okay.  Denies any new areas of pain, shortness of breath, or headaches.  She has constipation occasionally.  Some inconsistent bowel habits.  No nausea.  She also complains of some lower abdominal discomfort.  Off-and-on.  Bloating.    Review of Systems:    As above in the history.     Review of Systems otherwise Negative for:  General: chills, fever or night sweats  Psychological: anxiety or depression  Ophthalmic: blurry vision, double vision or loss of vision, vision change  ENT: epistaxis, oral lesions, hearing changes  Hematological and Lymphatic: bleeding, bruising, jaundice, swollen lymph nodes  Endocrine: hot flashes, unexpected weight changes  Respiratory: cough, hemoptysis, orthopnea or shortness of breath/ROSE  Cardiovascular: chest pain, edema, palpitations or PND  Gastrointestinal: abdominal pain, blood in stools, change in bowel habits, constipation, diarrhea or nausea/vomiting  Genito-Urinary: change in urinary stream, incontinence, frequency/urgency  Musculoskeletal: joint pain, stiffness, swelling, muscle pain  Neurological: dizziness, headaches  Dermatological: lumps and rash    Past History:    Past Medical History:   Diagnosis Date    Asthma     Asthma     Breast cancer (H) 2014    left    Breast cancer (H) 2017    right    Breast cancer (H) 2014    left     "Breast cancer (H) 2017    right    Bronchitis, chronic (H)     Bronchitis, chronic (H)     Colon polyp 2009    one precancerous polyp    Colon polyp 2009    one precancerous polyp    Cytoxan-induced pulmonary interstitial disease (H)     Cytoxan-induced pulmonary interstitial disease (H)     Disease of thyroid gland     Disease of thyroid gland     GERD (gastroesophageal reflux disease)     GERD (gastroesophageal reflux disease)     Hx antineoplastic chemotherapy 2014    Hx antineoplastic chemotherapy 2014    Hx of radiation therapy 2014    Hx of radiation therapy 2014    Peripheral neuropathy     hands and feet    Peripheral neuropathy     hands and feet    Pneumonia     Pneumonia     Sinusitis     Sinusitis     Uterine polyp     Uterine polyp      Physical Exam:    BP (!) 145/85 (BP Location: Left arm, Patient Position: Sitting, Cuff Size: Adult Regular)   Pulse 78   Temp 98.6  F (37  C) (Tympanic)   Resp 18   Ht 1.549 m (5' 1\")   Wt 76.6 kg (168 lb 14.4 oz)   LMP  (LMP Unknown)   SpO2 98%   BMI 31.91 kg/m      General: patient appears stated age of 66 year old. Nontoxic and in no distress.   HEENT: Head: atraumatic, normocephalic. Sclerae anicteric.  Chest:  Normal respiratory effort  Cardiac:  No edema.   Abdomen: abdomen is non-distended  Extremities: normal tone and muscle bulk.  Skin: no lesions or rash on visible skin. Warm and dry.   CNS: alert and oriented. Grossly non-focal.   Psychiatric: normal mood and affect.     Lab Results:    Recent Results (from the past 168 hour(s))   Comprehensive metabolic panel (BMP + Alb, Alk Phos, ALT, AST, Total. Bili, TP)   Result Value Ref Range    Sodium 139 135 - 145 mmol/L    Potassium 5.3 3.4 - 5.3 mmol/L    Carbon Dioxide (CO2) 27 22 - 29 mmol/L    Anion Gap 10 7 - 15 mmol/L    Urea Nitrogen 20.6 8.0 - 23.0 mg/dL    Creatinine 0.96 (H) 0.51 - 0.95 mg/dL    GFR Estimate 64 >60 mL/min/1.73m2    Calcium 9.4 8.8 - 10.2 mg/dL    Chloride 102 98 - 107 mmol/L    " Glucose 116 (H) 70 - 99 mg/dL    Alkaline Phosphatase 118 (H) 35 - 104 U/L    AST 27 0 - 45 U/L    ALT 11 0 - 50 U/L    Protein Total 7.6 6.4 - 8.3 g/dL    Albumin 4.0 3.5 - 5.2 g/dL    Bilirubin Total 0.3 <=1.2 mg/dL        Imaging:    No results found.      Signed by: Moise Dee MD

## 2023-09-27 NOTE — LETTER
"    9/27/2023         RE: Willa Hernandez  [address] [address]        Dear Colleague,    Thank you for referring your patient, Willa Hernandez, to the [facility]. Please see a copy of my visit note below.    Oncology Rooming Note    September 27, 2023 10:41 AM   Willa Hernandez is a 68 year old female who presents for:    Chief Complaint   Patient presents with     Oncology Clinic Visit     6 month follow up with labs related to Malignant neoplasm of upper-outer quadrant of right breast in female, estrogen receptor negative     Initial Vitals: BP (!) 145/85 (BP Location: Left arm, Patient Position: Sitting, Cuff Size: Adult Regular)   Pulse 78   Temp 98.6  F (37  C) (Tympanic)   Resp 18   Ht 1.549 m (5' 1\")   Wt 76.6 kg (168 lb 14.4 oz)   LMP  (LMP Unknown)   SpO2 98%   BMI 31.91 kg/m   Estimated body mass index is 31.91 kg/m  as calculated from the following:    Height as of this encounter: 1.549 m (5' 1\").    Weight as of this encounter: 76.6 kg (168 lb 14.4 oz). Body surface area is 1.82 meters squared.  No Pain (0) Comment: Data Unavailable   No LMP recorded (lmp unknown). Patient has had a hysterectomy.  Allergies reviewed: Yes  Medications reviewed: Yes    Medications: Medication refills not needed today.  Pharmacy name entered into [facility]: [facility] #[id number] - [place] [phone] [address]    Clinical concerns: 6 month follow up with labs related to Malignant neoplasm of upper-outer quadrant of right breast in female, estrogen receptor negative      JAIME VARGAS CMA              [facility] Hematology and Oncology Progress Note    Patient: Willa Hernandez  MRN: [id number]  Date of Service: Sep 27, 2023        Assessment and Plan:    Breast cancer    Primary site: Breast (Left)    Staging method: AJCC 7th Edition    Clinical: Stage IA (T1c, N0, cM0) - Signed by Moise Dee MD on 8/13/2014    Pathologic " free text: [id number]    Summary: Stage IA (T1c, N0, cM0)     1. Breast cancer: She has a history of triple negative breast cancer.  There is no clinical evidence of recurrence today.  CMP from today was reviewed and shows a sodium of 139, potassium 5.3, creatinine 0.96, and alkaline phosphatase stable at 118.  ALT was 11.  She is now over 6 years from her most recent diagnosis.  Likelihood of recurrence is low at this point.  We will rehab return to clinic in 12 months.    2.  Abdominal discomfort: She complains of lower abdominal discomfort and bloating.  Intermittent diarrhea.  Her abdominal exam is benign.  We are going to get an abdominal and pelvic ultrasound to make sure there is no evidence of ovarian neoplasm or other pathology.    ECOG Performance  0    Diagnosis:    1. Invasive ductal carcinoma of the left breast:  Triple negative. Stage [id number]. Diagnosed July 2014. She had a positive margin at initial surgery. BRCA-.     2.  Invasive ductal carcinoma of the right breast: Triple negative.  Grade 3.  Clinical stage T2N1M0. Diagnosed July 2017.  MRI shows 3 foci and probable intervening tumor between these 3 foci.  Total size of abnormality in the MRI was 4.2 cm.  One suspicious-appearing node in the axilla.     3.  Pneumonitis: CT on October 11 shows diffuse bilateral mixed airspace and interstitial infiltrates. Possibly due to chemotherapy.  Bronchoscopy was done on October 20. Cultures negative.  She seemed to respond to steroids.    Treatment:    She had a lumpectomy followed by 4 cycles of Taxotere and Cytoxan. This was completed in October 2014. She then had a reresection for the positive margin. She then had adjuvant radiation completed in February 2015.      Recurrence:  She was treated with neoadjuvant Adriamycin and Cytoxan for her second, most recent diagnosis. Chemotherapy started on August 16, 2017.  Cycle 4 given on September 27.  Weekly Taxol started October 11.  Taxol reduced 25% for  neuropathy.  Last was completed on January 17, 2018     Right-sided lumpectomy performed in February 12, 2018.  Pathology showed complete response.  No residual disease.  Adjuvant radiation completed on May 11, 2018    Interim History:    Willa returns today for a follow-up visit.  Overall she is been doing okay.  Denies any new areas of pain, shortness of breath, or headaches.  She has constipation occasionally.  Some inconsistent bowel habits.  No nausea.  She also complains of some lower abdominal discomfort.  Off-and-on.  Bloating.    Review of Systems:    As above in the history.     Review of Systems otherwise Negative for:  General: chills, fever or night sweats  Psychological: anxiety or depression  Ophthalmic: blurry vision, double vision or loss of vision, vision change  ENT: epistaxis, oral lesions, hearing changes  Hematological and Lymphatic: bleeding, bruising, jaundice, swollen lymph nodes  Endocrine: hot flashes, unexpected weight changes  Respiratory: cough, hemoptysis, orthopnea or shortness of breath/ROSE  Cardiovascular: chest pain, edema, palpitations or PND  Gastrointestinal: abdominal pain, blood in stools, change in bowel habits, constipation, diarrhea or nausea/vomiting  Genito-Urinary: change in urinary stream, incontinence, frequency/urgency  Musculoskeletal: joint pain, stiffness, swelling, muscle pain  Neurological: dizziness, headaches  Dermatological: lumps and rash    Past History:    Past Medical History:   Diagnosis Date     Asthma      Asthma      Breast cancer (H) 2014    left     Breast cancer (H) 2017    right     Breast cancer (H) 2014    left     Breast cancer (H) 2017    right     Bronchitis, chronic (H)      Bronchitis, chronic (H)      Colon polyp 2009    one precancerous polyp     Colon polyp 2009    one precancerous polyp     Cytoxan-induced pulmonary interstitial disease (H)      Cytoxan-induced pulmonary interstitial disease (H)      Disease of thyroid gland       "Disease of thyroid gland      GERD (gastroesophageal reflux disease)      GERD (gastroesophageal reflux disease)      Hx antineoplastic chemotherapy 2014     Hx antineoplastic chemotherapy 2014     Hx of radiation therapy 2014     Hx of radiation therapy 2014     Peripheral neuropathy     hands and feet     Peripheral neuropathy     hands and feet     Pneumonia      Pneumonia      Sinusitis      Sinusitis      Uterine polyp      Uterine polyp      Physical Exam:    BP (!) 145/85 (BP Location: Left arm, Patient Position: Sitting, Cuff Size: Adult Regular)   Pulse 78   Temp 98.6  F (37  C) (Tympanic)   Resp 18   Ht 1.549 m (5' 1\")   Wt 76.6 kg (168 lb 14.4 oz)   LMP  (LMP Unknown)   SpO2 98%   BMI 31.91 kg/m      General: patient appears stated age of 66 year old. Nontoxic and in no distress.   HEENT: Head: atraumatic, normocephalic. Sclerae anicteric.  Chest:  Normal respiratory effort  Cardiac:  No edema.   Abdomen: abdomen is non-distended  Extremities: normal tone and muscle bulk.  Skin: no lesions or rash on visible skin. Warm and dry.   CNS: alert and oriented. Grossly non-focal.   Psychiatric: normal mood and affect.     Lab Results:    Recent Results (from the past 168 hour(s))   Comprehensive metabolic panel (BMP + Alb, Alk Phos, ALT, AST, Total. Bili, TP)   Result Value Ref Range    Sodium 139 135 - 145 mmol/L    Potassium 5.3 3.4 - 5.3 mmol/L    Carbon Dioxide (CO2) 27 22 - 29 mmol/L    Anion Gap 10 7 - 15 mmol/L    Urea Nitrogen 20.6 8.0 - 23.0 mg/dL    Creatinine 0.96 (H) 0.51 - 0.95 mg/dL    GFR Estimate 64 >60 mL/min/1.73m2    Calcium 9.4 8.8 - 10.2 mg/dL    Chloride 102 98 - 107 mmol/L    Glucose 116 (H) 70 - 99 mg/dL    Alkaline Phosphatase 118 (H) 35 - 104 U/L    AST 27 0 - 45 U/L    ALT 11 0 - 50 U/L    Protein Total 7.6 6.4 - 8.3 g/dL    Albumin 4.0 3.5 - 5.2 g/dL    Bilirubin Total 0.3 <=1.2 mg/dL        Imaging:    No results found.      Signed by: Moise Dee MD      Again, thank you " for allowing me to participate in the care of your patient.        Sincerely,        Moise Dee MD

## 2023-09-27 NOTE — PROGRESS NOTES
"Oncology Rooming Note    September 27, 2023 10:41 AM   Willa Hernandez is a 68 year old female who presents for:    Chief Complaint   Patient presents with    Oncology Clinic Visit     6 month follow up with labs related to Malignant neoplasm of upper-outer quadrant of right breast in female, estrogen receptor negative     Initial Vitals: BP (!) 145/85 (BP Location: Left arm, Patient Position: Sitting, Cuff Size: Adult Regular)   Pulse 78   Temp 98.6  F (37  C) (Tympanic)   Resp 18   Ht 1.549 m (5' 1\")   Wt 76.6 kg (168 lb 14.4 oz)   LMP  (LMP Unknown)   SpO2 98%   BMI 31.91 kg/m   Estimated body mass index is 31.91 kg/m  as calculated from the following:    Height as of this encounter: 1.549 m (5' 1\").    Weight as of this encounter: 76.6 kg (168 lb 14.4 oz). Body surface area is 1.82 meters squared.  No Pain (0) Comment: Data Unavailable   No LMP recorded (lmp unknown). Patient has had a hysterectomy.  Allergies reviewed: Yes  Medications reviewed: Yes    Medications: Medication refills not needed today.  Pharmacy name entered into [employer]: [facility] #[id number] - [place] [id number] [address]    Clinical concerns: 6 month follow up with labs related to Malignant neoplasm of upper-outer quadrant of right breast in female, estrogen receptor negative      JAIME VARGAS CMA            "

## 2023-10-03 ENCOUNTER — HOSPITAL ENCOUNTER (OUTPATIENT)
Dept: ULTRASOUND IMAGING | Facility: HOSPITAL | Age: 68
Discharge: HOME | End: 2023-10-03
Attending: INTERNAL MEDICINE | Admitting: INTERNAL MEDICINE
Payer: COMMERCIAL

## 2023-10-03 DIAGNOSIS — R14.0 ABDOMINAL BLOATING: ICD-10-CM

## 2023-10-03 PROCEDURE — 76700 US EXAM ABDOM COMPLETE: CPT

## 2023-11-14 ENCOUNTER — PATIENT OUTREACH (OUTPATIENT)
Dept: ONCOLOGY | Facility: HOSPITAL | Age: 68
End: 2023-11-14
Payer: COMMERCIAL

## 2023-11-14 NOTE — PROGRESS NOTES
[facility]: Cancer Care                                                                                          Per Dr. Dee ultrasound about 6 weeks ago which was generally unremarkable.  This is for abdominal and pelvic pain.  Could you please give her a call sometime this week and see if she is still having discomfort.  If she is then we will have to get a CT.   Currently things are ok per patient.  She will let us know if the discomfort comes back.    Signature:  Roger Wellington RN

## 2023-12-20 ENCOUNTER — HOSPITAL ENCOUNTER (OUTPATIENT)
Dept: BONE DENSITY | Facility: HOSPITAL | Age: 68
Discharge: HOME | End: 2023-12-20
Attending: FAMILY MEDICINE | Admitting: FAMILY MEDICINE
Payer: COMMERCIAL

## 2023-12-20 DIAGNOSIS — Z78.0 POSTMENOPAUSAL STATUS: ICD-10-CM

## 2023-12-20 PROCEDURE — 77080 DXA BONE DENSITY AXIAL: CPT

## 2023-12-24 ENCOUNTER — HEALTH MAINTENANCE LETTER (OUTPATIENT)
Age: 68
End: 2023-12-24

## 2024-02-14 ENCOUNTER — ANCILLARY PROCEDURE (OUTPATIENT)
Dept: MAMMOGRAPHY | Facility: CLINIC | Age: 69
End: 2024-02-14
Attending: PHYSICIAN ASSISTANT
Payer: COMMERCIAL

## 2024-02-14 DIAGNOSIS — N64.4 BREAST PAIN, LEFT: ICD-10-CM

## 2024-02-14 DIAGNOSIS — R92.8 OTHER ABNORMAL AND INCONCLUSIVE FINDINGS ON DIAGNOSTIC IMAGING OF BREAST: ICD-10-CM

## 2024-02-14 PROCEDURE — 76642 ULTRASOUND BREAST LIMITED: CPT | Mod: 50

## 2024-02-14 PROCEDURE — 77062 BREAST TOMOSYNTHESIS BI: CPT

## 2024-02-14 PROCEDURE — 88305 TISSUE EXAM BY PATHOLOGIST: CPT | Mod: TC

## 2024-02-14 PROCEDURE — 999N000065 MA POST PROCEDURE RIGHT

## 2024-02-14 PROCEDURE — 250N000009 HC RX 250: Performed by: RADIOLOGY

## 2024-02-14 PROCEDURE — 272N000431 US BREAST BIOPSY CORE NEEDLE RIGHT

## 2024-02-14 RX ADMIN — LIDOCAINE HYDROCHLORIDE 10 ML: 10 INJECTION, SOLUTION INFILTRATION; PERINEURAL at 11:35

## 2024-02-14 NOTE — PROGRESS NOTES
"Post Biopsy Assessment:  Verified patient by name and .  Right breast biopsy site dry and intact with steri strip.  Right breast is soft, no swelling, no pain.  Covered the biopsy site with 4X4 and paper tape.  Reviewed patient education \"After your Breast Biopsy\" with patient, written and verbal.  Patient states understanding of all including when to call for assistance and when to expect biopsy results.  Walked patient to exit, support provided, invited calls.    "

## 2024-02-15 LAB
PATH REPORT.COMMENTS IMP SPEC: NORMAL
PATH REPORT.FINAL DX SPEC: NORMAL
PATH REPORT.GROSS SPEC: NORMAL
PATH REPORT.MICROSCOPIC SPEC OTHER STN: NORMAL
PATH REPORT.RELEVANT HX SPEC: NORMAL
PHOTO IMAGE: NORMAL

## 2024-02-15 PROCEDURE — 88305 TISSUE EXAM BY PATHOLOGIST: CPT | Mod: 26 | Performed by: PATHOLOGY

## 2024-08-14 ENCOUNTER — LAB REQUISITION (OUTPATIENT)
Dept: LAB | Facility: CLINIC | Age: 69
End: 2024-08-14

## 2024-08-14 DIAGNOSIS — N61.0 MASTITIS WITHOUT ABSCESS: ICD-10-CM

## 2024-08-14 LAB — CRP SERPL-MCNC: 11.1 MG/L

## 2024-08-14 PROCEDURE — 86140 C-REACTIVE PROTEIN: CPT | Performed by: FAMILY MEDICINE

## 2024-08-30 DIAGNOSIS — Z17.1 MALIGNANT NEOPLASM OF UPPER-OUTER QUADRANT OF RIGHT BREAST IN FEMALE, ESTROGEN RECEPTOR NEGATIVE (H): Primary | ICD-10-CM

## 2024-08-30 DIAGNOSIS — C50.411 MALIGNANT NEOPLASM OF UPPER-OUTER QUADRANT OF RIGHT BREAST IN FEMALE, ESTROGEN RECEPTOR NEGATIVE (H): Primary | ICD-10-CM

## 2024-09-03 ENCOUNTER — TELEPHONE (OUTPATIENT)
Dept: ONCOLOGY | Facility: HOSPITAL | Age: 69
End: 2024-09-03
Payer: COMMERCIAL

## 2024-09-03 NOTE — TELEPHONE ENCOUNTER
I received a phone call this morning from Willa.  She is calling because she is scheduled to see Dr. Dee on Friday 9/6.  She is wondering if Dr. Dee will want her to have a diagnostic mammogram prior to the appointment as she is having new symptoms in her left breast (breast hardness and bruising).  I reviewed this with Dr. Dee today and he would like to see the patient first and then decide whether or not imaging is warranted.  I called Willa back with this information.  She verbalized understanding and agrees with this plan.  She has no other questions at this time.    Aide Melendez RN on 9/3/2024 at 9:35 AM

## 2024-09-05 ENCOUNTER — TELEPHONE (OUTPATIENT)
Dept: ONCOLOGY | Facility: HOSPITAL | Age: 69
End: 2024-09-05
Payer: COMMERCIAL

## 2024-09-05 NOTE — TELEPHONE ENCOUNTER
I received a phone call today from Willa.  She is calling because she was exposed to COVID-19 over Labor Day weekend.  She has an appointment in our clinic tomorrow and she is wondering if she is still okay to come as scheduled.  She does not have any symptoms at this time.  I let her know she is okay to come as long as she does not test positive and has no symptoms.  She verbalized understanding and agrees with this plan.  She has no other questions at this time.    Aide Melendez RN on 9/5/2024 at 11:32 AM

## 2024-09-06 ENCOUNTER — LAB (OUTPATIENT)
Dept: INFUSION THERAPY | Facility: HOSPITAL | Age: 69
End: 2024-09-06
Payer: COMMERCIAL

## 2024-09-06 ENCOUNTER — ONCOLOGY VISIT (OUTPATIENT)
Dept: ONCOLOGY | Facility: HOSPITAL | Age: 69
End: 2024-09-06
Payer: COMMERCIAL

## 2024-09-06 VITALS
DIASTOLIC BLOOD PRESSURE: 76 MMHG | BODY MASS INDEX: 33.23 KG/M2 | HEART RATE: 86 BPM | TEMPERATURE: 99 F | OXYGEN SATURATION: 99 % | HEIGHT: 61 IN | RESPIRATION RATE: 18 BRPM | SYSTOLIC BLOOD PRESSURE: 153 MMHG | WEIGHT: 176 LBS

## 2024-09-06 DIAGNOSIS — C50.812 MALIGNANT NEOPLASM OF OVERLAPPING SITES OF LEFT BREAST IN FEMALE, ESTROGEN RECEPTOR NEGATIVE (H): Primary | ICD-10-CM

## 2024-09-06 DIAGNOSIS — Z17.1 MALIGNANT NEOPLASM OF UPPER-OUTER QUADRANT OF RIGHT BREAST IN FEMALE, ESTROGEN RECEPTOR NEGATIVE (H): ICD-10-CM

## 2024-09-06 DIAGNOSIS — C50.411 MALIGNANT NEOPLASM OF UPPER-OUTER QUADRANT OF RIGHT BREAST IN FEMALE, ESTROGEN RECEPTOR NEGATIVE (H): ICD-10-CM

## 2024-09-06 DIAGNOSIS — Z17.1 MALIGNANT NEOPLASM OF OVERLAPPING SITES OF LEFT BREAST IN FEMALE, ESTROGEN RECEPTOR NEGATIVE (H): Primary | ICD-10-CM

## 2024-09-06 LAB
ALBUMIN SERPL BCG-MCNC: 3.9 G/DL (ref 3.5–5.2)
ALP SERPL-CCNC: 120 U/L (ref 40–150)
ALT SERPL W P-5'-P-CCNC: 12 U/L (ref 0–50)
ANION GAP SERPL CALCULATED.3IONS-SCNC: 7 MMOL/L (ref 7–15)
AST SERPL W P-5'-P-CCNC: 28 U/L (ref 0–45)
BASOPHILS # BLD AUTO: 0 10E3/UL (ref 0–0.2)
BASOPHILS NFR BLD AUTO: 1 %
BILIRUB SERPL-MCNC: 0.2 MG/DL
BUN SERPL-MCNC: 14.1 MG/DL (ref 8–23)
CALCIUM SERPL-MCNC: 8.8 MG/DL (ref 8.8–10.4)
CHLORIDE SERPL-SCNC: 103 MMOL/L (ref 98–107)
CREAT SERPL-MCNC: 0.92 MG/DL (ref 0.51–0.95)
EGFRCR SERPLBLD CKD-EPI 2021: 67 ML/MIN/1.73M2
EOSINOPHIL # BLD AUTO: 0 10E3/UL (ref 0–0.7)
EOSINOPHIL NFR BLD AUTO: 0 %
ERYTHROCYTE [DISTWIDTH] IN BLOOD BY AUTOMATED COUNT: 15.1 % (ref 10–15)
GLUCOSE SERPL-MCNC: 93 MG/DL (ref 70–99)
HCO3 SERPL-SCNC: 30 MMOL/L (ref 22–29)
HCT VFR BLD AUTO: 44.2 % (ref 35–47)
HGB BLD-MCNC: 13.7 G/DL (ref 11.7–15.7)
IMM GRANULOCYTES # BLD: 0 10E3/UL
IMM GRANULOCYTES NFR BLD: 1 %
LYMPHOCYTES # BLD AUTO: 1 10E3/UL (ref 0.8–5.3)
LYMPHOCYTES NFR BLD AUTO: 17 %
MCH RBC QN AUTO: 27.5 PG (ref 26.5–33)
MCHC RBC AUTO-ENTMCNC: 31 G/DL (ref 31.5–36.5)
MCV RBC AUTO: 89 FL (ref 78–100)
MONOCYTES # BLD AUTO: 0.9 10E3/UL (ref 0–1.3)
MONOCYTES NFR BLD AUTO: 15 %
NEUTROPHILS # BLD AUTO: 4.2 10E3/UL (ref 1.6–8.3)
NEUTROPHILS NFR BLD AUTO: 68 %
NRBC # BLD AUTO: 0 10E3/UL
NRBC BLD AUTO-RTO: 0 /100
PLATELET # BLD AUTO: 218 10E3/UL (ref 150–450)
POTASSIUM SERPL-SCNC: 4.9 MMOL/L (ref 3.4–5.3)
PROT SERPL-MCNC: 7.3 G/DL (ref 6.4–8.3)
RBC # BLD AUTO: 4.99 10E6/UL (ref 3.8–5.2)
SODIUM SERPL-SCNC: 140 MMOL/L (ref 135–145)
WBC # BLD AUTO: 6.2 10E3/UL (ref 4–11)

## 2024-09-06 PROCEDURE — G2211 COMPLEX E/M VISIT ADD ON: HCPCS | Performed by: INTERNAL MEDICINE

## 2024-09-06 PROCEDURE — 99214 OFFICE O/P EST MOD 30 MIN: CPT | Performed by: INTERNAL MEDICINE

## 2024-09-06 PROCEDURE — 85025 COMPLETE CBC W/AUTO DIFF WBC: CPT

## 2024-09-06 PROCEDURE — 80053 COMPREHEN METABOLIC PANEL: CPT

## 2024-09-06 PROCEDURE — 36415 COLL VENOUS BLD VENIPUNCTURE: CPT

## 2024-09-06 PROCEDURE — G0463 HOSPITAL OUTPT CLINIC VISIT: HCPCS | Performed by: INTERNAL MEDICINE

## 2024-09-06 ASSESSMENT — PAIN SCALES - GENERAL: PAINLEVEL: NO PAIN (1)

## 2024-09-06 NOTE — PROGRESS NOTES
[facility] Hematology and Oncology Progress Note    Patient: Willa Hernandez  MRN: [id number]  Date of Service: Sep 6, 2024        Assessment and Plan:    Breast cancer    Primary site: Breast (Left)    Staging method: AJCC 7th Edition    Clinical: Stage IA (T1c, N0, cM0) - Signed by Moise Dee MD on 8/13/2014    Pathologic free text: [id number]    Summary: Stage IA (T1c, N0, cM0)     1. Breast cancer: She has a history of triple negative breast cancer.  Bilateral but diagnosed at different time points.  She comes in today with some concerns regarding her left breast.  There is been some evolving skin thickening and hyperpigmentation over the past 9 months or so.  This is confirmed on examination.  There is no erythema or skin breakdown.  No nipple discharge.  She had imaging in February of this year we are going to repeat a diagnostic mammogram and ultrasound and compare the results to those from February.  I also sending her back to surgery for a punch biopsy of the skin just to confirm there is no malignancy there.  Will follow-up when we have these results back to make a plan going forward.  an order was also placed to prosthetics for compression bra.    Data review:  CMP shows a sodium of 148, potassium 4.9, BUN 14.1 and creatinine 0.92  CBC shows a white count of 6.2, hemoglobin 13.7, platelets 218,000    ECOG Performance  0    Diagnosis:    1. Invasive ductal carcinoma of the left breast:  Triple negative. Stage O3pG2Gc. Diagnosed July 2014. She had a positive margin at initial surgery. BRCA-.     2.  Invasive ductal carcinoma of the right breast: Triple negative.  Grade 3.  Clinical stage T2N1M0. Diagnosed July 2017. MRI shows 3 foci and probable intervening tumor between these 3 foci.  Total size of abnormality in the MRI was 4.2 cm. One suspicious-appearing node in the axilla.     3.  Pneumonitis: CT on October 11, 2017 shows diffuse bilateral mixed airspace and interstitial  infiltrates. Possibly due to chemotherapy (taxol).  Bronchoscopy was done on October 20. Cultures negative.  She seemed to respond to steroids.    Treatment:    She had a lumpectomy followed by 4 cycles of Taxotere and Cytoxan. This was completed in October 2014. She then had a reresection for the positive margin. She then had adjuvant radiation completed in February 2015.      Recurrence:  She was treated with neoadjuvant Adriamycin and Cytoxan for her second, most recent diagnosis. Chemotherapy started on August 16, 2017.  Cycle 4 given on September 27.  Weekly Taxol started October 11.  Taxol reduced 25% for neuropathy. Last was completed on January 17, 2018     Right-sided lumpectomy performed in February 12, 2018.  Pathology showed complete response.  No residual disease.  Adjuvant radiation completed on May 11, 2018    Interim History:    Willa returns today for a follow-up visit.  She notes some changes in the left breast.  She states that since February or so her left breast has felt warmer to touch.  Over the past couple months there has been some change in the pigmentation of some of the skin on the left breast and some increased skin thickening on the left.  No skin breakdown or nipple discharge.  No headaches or other bony pain.  No cough or shortness of breath.    Review of Systems:    As above in the history.     Review of Systems otherwise Negative for:  General: chills, fever or night sweats  Psychological: anxiety or depression  Ophthalmic: blurry vision, double vision or loss of vision, vision change  ENT: epistaxis, oral lesions, hearing changes  Hematological and Lymphatic: bleeding, bruising, jaundice, swollen lymph nodes  Endocrine: hot flashes, unexpected weight changes  Respiratory: cough, hemoptysis, orthopnea or shortness of breath/ROSE  Cardiovascular: chest pain, edema, palpitations or PND  Gastrointestinal: abdominal pain, blood in stools, change in bowel habits, constipation, diarrhea  "or nausea/vomiting  Genito-Urinary: change in urinary stream, incontinence, frequency/urgency  Musculoskeletal: joint pain, stiffness, swelling, muscle pain  Neurological: dizziness, headaches  Dermatological: rash    Past History:    Past Medical History:   Diagnosis Date    Asthma     Asthma     Breast cancer (H) 2014    left    Breast cancer (H) 2017    right    Breast cancer (H) 2014    left    Breast cancer (H) 2017    right    Bronchitis, chronic (H)     Bronchitis, chronic (H)     Colon polyp 2009    one precancerous polyp    Colon polyp 2009    one precancerous polyp    Cytoxan-induced pulmonary interstitial disease (H)     Cytoxan-induced pulmonary interstitial disease (H)     Disease of thyroid gland     Disease of thyroid gland     GERD (gastroesophageal reflux disease)     GERD (gastroesophageal reflux disease)     Hx antineoplastic chemotherapy 2014    Hx antineoplastic chemotherapy 2014    Hx of radiation therapy 2014    Hx of radiation therapy 2014    Peripheral neuropathy     hands and feet    Peripheral neuropathy     hands and feet    Pneumonia     Pneumonia     Sinusitis     Sinusitis     Uterine polyp     Uterine polyp      Physical Exam:    BP (!) 153/76 (BP Location: Right arm, Patient Position: Sitting, Cuff Size: Adult Regular)   Pulse 86   Temp 99  F (37.2  C) (Tympanic)   Resp 18   Ht 1.556 m (5' 1.25\")   Wt 79.8 kg (176 lb)   LMP  (LMP Unknown)   SpO2 99%   BMI 32.98 kg/m      General: patient appears stated age of 66 year old. Nontoxic and in no distress.   HEENT: Head: atraumatic, normocephalic. Sclerae anicteric.  Chest:  Normal respiratory effort  Cardiac:  No edema.   Abdomen: abdomen is non-distended  Extremities: normal tone and muscle bulk.  Skin: no lesions or rash on visible skin. Warm and dry.   CNS: alert and oriented. Grossly non-focal.   Psychiatric: normal mood and affect.   Breast: Right breast is normal to inspection and palpation.  Left breast has some areas of " hyperpigmentation surrounding the nipple.  There is also some asymmetric skin thickening and hyperpigmentation  Around the nipple.    Lab Results:    Recent Results (from the past 168 hour(s))   Comprehensive metabolic panel (BMP + Alb, Alk Phos, ALT, AST, Total. Bili, TP)   Result Value Ref Range    Sodium 140 135 - 145 mmol/L    Potassium 4.9 3.4 - 5.3 mmol/L    Carbon Dioxide (CO2) 30 (H) 22 - 29 mmol/L    Anion Gap 7 7 - 15 mmol/L    Urea Nitrogen 14.1 8.0 - 23.0 mg/dL    Creatinine 0.92 0.51 - 0.95 mg/dL    GFR Estimate 67 >60 mL/min/1.73m2    Calcium 8.8 8.8 - 10.4 mg/dL    Chloride 103 98 - 107 mmol/L    Glucose 93 70 - 99 mg/dL    Alkaline Phosphatase 120 40 - 150 U/L    AST 28 0 - 45 U/L    ALT 12 0 - 50 U/L    Protein Total 7.3 6.4 - 8.3 g/dL    Albumin 3.9 3.5 - 5.2 g/dL    Bilirubin Total 0.2 <=1.2 mg/dL   CBC with platelets and differential   Result Value Ref Range    WBC Count 6.2 4.0 - 11.0 10e3/uL    RBC Count 4.99 3.80 - 5.20 10e6/uL    Hemoglobin 13.7 11.7 - 15.7 g/dL    Hematocrit 44.2 35.0 - 47.0 %    MCV 89 78 - 100 fL    MCH 27.5 26.5 - 33.0 pg    MCHC 31.0 (L) 31.5 - 36.5 g/dL    RDW 15.1 (H) 10.0 - 15.0 %    Platelet Count 218 150 - 450 10e3/uL    % Neutrophils 68 %    % Lymphocytes 17 %    % Monocytes 15 %    % Eosinophils 0 %    % Basophils 1 %    % Immature Granulocytes 1 %    NRBCs per 100 WBC 0 <1 /100    Absolute Neutrophils 4.2 1.6 - 8.3 10e3/uL    Absolute Lymphocytes 1.0 0.8 - 5.3 10e3/uL    Absolute Monocytes 0.9 0.0 - 1.3 10e3/uL    Absolute Eosinophils 0.0 0.0 - 0.7 10e3/uL    Absolute Basophils 0.0 0.0 - 0.2 10e3/uL    Absolute Immature Granulocytes 0.0 <=0.4 10e3/uL    Absolute NRBCs 0.0 10e3/uL      Imaging:    No results found.      Signed by: Moise Dee MD

## 2024-09-06 NOTE — LETTER
"9/6/2024      Willa Hernandez  [address] [facility] [facility] [facility] [address]      Dear Colleague,    Thank you for referring your patient, Willa Hernandez, to the [facility]. Please see a copy of my visit note below.    Oncology Rooming Note    September 6, 2024 8:45 AM   Willa Hernandez is a 69 year old female who presents for:    Chief Complaint   Patient presents with     Oncology Clinic Visit     Return visit for new breast issues     Initial Vitals: BP (!) 153/76 (BP Location: Right arm, Patient Position: Sitting, Cuff Size: Adult Regular)   Pulse 86   Temp 99  F (37.2  C) (Tympanic)   Resp 18   Ht 1.556 m (5' 1.25\")   Wt 79.8 kg (176 lb)   LMP  (LMP Unknown)   SpO2 99%   BMI 32.98 kg/m   Estimated body mass index is 32.98 kg/m  as calculated from the following:    Height as of this encounter: 1.556 m (5' 1.25\").    Weight as of this encounter: 79.8 kg (176 lb). Body surface area is 1.86 meters squared.  No Pain (1) Comment: Data Unavailable   No LMP recorded (lmp unknown). Patient has had a hysterectomy.  Allergies reviewed: Yes  Medications reviewed: Yes    Medications: Medication refills not needed today.  Pharmacy name entered into [employer]: [facility][id number] - [place] [id number]     Frailty Screening:   Is the patient here for a new oncology consult visit in cancer care? 2. No      Clinical concerns:       JAIME VARGAS CMA              [facility] Hematology and Oncology Progress Note    Patient: Willa Hernandez  MRN: [id number]  Date of Service: Sep 6, 2024        Assessment and Plan:    Breast cancer    Primary site: Breast (Left)    Staging method: AJCC 7th Edition    Clinical: Stage IA (T1c, N0, cM0) - Signed by Moise Dee MD on 8/13/2014    Pathologic free text: [id number]    Summary: Stage IA (T1c, N0, cM0)     1. Breast cancer: She has a history of triple negative breast cancer.  Bilateral but diagnosed at " different time points.  She comes in today with some concerns regarding her left breast.  There is been some evolving skin thickening and hyperpigmentation over the past 9 months or so.  This is confirmed on examination.  There is no erythema or skin breakdown.  No nipple discharge.  She had imaging in February of this year we are going to repeat a diagnostic mammogram and ultrasound and compare the results to those from February.  I also sending her back to surgery for a punch biopsy of the skin just to confirm there is no malignancy there.  Will follow-up when we have these results back to make a plan going forward.  an order was also placed to prosthetics for compression bra.    Data review:  CMP shows a sodium of 148, potassium 4.9, BUN 14.1 and creatinine 0.92  CBC shows a white count of 6.2, hemoglobin 13.7, platelets 218,000    ECOG Performance  0    Diagnosis:    1. Invasive ductal carcinoma of the left breast:  Triple negative. Stage [id number]. Diagnosed July 2014. She had a positive margin at initial surgery. BRCA-.     2.  Invasive ductal carcinoma of the right breast: Triple negative.  Grade 3.  Clinical stage T2N1M0. Diagnosed July 2017. MRI shows 3 foci and probable intervening tumor between these 3 foci.  Total size of abnormality in the MRI was 4.2 cm. One suspicious-appearing node in the axilla.     3.  Pneumonitis: CT on October 11, 2017 shows diffuse bilateral mixed airspace and interstitial infiltrates. Possibly due to chemotherapy (taxol).  Bronchoscopy was done on October 20. Cultures negative.  She seemed to respond to steroids.    Treatment:    She had a lumpectomy followed by 4 cycles of Taxotere and Cytoxan. This was completed in October 2014. She then had a reresection for the positive margin. She then had adjuvant radiation completed in February 2015.      Recurrence:  She was treated with neoadjuvant Adriamycin and Cytoxan for her second, most recent diagnosis. Chemotherapy started on  August 16, 2017.  Cycle 4 given on September 27.  Weekly Taxol started October 11.  Taxol reduced 25% for neuropathy. Last was completed on January 17, 2018     Right-sided lumpectomy performed in February 12, 2018.  Pathology showed complete response.  No residual disease.  Adjuvant radiation completed on May 11, 2018    Interim History:    Willa returns today for a follow-up visit.  She notes some changes in the left breast.  She states that since February or so her left breast has felt warmer to touch.  Over the past couple months there has been some change in the pigmentation of some of the skin on the left breast and some increased skin thickening on the left.  No skin breakdown or nipple discharge.  No headaches or other bony pain.  No cough or shortness of breath.    Review of Systems:    As above in the history.     Review of Systems otherwise Negative for:  General: chills, fever or night sweats  Psychological: anxiety or depression  Ophthalmic: blurry vision, double vision or loss of vision, vision change  ENT: epistaxis, oral lesions, hearing changes  Hematological and Lymphatic: bleeding, bruising, jaundice, swollen lymph nodes  Endocrine: hot flashes, unexpected weight changes  Respiratory: cough, hemoptysis, orthopnea or shortness of breath/ROSE  Cardiovascular: chest pain, edema, palpitations or PND  Gastrointestinal: abdominal pain, blood in stools, change in bowel habits, constipation, diarrhea or nausea/vomiting  Genito-Urinary: change in urinary stream, incontinence, frequency/urgency  Musculoskeletal: joint pain, stiffness, swelling, muscle pain  Neurological: dizziness, headaches  Dermatological: rash    Past History:    Past Medical History:   Diagnosis Date     Asthma      Asthma      Breast cancer (H) 2014    left     Breast cancer (H) 2017    right     Breast cancer (H) 2014    left     Breast cancer (H) 2017    right     Bronchitis, chronic (H)      Bronchitis, chronic (H)      Colon polyp  "2009    one precancerous polyp     Colon polyp 2009    one precancerous polyp     Cytoxan-induced pulmonary interstitial disease (H)      Cytoxan-induced pulmonary interstitial disease (H)      Disease of thyroid gland      Disease of thyroid gland      GERD (gastroesophageal reflux disease)      GERD (gastroesophageal reflux disease)      Hx antineoplastic chemotherapy 2014     Hx antineoplastic chemotherapy 2014     Hx of radiation therapy 2014     Hx of radiation therapy 2014     Peripheral neuropathy     hands and feet     Peripheral neuropathy     hands and feet     Pneumonia      Pneumonia      Sinusitis      Sinusitis      Uterine polyp      Uterine polyp      Physical Exam:    BP (!) 153/76 (BP Location: Right arm, Patient Position: Sitting, Cuff Size: Adult Regular)   Pulse 86   Temp 99  F (37.2  C) (Tympanic)   Resp 18   Ht 1.556 m (5' 1.25\")   Wt 79.8 kg (176 lb)   LMP  (LMP Unknown)   SpO2 99%   BMI 32.98 kg/m      General: patient appears stated age of 66 year old. Nontoxic and in no distress.   HEENT: Head: atraumatic, normocephalic. Sclerae anicteric.  Chest:  Normal respiratory effort  Cardiac:  No edema.   Abdomen: abdomen is non-distended  Extremities: normal tone and muscle bulk.  Skin: no lesions or rash on visible skin. Warm and dry.   CNS: alert and oriented. Grossly non-focal.   Psychiatric: normal mood and affect.   Breast: Right breast is normal to inspection and palpation.  Left breast has some areas of hyperpigmentation surrounding the nipple.  There is also some asymmetric skin thickening and hyperpigmentation  Around the nipple.    Lab Results:    Recent Results (from the past 168 hour(s))   Comprehensive metabolic panel (BMP + Alb, Alk Phos, ALT, AST, Total. Bili, TP)   Result Value Ref Range    Sodium 140 135 - 145 mmol/L    Potassium 4.9 3.4 - 5.3 mmol/L    Carbon Dioxide (CO2) 30 (H) 22 - 29 mmol/L    Anion Gap 7 7 - 15 mmol/L    Urea Nitrogen 14.1 8.0 - 23.0 mg/dL    " Creatinine 0.92 0.51 - 0.95 mg/dL    GFR Estimate 67 >60 mL/min/1.73m2    Calcium 8.8 8.8 - 10.4 mg/dL    Chloride 103 98 - 107 mmol/L    Glucose 93 70 - 99 mg/dL    Alkaline Phosphatase 120 40 - 150 U/L    AST 28 0 - 45 U/L    ALT 12 0 - 50 U/L    Protein Total 7.3 6.4 - 8.3 g/dL    Albumin 3.9 3.5 - 5.2 g/dL    Bilirubin Total 0.2 <=1.2 mg/dL   CBC with platelets and differential   Result Value Ref Range    WBC Count 6.2 4.0 - 11.0 10e3/uL    RBC Count 4.99 3.80 - 5.20 10e6/uL    Hemoglobin 13.7 11.7 - 15.7 g/dL    Hematocrit 44.2 35.0 - 47.0 %    MCV 89 78 - 100 fL    MCH 27.5 26.5 - 33.0 pg    MCHC 31.0 (L) 31.5 - 36.5 g/dL    RDW 15.1 (H) 10.0 - 15.0 %    Platelet Count 218 150 - 450 10e3/uL    % Neutrophils 68 %    % Lymphocytes 17 %    % Monocytes 15 %    % Eosinophils 0 %    % Basophils 1 %    % Immature Granulocytes 1 %    NRBCs per 100 WBC 0 <1 /100    Absolute Neutrophils 4.2 1.6 - 8.3 10e3/uL    Absolute Lymphocytes 1.0 0.8 - 5.3 10e3/uL    Absolute Monocytes 0.9 0.0 - 1.3 10e3/uL    Absolute Eosinophils 0.0 0.0 - 0.7 10e3/uL    Absolute Basophils 0.0 0.0 - 0.2 10e3/uL    Absolute Immature Granulocytes 0.0 <=0.4 10e3/uL    Absolute NRBCs 0.0 10e3/uL      Imaging:    No results found.      Signed by: Moise Dee MD      Again, thank you for allowing me to participate in the care of your patient.        Sincerely,        Moise Dee MD

## 2024-09-06 NOTE — PROGRESS NOTES
"Oncology Rooming Note    September 6, 2024 8:45 AM   Willa Hernandez is a 69 year old female who presents for:    Chief Complaint   Patient presents with    Oncology Clinic Visit     Return visit for new breast issues     Initial Vitals: BP (!) 153/76 (BP Location: Right arm, Patient Position: Sitting, Cuff Size: Adult Regular)   Pulse 86   Temp 99  F (37.2  C) (Tympanic)   Resp 18   Ht 1.556 m (5' 1.25\")   Wt 79.8 kg (176 lb)   LMP  (LMP Unknown)   SpO2 99%   BMI 32.98 kg/m   Estimated body mass index is 32.98 kg/m  as calculated from the following:    Height as of this encounter: 1.556 m (5' 1.25\").    Weight as of this encounter: 79.8 kg (176 lb). Body surface area is 1.86 meters squared.  No Pain (1) Comment: Data Unavailable   No LMP recorded (lmp unknown). Patient has had a hysterectomy.  Allergies reviewed: Yes  Medications reviewed: Yes    Medications: Medication refills not needed today.  Pharmacy name entered into [facility]: [facility] #[id number] - [address]     Frailty Screening:   Is the patient here for a new oncology consult visit in cancer care? 2. No      Clinical concerns:       JAIME VARGAS CMA            "

## 2024-09-23 ENCOUNTER — ANCILLARY PROCEDURE (OUTPATIENT)
Dept: MAMMOGRAPHY | Facility: CLINIC | Age: 69
End: 2024-09-23
Attending: INTERNAL MEDICINE
Payer: COMMERCIAL

## 2024-09-23 DIAGNOSIS — C50.411 MALIGNANT NEOPLASM OF UPPER-OUTER QUADRANT OF RIGHT BREAST IN FEMALE, ESTROGEN RECEPTOR NEGATIVE (H): ICD-10-CM

## 2024-09-23 DIAGNOSIS — Z17.1 MALIGNANT NEOPLASM OF UPPER-OUTER QUADRANT OF RIGHT BREAST IN FEMALE, ESTROGEN RECEPTOR NEGATIVE (H): ICD-10-CM

## 2024-09-23 PROCEDURE — 77061 BREAST TOMOSYNTHESIS UNI: CPT | Mod: LT

## 2024-09-23 PROCEDURE — 76642 ULTRASOUND BREAST LIMITED: CPT | Mod: LT

## 2024-09-25 ENCOUNTER — OFFICE VISIT (OUTPATIENT)
Dept: SURGERY | Facility: CLINIC | Age: 69
End: 2024-09-25
Attending: INTERNAL MEDICINE
Payer: COMMERCIAL

## 2024-09-25 VITALS — WEIGHT: 176 LBS | BODY MASS INDEX: 33.23 KG/M2 | HEIGHT: 61 IN

## 2024-09-25 DIAGNOSIS — C50.411 MALIGNANT NEOPLASM OF UPPER-OUTER QUADRANT OF RIGHT BREAST IN FEMALE, ESTROGEN RECEPTOR NEGATIVE (H): ICD-10-CM

## 2024-09-25 DIAGNOSIS — Z17.1 MALIGNANT NEOPLASM OF UPPER-OUTER QUADRANT OF RIGHT BREAST IN FEMALE, ESTROGEN RECEPTOR NEGATIVE (H): ICD-10-CM

## 2024-09-25 DIAGNOSIS — I89.0 LYMPHEDEMA OF BREAST: Primary | ICD-10-CM

## 2024-09-25 PROCEDURE — G0463 HOSPITAL OUTPT CLINIC VISIT: HCPCS | Mod: 25 | Performed by: SURGERY

## 2024-09-25 PROCEDURE — 99204 OFFICE O/P NEW MOD 45 MIN: CPT | Mod: 25 | Performed by: SURGERY

## 2024-09-25 PROCEDURE — 11104 PUNCH BX SKIN SINGLE LESION: CPT | Performed by: SURGERY

## 2024-09-25 PROCEDURE — 88342 IMHCHEM/IMCYTCHM 1ST ANTB: CPT | Mod: TC | Performed by: SURGERY

## 2024-09-25 NOTE — NURSING NOTE
Willa presents to [facility] today for a surgical consult with Dr. Quinn  regarding left breast skin changes, hx of left breast cancer treated with surgery and radiation.  RN assessment and EMR update.  Patient met with az.  See dictation for details of visit. Assisted MD with punch biopsy.  Pressure held, dressing applied.  Told patient results may take up to a week to come back.  Dr. Quinn will call her when they are available.  Walked specimen to [place] outpatient lab. Support provided, invited calls.

## 2024-09-25 NOTE — PROGRESS NOTES
"History:  This is a 69 year old female who I'm asked to see by Dr. Dee for a left breast punch biopsy.  She has a history of bilateral breast cancer.  Was diagnosed in July 2014 with a left triple negative [id number] tumor.  In July 2017 she had a triple negative tumor involving the right breast.  She was a clinical stage T2N1M0.  She underwent breast conservation therapy to each breast.  She did suffer from some healing issues on the left side.  She states that over the course of this last year the left breast has become harder and has developed more skin darkening.    Allergies:  See list    Past medical history:  Bilateral breast cancer - treated with breast preservation therapy  GERD    Past surgical history:  Bilateral lumpectomy with SLN biopsy  Port placement and removal  Hysterectomy    Medications:    acetaminophen (TYLENOL) 325 MG tablet, Take 650 mg by mouth as needed for mild pain, Disp: , Rfl:     albuterol (PROAIR HFA;PROVENTIL HFA;VENTOLIN HFA) 90 mcg/actuation inhaler, [ALBUTEROL (PROAIR HFA;PROVENTIL HFA;VENTOLIN HFA) 90 MCG/ACTUATION INHALER] Inhale 2 puffs every 4 (four) hours as needed for wheezing., Disp: , Rfl:     Cholecalciferol (VITAMIN D3 PO), Take 1 capsule by mouth daily, Disp: , Rfl:     cyanocobalamin (VITAMIN B-12) 1000 MCG tablet, Take 1,000 mcg by mouth daily, Disp: , Rfl:     fexofenadine (ALLEGRA) 180 MG tablet, Take 180 mg by mouth as needed, Disp: , Rfl:     ibuprofen (ADVIL/MOTRIN) 200 MG tablet, Take 400 mg by mouth as needed for pain, Disp: , Rfl:     montelukast (SINGULAIR) 10 MG tablet, Take 1 tablet by mouth daily, Disp: , Rfl:     multivitamin therapeutic (THERAGRAN) tablet, Take 1 tablet by mouth every evening, Disp: , Rfl:     omeprazole (PRILOSEC) 20 MG DR capsule, Take 20 mg by mouth daily, Disp: , Rfl:     prochlorperazine (COMPAZINE) 5 MG tablet, Take 5 mg by mouth 3 times daily as needed, Disp: , Rfl:     Physical Exam:  Ht 1.556 m (5' 1.25\")   Wt 79.8 kg (176 lb)  "  LMP  (LMP Unknown)   BMI 32.98 kg/m    General: Alert, cooperative, appears stated age   Breasts: Multiple scars scattered over each breast.  The left side is smaller and less ptotic compared to the right.  There are lymphedema changes to the left breast.  It is more full along the lower aspect.  There is hyperpigmentation surrounding the nipple areolar complex that resembles stasis dermatitis.    Imaging:  Pertinent images personally reviewed by myself and discussed with the patient.    Radiology reports:  EXAM: MA DIAGNOSTIC LEFT W/ YINKA, US BREAST LEFT LIMITED 1-3 QUADRANTS  DATE: 9/23/2024 8:49 AM     HISTORY:  69-year-old female with personal history of bilateral breast  cancer treated with breast conservation in 2018 on the right in 2014  on the left. Patient presents with symptoms of progressive left breast  skin thickening, breast hardening, and skin changes.      COMPARISON: Prior mammogram exams, including 2/14/2024, 8/16/2023, 8  5022, 8/10/2021, 7/21/2020 .     BREAST DENSITY: There are scattered areas of fibroglandular density.     MAMMOGRAM FINDINGS: Left digital diagnostic mammograms were performed   with computer aided detection. Digital breast tomosynthesis was used  in interpretation. Postsurgical changes in the deep central breast at  posterior depth with new skin thickening of the anterior breast.      ULTRASOUND FINDINGS:  Targeted ultrasound of the left breast in the  area of clinical concern demonstrates moderate to marked skin  thickening, most prominent centrally in close proximity to the areola,  but the skin measures up to 7-8 mm. No suspicious masses within the  breast. Clinically, there is brownish discoloration and induration of  the skin of of the periareolar left breast extending peripherally.                                                                   IMPRESSION: BI-RADS CATEGORY: 4 - Suspicious.     My interpretation:  Skin thickening seen to both  breasts    Pathology:  BREAST, RIGHT, 10:00, 5 CM FROM NIPPLE, MASS, ULTRASOUND-GUIDED NEEDLE CORE BIOPSY:  -BENIGN BREAST TISSUE WITH STROMAL HYALINIZATION    IMPRESSION:  Bilateral breast lymphedema with the left side worse than the right      PLAN:   At the request of oncology and radiology, a punch biopsy was performed on the left breast.  This involved an area of hyperpigmentation within the upper inner quadrant just above the areolar border.  The skin was cleansed with alcohol and anesthetized with 1% lidocaine.  A 4 mm punch was utilized to obtain full-thickness skin which was placed in formalin.  The punch site was then reapproximated with 4-0 nylon and covered with gauze.    Her tissue will be sent to the lab for pathologic diagnosis.  A referral was sent to lymphedema therapy.  I will give her a call next week when I have the results from the lab.  A suture removal kit was sent home with the patient.  In 1 week she will remove the suture.    45 minutes was spent in chart prep, discussion, documentation, and performing the punch biopsy.    Parvin Quinn DO  General Surgeon  [facility]  Breast [address] [address]  Office: [phone]  Employed by - [facility]

## 2024-09-25 NOTE — LETTER
9/25/2024      Willa Hernandez  [address] [facility] [address]      Dear Colleague,    Thank you for referring your patient, Willa Hernandez, to the [facility] [place]. Please see a copy of my visit note below.    History:  This is a 69 year old female who I'm asked to see by Dr. Dee for a left breast punch biopsy.  She has a history of bilateral breast cancer.  Was diagnosed in July 2014 with a left triple negative [id number] tumor.  In July 2017 she had a triple negative tumor involving the right breast.  She was a clinical stage T2N1M0.  She underwent breast conservation therapy to each breast.  She did suffer from some healing issues on the left side.  She states that over the course of this last year the left breast has become harder and has developed more skin darkening.    Allergies:  See list    Past medical history:  Bilateral breast cancer - treated with breast preservation therapy  GERD    Past surgical history:  Bilateral lumpectomy with SLN biopsy  Port placement and removal  Hysterectomy    Medications:     acetaminophen (TYLENOL) 325 MG tablet, Take 650 mg by mouth as needed for mild pain, Disp: , Rfl:      albuterol (PROAIR HFA;PROVENTIL HFA;VENTOLIN HFA) 90 mcg/actuation inhaler, [ALBUTEROL (PROAIR HFA;PROVENTIL HFA;VENTOLIN HFA) 90 MCG/ACTUATION INHALER] Inhale 2 puffs every 4 (four) hours as needed for wheezing., Disp: , Rfl:      Cholecalciferol (VITAMIN D3 PO), Take 1 capsule by mouth daily, Disp: , Rfl:      cyanocobalamin (VITAMIN B-12) 1000 MCG tablet, Take 1,000 mcg by mouth daily, Disp: , Rfl:      fexofenadine (ALLEGRA) 180 MG tablet, Take 180 mg by mouth as needed, Disp: , Rfl:      ibuprofen (ADVIL/MOTRIN) 200 MG tablet, Take 400 mg by mouth as needed for pain, Disp: , Rfl:      montelukast (SINGULAIR) 10 MG tablet, Take 1 tablet by mouth daily, Disp: , Rfl:      multivitamin therapeutic (THERAGRAN) tablet, Take 1 tablet by mouth every evening,  "Disp: , Rfl:      omeprazole (PRILOSEC) 20 MG DR capsule, Take 20 mg by mouth daily, Disp: , Rfl:      prochlorperazine (COMPAZINE) 5 MG tablet, Take 5 mg by mouth 3 times daily as needed, Disp: , Rfl:     Physical Exam:  Ht 1.556 m (5' 1.25\")   Wt 79.8 kg (176 lb)   LMP  (LMP Unknown)   BMI 32.98 kg/m    General: Alert, cooperative, appears stated age   Breasts: Multiple scars scattered over each breast.  The left side is smaller and less ptotic compared to the right.  There are lymphedema changes to the left breast.  It is more full along the lower aspect.  There is hyperpigmentation surrounding the nipple areolar complex that resembles stasis dermatitis.    Imaging:  Pertinent images personally reviewed by myself and discussed with the patient.    Radiology reports:  EXAM: MA DIAGNOSTIC LEFT W/ YINKA, US BREAST LEFT LIMITED 1-3 QUADRANTS  DATE: 9/23/2024 8:49 AM     HISTORY:  69-year-old female with personal history of bilateral breast  cancer treated with breast conservation in 2018 on the right in 2014  on the left. Patient presents with symptoms of progressive left breast  skin thickening, breast hardening, and skin changes.      COMPARISON: Prior mammogram exams, including 2/14/2024, 8/16/2023, 8  5022, 8/10/2021, 7/21/2020 .     BREAST DENSITY: There are scattered areas of fibroglandular density.     MAMMOGRAM FINDINGS: Left digital diagnostic mammograms were performed   with computer aided detection. Digital breast tomosynthesis was used  in interpretation. Postsurgical changes in the deep central breast at  posterior depth with new skin thickening of the anterior breast.      ULTRASOUND FINDINGS:  Targeted ultrasound of the left breast in the  area of clinical concern demonstrates moderate to marked skin  thickening, most prominent centrally in close proximity to the areola,  but the skin measures up to 7-8 mm. No suspicious masses within the  breast. Clinically, there is brownish discoloration and " induration of  the skin of of the periareolar left breast extending peripherally.                                                                   IMPRESSION: BI-RADS CATEGORY: 4 - Suspicious.     My interpretation:  Skin thickening seen to both breasts    Pathology:  BREAST, RIGHT, 10:00, 5 CM FROM NIPPLE, MASS, ULTRASOUND-GUIDED NEEDLE CORE BIOPSY:  -BENIGN BREAST TISSUE WITH STROMAL HYALINIZATION    IMPRESSION:  Bilateral breast lymphedema with the left side worse than the right      PLAN:   At the request of oncology and radiology, a punch biopsy was performed on the left breast.  This involved an area of hyperpigmentation within the upper inner quadrant just above the areolar border.  The skin was cleansed with alcohol and anesthetized with 1% lidocaine.  A 4 mm punch was utilized to obtain full-thickness skin which was placed in formalin.  The punch site was then reapproximated with 4-0 nylon and covered with gauze.    Her tissue will be sent to the lab for pathologic diagnosis.  A referral was sent to lymphedema therapy.  I will give her a call next week when I have the results from the lab.    45 minutes was spent in chart prep, discussion, documentation, and performing the punch biopsy.    Parvin Quinn DO  General Surgeon  [facility]  Breast [address] [address]  Office: [phone]  Employed by - [facility]      Again, thank you for allowing me to participate in the care of your patient.        Sincerely,        Parvin Quinn DO

## 2024-10-02 DIAGNOSIS — L94.0 MORPHEA: Primary | ICD-10-CM

## 2024-10-02 NOTE — PROGRESS NOTES
I discussed the punch biopsy results with the patient over the phone.  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  Clinically I agree that this is what her breast looks like.  I would like for her to see a rheumatologist to discuss systemic treatment of this unusual phenomena.  If she has clinical worsening and is unable to tolerate systemic treatment, she can always return to the breast center to discuss mastectomy.  She plans on removing the stitch later today.  She will give the breast center a call if she has any further questions or concerns.    Parvin Quinn DO  General Surgeon  [facility]  [place] [facility] [place]  [address] [place] [id number]  Office: [phone]  Employed by - [facility]

## 2024-10-04 ENCOUNTER — ONCOLOGY VISIT (OUTPATIENT)
Dept: ONCOLOGY | Facility: HOSPITAL | Age: 69
End: 2024-10-04
Payer: COMMERCIAL

## 2024-10-04 VITALS
HEIGHT: 61 IN | WEIGHT: 175 LBS | RESPIRATION RATE: 20 BRPM | DIASTOLIC BLOOD PRESSURE: 75 MMHG | BODY MASS INDEX: 33.04 KG/M2 | HEART RATE: 81 BPM | SYSTOLIC BLOOD PRESSURE: 144 MMHG | OXYGEN SATURATION: 99 % | TEMPERATURE: 97.9 F

## 2024-10-04 DIAGNOSIS — C50.411 MALIGNANT NEOPLASM OF UPPER-OUTER QUADRANT OF RIGHT BREAST IN FEMALE, ESTROGEN RECEPTOR NEGATIVE (H): Primary | ICD-10-CM

## 2024-10-04 DIAGNOSIS — Z17.1 MALIGNANT NEOPLASM OF UPPER-OUTER QUADRANT OF RIGHT BREAST IN FEMALE, ESTROGEN RECEPTOR NEGATIVE (H): Primary | ICD-10-CM

## 2024-10-04 DIAGNOSIS — L94.0 MORPHEA: ICD-10-CM

## 2024-10-04 PROCEDURE — G2211 COMPLEX E/M VISIT ADD ON: HCPCS | Performed by: INTERNAL MEDICINE

## 2024-10-04 PROCEDURE — 99214 OFFICE O/P EST MOD 30 MIN: CPT | Performed by: INTERNAL MEDICINE

## 2024-10-04 PROCEDURE — G0463 HOSPITAL OUTPT CLINIC VISIT: HCPCS | Performed by: INTERNAL MEDICINE

## 2024-10-04 ASSESSMENT — PAIN SCALES - GENERAL: PAINLEVEL: NO PAIN (0)

## 2024-10-04 NOTE — PROGRESS NOTES
"Oncology Rooming Note    October 4, 2024 9:08 AM   Willa Hernandez is a 69 year old female who presents for:    Chief Complaint   Patient presents with    Oncology Clinic Visit     4 week follow up     Initial Vitals: BP (!) 144/75 (BP Location: Right arm, Patient Position: Sitting, Cuff Size: Adult Large)   Pulse 81   Temp 97.9  F (36.6  C) (Tympanic)   Resp 20   Ht 1.556 m (5' 1.25\")   Wt 79.4 kg (175 lb)   LMP  (LMP Unknown)   SpO2 99%   BMI 32.80 kg/m   Estimated body mass index is 32.8 kg/m  as calculated from the following:    Height as of this encounter: 1.556 m (5' 1.25\").    Weight as of this encounter: 79.4 kg (175 lb). Body surface area is 1.85 meters squared.  No Pain (0) Comment: right knee   No LMP recorded (lmp unknown). Patient has had a hysterectomy.  Allergies reviewed: Yes  Medications reviewed: Yes    Medications: Medication refills not needed today.  Pharmacy name entered into [facility]: [id number] - [address]     Frailty Screening:   Is the patient here for a new oncology consult visit in cancer care? 2. No      Clinical concerns:       JAIME VARGAS CMA            "

## 2024-10-04 NOTE — LETTER
"10/4/2024      [facility]  [address] [facility] [address]      Dear Colleague,    Thank you for referring your patient, Willa Hernandez, to the [facility]. Please see a copy of my visit note below.    Oncology Rooming Note    October 4, 2024 9:08 AM   Willa Hernandez is a 69 year old female who presents for:    Chief Complaint   Patient presents with     Oncology Clinic Visit     4 week follow up     Initial Vitals: BP (!) 144/75 (BP Location: Right arm, Patient Position: Sitting, Cuff Size: Adult Large)   Pulse 81   Temp 97.9  F (36.6  C) (Tympanic)   Resp 20   Ht 1.556 m (5' 1.25\")   Wt 79.4 kg (175 lb)   LMP  (LMP Unknown)   SpO2 99%   BMI 32.80 kg/m   Estimated body mass index is 32.8 kg/m  as calculated from the following:    Height as of this encounter: 1.556 m (5' 1.25\").    Weight as of this encounter: 79.4 kg (175 lb). Body surface area is 1.85 meters squared.  No Pain (0) Comment: right knee   No LMP recorded (lmp unknown). Patient has had a hysterectomy.  Allergies reviewed: Yes  Medications reviewed: Yes    Medications: Medication refills not needed today.  Pharmacy name entered into [employer]: [facility][id number] - [place] [id number] [place]     Frailty Screening:   Is the patient here for a new oncology consult visit in cancer care? 2. No      Clinical concerns:       JAIME VARGAS CMA              [facility]    Patient: Willa Hernandez  MRN: [id number]  Date of Service: Oct 4, 2024        Assessment and Plan:    Breast cancer    Primary site: Breast (Left)    Staging method: AJCC 7th Edition    Clinical: Stage IA (T1c, N0, cM0) - Signed by Moise Dee MD on 8/13/2014    Pathologic free text: [id number]    Summary: Stage IA (T1c, N0, cM0)     1. Breast cancer: Continues to have no evidence of recurrence.  She recently had a diagnosis of skin thickening on the right " breast which was negative for any evidence of malignancy.  Will continue to follow clinically and radiographically.  She is considering a mastectomy now that she has a diagnosis of radiation-induced morphea.    2.  Radiation-induced morphea: Unusual diagnosis.  She seems to have a relatively large area of over the breast and involvement deeper into the dermis.  I am not sure topicals would be effective.  She does have a referral to rheumatology for evaluation for underlying autoimmune disorders.  I am going to start her on methotrexate orally.  May also need to consider ultraviolet  A or B phototherapy.    Medical decision Making:  I spent 31 minutes in the care of this patient today, which included time necessary for preparation for the visit, face to face time with the patient, communication of recommendations to the care team, and documentation time.    ECOG Performance  0    Diagnosis:    1. Invasive ductal carcinoma of the left breast:  Triple negative. Stage [id number]. Diagnosed July 2014. She had a positive margin at initial surgery. BRCA-.     2.  Invasive ductal carcinoma of the right breast: Triple negative.  Grade 3.  Clinical stage T2N1M0. Diagnosed July 2017. MRI shows 3 foci and probable intervening tumor between these 3 foci.  Total size of abnormality in the MRI was 4.2 cm. One suspicious-appearing node in the axilla.     3.  Pneumonitis: CT on October 11, 2017 shows diffuse bilateral mixed airspace and interstitial infiltrates. Possibly due to chemotherapy (taxol).  Bronchoscopy was done on October 20. Cultures negative.  She seemed to respond to steroids.    Treatment:    She had a lumpectomy followed by 4 cycles of Taxotere and Cytoxan. This was completed in October 2014. She then had a reresection for the positive margin. She then had adjuvant radiation completed in February 2015.      Recurrence:  She was treated with neoadjuvant Adriamycin and Cytoxan for her second, most recent diagnosis.  Chemotherapy started on August 16, 2017.  Cycle 4 given on September 27.  Weekly Taxol started October 11.  Taxol reduced 25% for neuropathy. Last was completed on January 17, 2018     Right-sided lumpectomy performed in February 12, 2018.  Pathology showed complete response.  No residual disease.  Adjuvant radiation completed on May 11, 2018    Interim History:    Willa returns today for a follow-up visit. She recently had a breast biopsy of some skin changes on the left side.  She notes that over the past few months it been continuing to evolve.  Some discomfort but not much pain.  No acute complaints today.    Review of Systems:    As above in the history.     Review of Systems otherwise Negative for:  General: chills, fever or night sweats  Psychological: anxiety or depression  Ophthalmic: blurry vision, double vision or loss of vision, vision change  ENT: epistaxis, oral lesions, hearing changes  Hematological and Lymphatic: bleeding, bruising, jaundice, swollen lymph nodes  Endocrine: hot flashes, unexpected weight changes  Respiratory: cough, hemoptysis, orthopnea or shortness of breath/ROSE  Cardiovascular: chest pain, edema, palpitations or PND  Gastrointestinal: abdominal pain, blood in stools, change in bowel habits, constipation, diarrhea or nausea/vomiting  Genito-Urinary: change in urinary stream, incontinence, frequency/urgency  Musculoskeletal: joint pain, stiffness, swelling, muscle pain  Neurological: dizziness, headaches  Dermatological: rash    Past History:    Past Medical History:   Diagnosis Date     Asthma      Asthma      Breast cancer (H) 2014    left     Breast cancer (H) 2017    right     Breast cancer (H) 2014    left     Breast cancer (H) 2017    right     Bronchitis, chronic (H)      Bronchitis, chronic (H)      Colon polyp 2009    one precancerous polyp     Colon polyp 2009    one precancerous polyp     Cytoxan-induced pulmonary interstitial disease (H)      Cytoxan-induced pulmonary  "interstitial disease (H)      Disease of thyroid gland      Disease of thyroid gland      GERD (gastroesophageal reflux disease)      GERD (gastroesophageal reflux disease)      Hx antineoplastic chemotherapy 2014     Hx antineoplastic chemotherapy 2014     Hx of radiation therapy 2014     Hx of radiation therapy 2014     Peripheral neuropathy     hands and feet     Peripheral neuropathy     hands and feet     Pneumonia      Pneumonia      Sinusitis      Sinusitis      Uterine polyp      Uterine polyp      Physical Exam:    BP (!) 144/75 (BP Location: Right arm, Patient Position: Sitting, Cuff Size: Adult Large)   Pulse 81   Temp 97.9  F (36.6  C) (Tympanic)   Resp 20   Ht 1.556 m (5' 1.25\")   Wt 79.4 kg (175 lb)   LMP  (LMP Unknown)   SpO2 99%   BMI 32.80 kg/m      General: patient appears stated age of 66 year old. Nontoxic and in no distress.   HEENT: Head: atraumatic, normocephalic. Sclerae anicteric.  Chest:  Normal respiratory effort  Cardiac:  No edema.   Abdomen: abdomen is non-distended  Extremities: normal tone and muscle bulk.  Skin: no lesions or rash on visible skin. Warm and dry.   CNS: alert and oriented. Grossly non-focal.   Psychiatric: normal mood and affect.     Lab Results:    No results found for this or any previous visit (from the past 168 hour(s)).     Imaging:    MA Diagnostic Left w/Yinka    Result Date: 9/23/2024  EXAM: MA DIAGNOSTIC LEFT W/ YINKA, US BREAST LEFT LIMITED 1-3 QUADRANTS DATE: 9/23/2024 8:49 AM HISTORY:  69-year-old female with personal history of bilateral breast cancer treated with breast conservation in 2018 on the right in 2014 on the left. Patient presents with symptoms of progressive left breast skin thickening, breast hardening, and skin changes. COMPARISON: Prior mammogram exams, including 2/14/2024, 8/16/2023, 8 5022, 8/10/2021, 7/21/2020 . BREAST DENSITY: There are scattered areas of fibroglandular density. MAMMOGRAM FINDINGS: Leftdigital diagnostic mammograms " were performed with computer aided detection. Digital breast tomosynthesis was used in interpretation. Postsurgical changes in the deep central breast at posterior depth with new skin thickening of the anterior breast.  ULTRASOUND FINDINGS:  Targeted ultrasound of the left breast in the area of clinical concern demonstrates moderate to marked skin thickening, most prominent centrally in close proximity to the areola, but the skin measures up to 7-8 mm. No suspicious masses within the breast. Clinically, there is brownish discoloration and induration of the skin of of the periareolar left breast extending peripherally.     IMPRESSION: BI-RADS CATEGORY: 4 - Suspicious. New left breast skin thickening and induration for which Skin Punch biopsy is recommended for definitive tissue diagnosis. The patient already has an appointment with Dr. Quinn scheduled for this Thursday. RECOMMENDED FOLLOW-UP: Biopsy. I personally discussed the findings and recommendations with the patient at the conclusion of the examination. SOSA LUA MD   SYSTEM ID:  [id number]    US Breast Unilateral Left    Result Date: 9/23/2024  EXAM: MA DIAGNOSTIC LEFT W/ YINKA, US BREAST LEFT LIMITED 1-3 QUADRANTS DATE: 9/23/2024 8:49 AM HISTORY:  69-year-old female with personal history of bilateral breast cancer treated with breast conservation in 2018 on the right in 2014 on the left. Patient presents with symptoms of progressive left breast skin thickening, breast hardening, and skin changes. COMPARISON: Prior mammogram exams, including 2/14/2024, 8/16/2023, 8 5022, 8/10/2021, 7/21/2020 . BREAST DENSITY: There are scattered areas of fibroglandular density. MAMMOGRAM FINDINGS: Leftdigital diagnostic mammograms were performed with computer aided detection. Digital breast tomosynthesis was used in interpretation. Postsurgical changes in the deep central breast at posterior depth with new skin thickening of the anterior breast.  ULTRASOUND FINDINGS:   Targeted ultrasound of the left breast in the area of clinical concern demonstrates moderate to marked skin thickening, most prominent centrally in close proximity to the areola, but the skin measures up to 7-8 mm. No suspicious masses within the breast. Clinically, there is brownish discoloration and induration of the skin of of the periareolar left breast extending peripherally.     IMPRESSION: BI-RADS CATEGORY: 4 - Suspicious. New left breast skin thickening and induration for which Skin Punch biopsy is recommended for definitive tissue diagnosis. The patient already has an appointment with Dr. Quinn scheduled for this Thursday. RECOMMENDED FOLLOW-UP: Biopsy. I personally discussed the findings and recommendations with the patient at the conclusion of the examination. SOSA LUA MD   SYSTEM ID:  [id number]       Signed by: Moise Dee MD      Again, thank you for allowing me to participate in the care of your patient.        Sincerely,        Moise Dee MD

## 2024-10-04 NOTE — PROGRESS NOTES
[facility] Progress Note    Patient: Willa Hernandez  MRN: [id number]  Date of Service: Oct 4, 2024        Assessment and Plan:    Breast cancer    Primary site: Breast (Left)    Staging method: AJCC 7th Edition    Clinical: Stage IA (T1c, N0, cM0) - Signed by Moise Dee MD on 8/13/2014    Pathologic free text: [id number]    Summary: Stage IA (T1c, N0, cM0)     1. Breast cancer: Continues to have no evidence of recurrence.  She recently had a diagnosis of skin thickening on the right breast which was negative for any evidence of malignancy.  Will continue to follow clinically and radiographically.  She is considering a mastectomy now that she has a diagnosis of radiation-induced morphea.    2.  Radiation-induced morphea: Unusual diagnosis.  She seems to have a relatively large area of over the breast and involvement deeper into the dermis.  I am not sure topicals would be effective.  She does have a referral to rheumatology for evaluation for underlying autoimmune disorders.  I am going to start her on methotrexate orally.  May also need to consider ultraviolet  A or B phototherapy.    Medical decision Making:  I spent 31 minutes in the care of this patient today, which included time necessary for preparation for the visit, face to face time with the patient, communication of recommendations to the care team, and documentation time.    ECOG Performance  0    Diagnosis:    1. Invasive ductal carcinoma of the left breast:  Triple negative. Stage [id number]. Diagnosed July 2014. She had a positive margin at initial surgery. BRCA-.     2.  Invasive ductal carcinoma of the right breast: Triple negative.  Grade 3.  Clinical stage T2N1M0. Diagnosed July 2017. MRI shows 3 foci and probable intervening tumor between these 3 foci.  Total size of abnormality in the MRI was 4.2 cm. One suspicious-appearing node in the axilla.     3.  Pneumonitis: CT on October 11, 2017 shows diffuse  bilateral mixed airspace and interstitial infiltrates. Possibly due to chemotherapy (taxol).  Bronchoscopy was done on October 20. Cultures negative.  She seemed to respond to steroids.    Treatment:    She had a lumpectomy followed by 4 cycles of Taxotere and Cytoxan. This was completed in October 2014. She then had a reresection for the positive margin. She then had adjuvant radiation completed in February 2015.      Recurrence:  She was treated with neoadjuvant Adriamycin and Cytoxan for her second, most recent diagnosis. Chemotherapy started on August 16, 2017.  Cycle 4 given on September 27.  Weekly Taxol started October 11.  Taxol reduced 25% for neuropathy. Last was completed on January 17, 2018     Right-sided lumpectomy performed in February 12, 2018.  Pathology showed complete response.  No residual disease.  Adjuvant radiation completed on May 11, 2018    Interim History:    Willa returns today for a follow-up visit. She recently had a breast biopsy of some skin changes on the left side.  She notes that over the past few months it been continuing to evolve.  Some discomfort but not much pain.  No acute complaints today.    Review of Systems:    As above in the history.     Review of Systems otherwise Negative for:  General: chills, fever or night sweats  Psychological: anxiety or depression  Ophthalmic: blurry vision, double vision or loss of vision, vision change  ENT: epistaxis, oral lesions, hearing changes  Hematological and Lymphatic: bleeding, bruising, jaundice, swollen lymph nodes  Endocrine: hot flashes, unexpected weight changes  Respiratory: cough, hemoptysis, orthopnea or shortness of breath/ROSE  Cardiovascular: chest pain, edema, palpitations or PND  Gastrointestinal: abdominal pain, blood in stools, change in bowel habits, constipation, diarrhea or nausea/vomiting  Genito-Urinary: change in urinary stream, incontinence, frequency/urgency  Musculoskeletal: joint pain, stiffness, swelling,  "muscle pain  Neurological: dizziness, headaches  Dermatological: rash    Past History:    Past Medical History:   Diagnosis Date    Asthma     Asthma     Breast cancer (H) 2014    left    Breast cancer (H) 2017    right    Breast cancer (H) 2014    left    Breast cancer (H) 2017    right    Bronchitis, chronic (H)     Bronchitis, chronic (H)     Colon polyp 2009    one precancerous polyp    Colon polyp 2009    one precancerous polyp    Cytoxan-induced pulmonary interstitial disease (H)     Cytoxan-induced pulmonary interstitial disease (H)     Disease of thyroid gland     Disease of thyroid gland     GERD (gastroesophageal reflux disease)     GERD (gastroesophageal reflux disease)     Hx antineoplastic chemotherapy 2014    Hx antineoplastic chemotherapy 2014    Hx of radiation therapy 2014    Hx of radiation therapy 2014    Peripheral neuropathy     hands and feet    Peripheral neuropathy     hands and feet    Pneumonia     Pneumonia     Sinusitis     Sinusitis     Uterine polyp     Uterine polyp      Physical Exam:    BP (!) 144/75 (BP Location: Right arm, Patient Position: Sitting, Cuff Size: Adult Large)   Pulse 81   Temp 97.9  F (36.6  C) (Tympanic)   Resp 20   Ht 1.556 m (5' 1.25\")   Wt 79.4 kg (175 lb)   LMP  (LMP Unknown)   SpO2 99%   BMI 32.80 kg/m      General: patient appears stated age of 66 year old. Nontoxic and in no distress.   HEENT: Head: atraumatic, normocephalic. Sclerae anicteric.  Chest:  Normal respiratory effort  Cardiac:  No edema.   Abdomen: abdomen is non-distended  Extremities: normal tone and muscle bulk.  Skin: no lesions or rash on visible skin. Warm and dry.   CNS: alert and oriented. Grossly non-focal.   Psychiatric: normal mood and affect.     Lab Results:    No results found for this or any previous visit (from the past 168 hour(s)).     Imaging:    MA Diagnostic Left w/Yinka    Result Date: 9/23/2024  EXAM: MA DIAGNOSTIC LEFT W/ YINKA, US BREAST LEFT LIMITED 1-3 QUADRANTS " DATE: 9/23/2024 8:49 AM HISTORY:  69-year-old female with personal history of bilateral breast cancer treated with breast conservation in 2018 on the right in 2014 on the left. Patient presents with symptoms of progressive left breast skin thickening, breast hardening, and skin changes. COMPARISON: Prior mammogram exams, including 2/14/2024, 8/16/2023, 8 5022, 8/10/2021, 7/21/2020 . BREAST DENSITY: There are scattered areas of fibroglandular density. MAMMOGRAM FINDINGS: Leftdigital diagnostic mammograms were performed with computer aided detection. Digital breast tomosynthesis was used in interpretation. Postsurgical changes in the deep central breast at posterior depth with new skin thickening of the anterior breast.  ULTRASOUND FINDINGS:  Targeted ultrasound of the left breast in the area of clinical concern demonstrates moderate to marked skin thickening, most prominent centrally in close proximity to the areola, but the skin measures up to 7-8 mm. No suspicious masses within the breast. Clinically, there is brownish discoloration and induration of the skin of of the periareolar left breast extending peripherally.     IMPRESSION: BI-RADS CATEGORY: 4 - Suspicious. New left breast skin thickening and induration for which Skin Punch biopsy is recommended for definitive tissue diagnosis. The patient already has an appointment with Dr. Quinn scheduled for this Thursday. RECOMMENDED FOLLOW-UP: Biopsy. I personally discussed the findings and recommendations with the patient at the conclusion of the examination. SOSA LUA MD   SYSTEM ID:  [id number]    US Breast Unilateral Left    Result Date: 9/23/2024  EXAM: MA DIAGNOSTIC LEFT W/ YINKA, US BREAST LEFT LIMITED 1-3 QUADRANTS DATE: 9/23/2024 8:49 AM HISTORY:  69-year-old female with personal history of bilateral breast cancer treated with breast conservation in 2018 on the right in 2014 on the left. Patient presents with symptoms of progressive left breast skin  thickening, breast hardening, and skin changes. COMPARISON: Prior mammogram exams, including 2/14/2024, 8/16/2023, 8 5022, 8/10/2021, 7/21/2020 . BREAST DENSITY: There are scattered areas of fibroglandular density. MAMMOGRAM FINDINGS: Leftdigital diagnostic mammograms were performed with computer aided detection. Digital breast tomosynthesis was used in interpretation. Postsurgical changes in the deep central breast at posterior depth with new skin thickening of the anterior breast.  ULTRASOUND FINDINGS:  Targeted ultrasound of the left breast in the area of clinical concern demonstrates moderate to marked skin thickening, most prominent centrally in close proximity to the areola, but the skin measures up to 7-8 mm. No suspicious masses within the breast. Clinically, there is brownish discoloration and induration of the skin of of the periareolar left breast extending peripherally.     IMPRESSION: BI-RADS CATEGORY: 4 - Suspicious. New left breast skin thickening and induration for which Skin Punch biopsy is recommended for definitive tissue diagnosis. The patient already has an appointment with Dr. Quinn scheduled for this Thursday. RECOMMENDED FOLLOW-UP: Biopsy. I personally discussed the findings and recommendations with the patient at the conclusion of the examination. SOSA LUA MD   SYSTEM ID:  [id number]       Signed by: Moise Dee MD

## 2024-10-07 ENCOUNTER — PATIENT OUTREACH (OUTPATIENT)
Dept: ONCOLOGY | Facility: HOSPITAL | Age: 69
End: 2024-10-07
Payer: COMMERCIAL

## 2024-10-07 NOTE — PROGRESS NOTES
[facility]: Cancer Care                                                                                          Patient was seen in clinic on 10/4. Dr. Dee is to connect with a rheumatologist regarding a certain medication that he would like to prescribe and then follow up with her once he has had a chance to connect with the rheumatologist.    Signature:  Belle Valladares RN

## 2024-10-09 ENCOUNTER — THERAPY VISIT (OUTPATIENT)
Dept: PHYSICAL THERAPY | Facility: REHABILITATION | Age: 69
End: 2024-10-09
Attending: SURGERY
Payer: COMMERCIAL

## 2024-10-09 DIAGNOSIS — I89.0 LYMPHEDEMA OF BREAST: ICD-10-CM

## 2024-10-09 PROCEDURE — 97161 PT EVAL LOW COMPLEX 20 MIN: CPT | Mod: GP | Performed by: PHYSICAL THERAPIST

## 2024-10-09 PROCEDURE — 97140 MANUAL THERAPY 1/> REGIONS: CPT | Mod: GP | Performed by: PHYSICAL THERAPIST

## 2024-10-09 NOTE — PROGRESS NOTES
PHYSICAL THERAPY EVALUATION  Type of Visit: Evaluation       Fall Risk Screen:  Fall screen completed by: PT  Have you fallen 2 or more times in the past year?: No  Have you fallen and had an injury in the past year?: No  Is patient a fall risk?: No      Patient reports she had cancer in the Left breat initially 10 years ago and had lumpectomy, radiation and chemotherapy, 7 years ago she had cancer in the right breast and had same treatments, since January she noted a steady increase in edema and hardening on the left breast mostly. She reports having a biopsy done 9/25/24 and it has noted to come back no cancer but with radiation induced morphea.   She reports having been taught self MLD after the left breast surgery initially due to where the scar was and possible restriction to lymphatic flow and reports some changes to edema in her left arm at times She does have an older compression bra but the zipper broke, was going to fix it herself, could potentionally need a new compression bra.   Patient is considering getting bilateral mastectomy now with new diagnosis, but has not made a final decision on this as of yet.         Subjective       Presenting condition or subjective complaint: Breast cancer twice 10 and 7 years ago. Dealing with fluid on both and development of Radiation Induced Morphia on left breast. ( swelling, bruising, hardening)  Date of onset: 09/25/24 (Date of PT Order)    Relevant medical history: Cancer; Change in skin color; Radiation treatment   Dates & types of surgery: 2014, 2017, biopsy rt breast 2024    Prior diagnostic imaging/testing results: Other Ultrasound, mamogram   Prior therapy history for the same diagnosis, illness or injury: No      Prior Level of Function  Transfers: Independent  Ambulation: Independent  ADL: Independent    Living Environment  Social support: With a significant other or spouse   Type of home: House   Stairs to enter the home: Yes 1 Is there a railing: Yes      Ramp: No   Stairs inside the home: Yes 1 Is there a railing: Yes     Help at home: Other  Equipment owned:       Employment: No    Hobbies/Interests:      Patient goals for therapy: Drainage issues mostly on left.    Pain assessment: See objective evaluation for additional pain details     Objective       EDEMA EVALUATION  Additional history:  Body part affected by edema: Breast  If cancer related, treatment: Drs still assessing. Stsrting with pt.  If not cancer related, problems with veins or cause of swelling:    Distance able to walk: N/A  Time able to stand: N/A  Sensation problems in hands/feet: Yes Neuropathy infeet from chemo  Edema etiology: Cancer with lymph node dissection, Chemo, Radiation, Surgery    FUNCTIONAL SCALES   Lymphedema Life Impact Scale (LLIS):      Cognitive Status Examination  Orientation: Oriented to person, place and time   Level of Consciousness: Alert  Follows Commands and Answers Questions: 100% of the time  Personal Safety and Judgement: Intact  Memory: Intact    EDEMA  Skin Condition: Intact, firm and fribrotic areas around areola and inferior breast tissue, increase in color changes on L breast due to the radiation induced morphea, nearly healed punch biopsy site  Scar: Yes  Location: lumpectomy scar and port site on right side, larger lumpectomy scar inferior breast, smaller re-excision lumpectomy site superior to the nipple and post site scar on the L side - all well healed and closed; increase in scar tissue noted on the lower scar on L breast   Capillary Refill: Symmetrical  Radial Pulse: Symmetrical  Ulceration: No    GIRTH MEASUREMENTS: Refer to separate girth measurement flowsheet for specific measurements.    VOLUME UE  Right UE Total Volume (mL): 2245.35  Left UE Total Volume (mL): 2157.22  UE Limb Comparison:  Identify AFFECTED Limb Volume-Vi (ml): 2157.2  Identify UNAFFECTED Limb Volume-Vu (ml): 2245.4  % UE Swelling: -4.1  UE Limb % Difference: -3.93    RANGE OF  MOTION: LE ROM WFL  UE ROM WFL  STRENGTH: UE Strength WFL, LE Strength WFL  POSTURE: Sitting Posture: Rounded shoulders    Assessment & Plan   CLINICAL IMPRESSIONS  Medical Diagnosis: Lymphedema of Breast    Treatment Diagnosis: Lymphedema of Breast   Impression/Assessment: Patient is a 69 year old female with complaints of breast changes and lymphedema following breat cancer treatments with chemotherapy, surgery and radiation.  The following significant findings have been identified: Decreased ROM/flexibility, Inflammation, Edema, Decreased activity tolerance, and Impaired posture. These impairments interfere with their ability to perform recreational activities as compared to previous level of function.     Clinical Decision Making (Complexity):  Clinical Presentation: Stable/Uncomplicated  Clinical Presentation Rationale: based on medical and personal factors listed in PT evaluation  Clinical Decision Making (Complexity): Low complexity    PLAN OF CARE  Treatment Interventions:  Interventions: Manual Therapy, Neuromuscular Re-education, Therapeutic Activity, Therapeutic Exercise, Self-Care/Home Management, Gradient Compression Bandaging    Long Term Goals     PT Goal 1  Goal Identifier: HEP  Goal Description: Patient will be independent in a HEP for ongoing symptom managmenet in 90 days  Target Date: 01/07/25  PT Goal 2  Goal Identifier: self care and home management techniques  Goal Description: Patient will be independent in self care and home management techniuqes such as self MLD, skin care and compression use when appropriate for ongoing symptom management in 90 days  Target Date: 01/07/25      Frequency of Treatment: 1- 2 times per week  Duration of Treatment: up to 90 days    Recommended Referrals to Other Professionals:  possible fitting for new compression - if appropriate   Education Assessment:   Learner/Method: Patient;Reading;Listening;Demonstration;Pictures/Video;No Barriers to Learning    Risks and  benefits of evaluation/treatment have been explained.   Patient/Family/caregiver agrees with Plan of Care.     Evaluation Time:     PT Eval, Low Complexity Minutes (04382): 30   Present: Not applicable     Signing Clinician: [facility]                                                                                   OUTPATIENT PHYSICAL THERAPY      PLAN OF TREATMENT FOR OUTPATIENT REHABILITATION   Patient's Last Name, First Name, Willa Howard YOB: 1955   Provider's Name   [facility]   Medical Record No.  [id number]     Onset Date: 09/25/24 (Date of PT Order)  Start of Care Date: 10/09/24     Medical Diagnosis:  Lymphedema of Breast      PT Treatment Diagnosis:  Lymphedema of Breast Plan of Treatment  Frequency/Duration: 1- 2 times per week/ up to 90 days    Certification date from 10/09/24 to 01/07/25         See note for plan of treatment details and functional goals     Laury De Dios, PT                         I CERTIFY THE NEED FOR THESE SERVICES FURNISHED UNDER        THIS PLAN OF TREATMENT AND WHILE UNDER MY CARE     (Physician attestation of this document indicates review and certification of the therapy plan).              Referring Provider:  Parvin Quinn    Initial Assessment  See Epic Evaluation- Start of Care Date: 10/09/24

## 2024-10-23 ENCOUNTER — THERAPY VISIT (OUTPATIENT)
Dept: PHYSICAL THERAPY | Facility: REHABILITATION | Age: 69
End: 2024-10-23
Attending: SURGERY
Payer: COMMERCIAL

## 2024-10-23 DIAGNOSIS — I89.0 LYMPHEDEMA OF BREAST: Primary | ICD-10-CM

## 2024-10-23 PROCEDURE — 97140 MANUAL THERAPY 1/> REGIONS: CPT | Mod: GP | Performed by: PHYSICAL THERAPIST

## 2024-11-01 ENCOUNTER — THERAPY VISIT (OUTPATIENT)
Dept: PHYSICAL THERAPY | Facility: REHABILITATION | Age: 69
End: 2024-11-01
Attending: SURGERY
Payer: COMMERCIAL

## 2024-11-01 DIAGNOSIS — I89.0 LYMPHEDEMA OF BREAST: Primary | ICD-10-CM

## 2024-11-01 PROCEDURE — 97140 MANUAL THERAPY 1/> REGIONS: CPT | Mod: GP | Performed by: PHYSICAL THERAPIST

## 2024-11-04 ENCOUNTER — TELEPHONE (OUTPATIENT)
Dept: SURGERY | Facility: CLINIC | Age: 69
End: 2024-11-04
Payer: COMMERCIAL

## 2024-11-04 DIAGNOSIS — L94.0 MORPHEA: Primary | ICD-10-CM

## 2024-11-04 NOTE — TELEPHONE ENCOUNTER
Patient called, she had been referred out by Dr. Quinn for her Morphea but has not yet been called with an appointment. Reviewed previous referral, placed new one at the request of Adult Derm for patient to see Autoimmune Dermatologist.  Called Willa back to let her know and tell her to expect a phone call from Derm with an appointment.

## 2024-11-06 ENCOUNTER — PATIENT OUTREACH (OUTPATIENT)
Dept: ONCOLOGY | Facility: HOSPITAL | Age: 69
End: 2024-11-06
Payer: COMMERCIAL

## 2024-11-06 NOTE — PROGRESS NOTES
[facility]: Cancer Care                                                                                          Called patient to check in on her per the request of Dr. Dee. Patient said she was referred to dermatology instead of rheumatology and has an upcoming appt with a dermatologist that specializes in autoimmune disorders at the  on 11/26. She said her left breast has been getting better in terms of the morphea, but she said she hasn't started methotrexate yet since she hasn't seen dermatology yet. I relayed that information over to Dr. Dee for awareness.     Signature:  Belle Valladares RN

## 2024-11-16 NOTE — TELEPHONE ENCOUNTER
FUTURE VISIT INFORMATION      FUTURE VISIT INFORMATION:  Date: 11.26.24  Time: 8:00  Location: [facility]  REFERRAL INFORMATION:  Referring provider:  [facility]  Referring providers clinic:  [facility]  Reason for visit/diagnosis  per pt, referral for Morphea, recs in [facility]      RECORDS REQUESTED FROM:       Clinic name Comments Records Status Imaging Status   [facility] 11.4.24  [facility]

## 2024-11-26 ENCOUNTER — OFFICE VISIT (OUTPATIENT)
Dept: DERMATOLOGY | Facility: CLINIC | Age: 69
End: 2024-11-26
Attending: SURGERY
Payer: COMMERCIAL

## 2024-11-26 ENCOUNTER — PRE VISIT (OUTPATIENT)
Dept: DERMATOLOGY | Facility: CLINIC | Age: 69
End: 2024-11-26

## 2024-11-26 DIAGNOSIS — M34.9 SCLERODERMA (H): ICD-10-CM

## 2024-11-26 DIAGNOSIS — L20.89 OTHER ATOPIC DERMATITIS: Primary | ICD-10-CM

## 2024-11-26 DIAGNOSIS — Z51.81 THERAPEUTIC DRUG MONITORING: ICD-10-CM

## 2024-11-26 DIAGNOSIS — L94.0 MORPHEA: ICD-10-CM

## 2024-11-26 PROCEDURE — 99205 OFFICE O/P NEW HI 60 MIN: CPT | Mod: GC | Performed by: STUDENT IN AN ORGANIZED HEALTH CARE EDUCATION/TRAINING PROGRAM

## 2024-11-26 RX ORDER — FOLIC ACID 1 MG/1
1 TABLET ORAL DAILY
Qty: 30 TABLET | Refills: 6 | Status: SHIPPED | OUTPATIENT
Start: 2024-11-26

## 2024-11-26 RX ORDER — TACROLIMUS 1 MG/G
OINTMENT TOPICAL 2 TIMES DAILY
Qty: 100 G | Refills: 6 | Status: SHIPPED | OUTPATIENT
Start: 2024-11-26

## 2024-11-26 RX ORDER — METHOTREXATE 2.5 MG/1
25 TABLET ORAL
Qty: 40 TABLET | Refills: 6 | Status: SHIPPED | OUTPATIENT
Start: 2024-11-26

## 2024-11-26 ASSESSMENT — PAIN SCALES - GENERAL: PAINLEVEL_OUTOF10: NO PAIN (0)

## 2024-11-26 NOTE — PATIENT INSTRUCTIONS
See below for instructions of your new regimen:     1.  Take 10 pills of methotrexate (25 mg) weekly, to the best of your ability you should take these on the same day.  You can occasionally get some mild nausea or loose stools after taking the medication if this were to happen and you found it intolerable you could split up your dose between 2 days instead of 1 day out of the week.   2.  Take 1 mg of folic acid which can taken by pill, obtained over the counter, or by prescription in a liquid  3.  You will need regular lab monitoring to check your blood counts and metabolic panel to make sure that the medication is not having any adverse effects.  Initially the interval for these lab checks will be biweekly, and eventually it would be as far spaced out as every 3 months.  We will always want to draw labs 6 days after your last dose of methotrexate, or said another way, the day before your next dose     Here is what your schedule look like during the fused first few weeks of this regimen:     Start methotrexate on day 0, redose weekly   Start folate 1 mg pills (or liquid) daily on day 1, hold folate on the day(s) that you take methotrexate   Take your second dose of methotrexate on day 7   The day before your third dose on day 13, have labs checked   Take your third dose of methotrexate on day 14   We will then recheck labs another 2 weeks after that dose   If those labs are normal we will check labs again a month from the second lab draw   If those labs are normal we will check labs again 3 months from the last lab draw     Methotrexate works well, it does work slowly we should give it at least 6 to 8 weeks for it to hit it's stride and see if it is working.  If it does not work satisfactorily, then we can look at other options. We advise you not to drink more than 2 alcohol beverages per week and to not get pregnant    I know this is a lot of information please message me or request a phone call and we can talk  more if there is anything that I need to explain more clearly      Best,     EM           Tacrolimus ointment twice daily as needed for the rash

## 2024-11-26 NOTE — PROGRESS NOTES
I have personally examined this patient and agree with the resident doctor's documentation and plan of care. I have reviewed and amended the resident's note. The documentation accurately reflects my clinical observations, diagnoses, treatment and follow-up plans.     Christo Banda MD  Dermatology Staff      [facility] Dermatology Note  Encounter Date: Nov 26, 2024  Office Visit     Dermatology Problem List:  1. Radiation-induced morphea  - Current tx: Methotrexate 25 mg Qweek, Folate, Tacrolimus ointment  2. Breast Cancer (Triple negative,  Left breast 7/2014, Right breast 7/2017)  - Taxotere/Cytoxan (10/2014), adjuvant radiation 2/2015  - Adriamycin/Cytoxan (8/2017), Radiation 5/2018    ____________________________________________    Assessment & Plan:     # Radiation-Induced Morphea (Continuing focal point for medical care services related to serious/complex condition)  (Severe condition with threat of bodily harm, therapy requiring intensive monitoring)  Patient presenting with radiation-induced morphea ~9 years after initial radiation course to the left breast, with skin biopsy confirming this diagnosis.  Discussed etiology and treatment options with the patient.  Patient notes that morphea overall does not bother her.  However, she may consider total mastectomy for breast cancer prevention, and was wondering if the morphea can interfere with this.  Given this, patient would benefit from treatments that can limit progression, and potentially offer some improvement.  Given this, we recommend starting with methotrexate.  Counseled on benefits and side effects, including immune modulating effects, liver changes, blood count changes.   - Start methotrexate 25mg/week +folic acid 1mg/d on non methotrexate days   - CBC, CMP with 2 weeks interval spacing out to every 3 mo   - start protopic application  -Not bothered by disease, but limiting progression of the sclerosis may preserve her ability to  have a mastectomy to reduce her chances of recurrence      Procedures Performed:   None    Follow-up: 4 month(s) in-person, or earlier for new or changing lesions    Staff and Resident:     Kranthi Almaraz MD  Internal Medicine - Dermatology (PGY-4)   ____________________________________________    CC: Autoimmune Disease (Morphea on left breast that began last year )    HPI:  Ms. Willa Hernandez is a(n) 69 year old female with PMH triple-negative breast cancer (bilateral, diagnosed at different time points). She has had chemoradiation 2014 for left breast cancer, and chemoradiation in 2017/2018 for right breast cancer.  Following treatment, patient has been managing chronic lymphedema and drainage.  Patient had swelling and fever that started in 1/2024.  The initial fever and swelling subsided, but during the summer time, she noted a color change with blue/black discoloration and swelling, followed by  development of rough dark plaques on the left breast.  Initially this was confined to the medial aspect of the left breast, but now has progressed more superiorly and laterally as well.  Overall, the patient notes that the rash does not necessarily bother her.  However, she is potentially interested in total mastectomy in the future, and is wondering if the morphea can interfere with this.    Labs Reviewed:  N/A    Physical Exam:  Vitals: LMP  (LMP Unknown)   SKIN: Focused examination of Left breast was performed.  - On the left breast there is a large hyperpigmented rough scarred plaque that affects the left nipple. More predominant along the medial aspect of the left nipple, extending outwards, with progression to the supero-lateral aspect as well.  - No other lesions of concern on areas examined.     Medications:  Current Outpatient Medications   Medication Sig Dispense Refill    acetaminophen (TYLENOL) 325 MG tablet Take 650 mg by mouth as needed for mild pain      albuterol (PROAIR HFA;PROVENTIL  HFA;VENTOLIN HFA) 90 mcg/actuation inhaler [ALBUTEROL (PROAIR HFA;PROVENTIL HFA;VENTOLIN HFA) 90 MCG/ACTUATION INHALER] Inhale 2 puffs every 4 (four) hours as needed for wheezing.      Cholecalciferol (VITAMIN D3 PO) Take 1 capsule by mouth daily      cyanocobalamin (VITAMIN B-12) 1000 MCG tablet Take 1,000 mcg by mouth daily      fexofenadine (ALLEGRA) 180 MG tablet Take 180 mg by mouth as needed      ibuprofen (ADVIL/MOTRIN) 200 MG tablet Take 400 mg by mouth as needed for pain      montelukast (SINGULAIR) 10 MG tablet Take 1 tablet by mouth daily      multivitamin therapeutic (THERAGRAN) tablet Take 1 tablet by mouth every evening      omeprazole (PRILOSEC) 20 MG DR capsule Take 20 mg by mouth daily      prochlorperazine (COMPAZINE) 5 MG tablet Take 5 mg by mouth 3 times daily as needed (Patient not taking: Reported on 10/4/2024)      Triamcinolone Acetonide (NASACORT ALLERGY 24HR NA) Spray 1-2 sprays in nostril as needed.       No current facility-administered medications for this visit.      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)    Vomiting    Acute febrile illness    Lactic acidosis    Class 1 obesity in adult    Vomiting without nausea, intractability of vomiting not specified, unspecified vomiting type    Sepsis, due to unspecified organism, unspecified whether acute organ dysfunction present (H)     Past Medical History:   Diagnosis Date    Asthma     Asthma     Breast cancer (H) 2014    left    Breast cancer (H) 2017    right    Breast cancer (H) 2014    left    Breast cancer (H) 2017    right    Bronchitis, chronic (H)     Bronchitis, chronic (H)     Colon polyp 2009    one precancerous polyp    Colon polyp 2009    one precancerous polyp    Cytoxan-induced pulmonary  interstitial disease (H)     Cytoxan-induced pulmonary interstitial disease (H)     Disease of thyroid gland     Disease of thyroid gland     GERD (gastroesophageal reflux disease)     GERD (gastroesophageal reflux disease)     Hx antineoplastic chemotherapy 2014    Hx antineoplastic chemotherapy 2014    Hx of radiation therapy 2014    Hx of radiation therapy 2014    Peripheral neuropathy     hands and feet    Peripheral neuropathy     hands and feet    Pneumonia     Pneumonia     Sinusitis     Sinusitis     Uterine polyp     Uterine polyp        MARIANELA Quinn DO  [address] [address] on close of this encounter.

## 2024-11-26 NOTE — NURSING NOTE
Dermatology Rooming Note    Willa Hernandez's goals for this visit include:   Chief Complaint   Patient presents with    Autoimmune Disease     Morphea on left breast that began last year      Joseph WONG CMA

## 2024-11-26 NOTE — LETTER
11/26/2024       RE: Willa Hernandez  [address] [facility] [address]     Dear Colleague,    Thank you for referring your patient, Willa Hernandez, to the [facility] DERMATOLOGY CLINIC [place] at . Please see a copy of my visit note below.    I have personally examined this patient and agree with the resident doctor's documentation and plan of care. I have reviewed and amended the resident's note. The documentation accurately reflects my clinical observations, diagnoses, treatment and follow-up plans.     Christo Banda MD  Dermatology Staff      [facility] Dermatology Note  Encounter Date: Nov 26, 2024  Office Visit     Dermatology Problem List:  1. Radiation-induced morphea  - Current tx: Methotrexate 25 mg Qweek, Folate, Tacrolimus ointment  2. Breast Cancer (Triple negative,  Left breast 7/2014, Right breast 7/2017)  - Taxotere/Cytoxan (10/2014), adjuvant radiation 2/2015  - Adriamycin/Cytoxan (8/2017), Radiation 5/2018    ____________________________________________    Assessment & Plan:     # Radiation-Induced Morphea (Continuing focal point for medical care services related to serious/complex condition)  (Severe condition with threat of bodily harm, therapy requiring intensive monitoring)  Patient presenting with radiation-induced morphea ~9 years after initial radiation course to the left breast, with skin biopsy confirming this diagnosis.  Discussed etiology and treatment options with the patient.  Patient notes that morphea overall does not bother her.  However, she may consider total mastectomy for breast cancer prevention, and was wondering if the morphea can interfere with this.  Given this, patient would benefit from treatments that can limit progression, and potentially offer some improvement.  Given this, we recommend starting with methotrexate.  Counseled on benefits and side effects, including  immune modulating effects, liver changes, blood count changes.   - Start methotrexate 25mg/week +folic acid 1mg/d on non methotrexate days   - CBC, CMP with 2 weeks interval spacing out to every 3 mo   - start protopic application  -Not bothered by disease, but limiting progression of the sclerosis may preserve her ability to have a mastectomy to reduce her chances of recurrence      Procedures Performed:   None    Follow-up: 4 month(s) in-person, or earlier for new or changing lesions    Staff and Resident:     Kranthi Almaraz MD  Internal Medicine - Dermatology (PGY-4)   ____________________________________________    CC: Autoimmune Disease (Morphea on left breast that began last year )    HPI:  Ms. Willa Hernandez is a(n) 69 year old female with PMH triple-negative breast cancer (bilateral, diagnosed at different time points). She has had chemoradiation 2014 for left breast cancer, and chemoradiation in 2017/2018 for right breast cancer.  Following treatment, patient has been managing chronic lymphedema and drainage.  Patient had swelling and fever that started in 1/2024.  The initial fever and swelling subsided, but during the summer time, she noted a color change with blue/black discoloration and swelling, followed by  development of rough dark plaques on the left breast.  Initially this was confined to the medial aspect of the left breast, but now has progressed more superiorly and laterally as well.  Overall, the patient notes that the rash does not necessarily bother her.  However, she is potentially interested in total mastectomy in the future, and is wondering if the morphea can interfere with this.    Labs Reviewed:  N/A    Physical Exam:  Vitals: LMP  (LMP Unknown)   SKIN: Focused examination of Left breast was performed.  - On the left breast there is a large hyperpigmented rough scarred plaque that affects the left nipple. More predominant along the medial aspect of the left nipple,  extending outwards, with progression to the supero-lateral aspect as well.  - No other lesions of concern on areas examined.     Medications:  Current Outpatient Medications   Medication Sig Dispense Refill     acetaminophen (TYLENOL) 325 MG tablet Take 650 mg by mouth as needed for mild pain       albuterol (PROAIR HFA;PROVENTIL HFA;VENTOLIN HFA) 90 mcg/actuation inhaler [ALBUTEROL (PROAIR HFA;PROVENTIL HFA;VENTOLIN HFA) 90 MCG/ACTUATION INHALER] Inhale 2 puffs every 4 (four) hours as needed for wheezing.       Cholecalciferol (VITAMIN D3 PO) Take 1 capsule by mouth daily       cyanocobalamin (VITAMIN B-12) 1000 MCG tablet Take 1,000 mcg by mouth daily       fexofenadine (ALLEGRA) 180 MG tablet Take 180 mg by mouth as needed       ibuprofen (ADVIL/MOTRIN) 200 MG tablet Take 400 mg by mouth as needed for pain       montelukast (SINGULAIR) 10 MG tablet Take 1 tablet by mouth daily       multivitamin therapeutic (THERAGRAN) tablet Take 1 tablet by mouth every evening       omeprazole (PRILOSEC) 20 MG DR capsule Take 20 mg by mouth daily       prochlorperazine (COMPAZINE) 5 MG tablet Take 5 mg by mouth 3 times daily as needed (Patient not taking: Reported on 10/4/2024)       Triamcinolone Acetonide (NASACORT ALLERGY 24HR NA) Spray 1-2 sprays in nostril as needed.       No current facility-administered medications for this visit.      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)     Vomiting     Acute febrile illness     Lactic acidosis     Class 1 obesity in adult     Vomiting without nausea, intractability of vomiting not specified, unspecified vomiting type     Sepsis, due to unspecified organism, unspecified whether acute organ dysfunction present (H)     Past Medical History:   Diagnosis  Date     Asthma      Asthma      Breast cancer (H) 2014    left     Breast cancer (H) 2017    right     Breast cancer (H) 2014    left     Breast cancer (H) 2017    right     Bronchitis, chronic (H)      Bronchitis, chronic (H)      Colon polyp 2009    one precancerous polyp     Colon polyp 2009    one precancerous polyp     Cytoxan-induced pulmonary interstitial disease (H)      Cytoxan-induced pulmonary interstitial disease (H)      Disease of thyroid gland      Disease of thyroid gland      GERD (gastroesophageal reflux disease)      GERD (gastroesophageal reflux disease)      Hx antineoplastic chemotherapy 2014     Hx antineoplastic chemotherapy 2014     Hx of radiation therapy 2014     Hx of radiation therapy 2014     Peripheral neuropathy     hands and feet     Peripheral neuropathy     hands and feet     Pneumonia      Pneumonia      Sinusitis      Sinusitis      Uterine polyp      Uterine polyp        CC Parvin Quinn DO  [address] [place] [address] on close of this encounter.      Again, thank you for allowing me to participate in the care of your patient.      Sincerely,    Christo Banda MD

## 2024-12-09 ENCOUNTER — LAB REQUISITION (OUTPATIENT)
Dept: LAB | Facility: CLINIC | Age: 69
End: 2024-12-09

## 2024-12-09 DIAGNOSIS — E78.2 MIXED HYPERLIPIDEMIA: ICD-10-CM

## 2024-12-09 LAB
CHOLEST SERPL-MCNC: 173 MG/DL
FASTING STATUS PATIENT QL REPORTED: NORMAL
HDLC SERPL-MCNC: 57 MG/DL
LDLC SERPL CALC-MCNC: 92 MG/DL
NONHDLC SERPL-MCNC: 116 MG/DL
TRIGL SERPL-MCNC: 118 MG/DL

## 2024-12-09 PROCEDURE — 80061 LIPID PANEL: CPT | Performed by: FAMILY MEDICINE

## 2024-12-09 PROCEDURE — 82465 ASSAY BLD/SERUM CHOLESTEROL: CPT | Performed by: FAMILY MEDICINE

## 2024-12-17 ENCOUNTER — LAB (OUTPATIENT)
Dept: LAB | Facility: CLINIC | Age: 69
End: 2024-12-17
Payer: COMMERCIAL

## 2024-12-17 DIAGNOSIS — L94.0 MORPHEA: ICD-10-CM

## 2024-12-17 DIAGNOSIS — M34.9 SCLERODERMA (H): ICD-10-CM

## 2024-12-17 LAB
ALBUMIN SERPL BCG-MCNC: 4 G/DL (ref 3.5–5.2)
ALP SERPL-CCNC: 113 U/L (ref 40–150)
ALT SERPL W P-5'-P-CCNC: 11 U/L (ref 0–50)
ANION GAP SERPL CALCULATED.3IONS-SCNC: 13 MMOL/L (ref 7–15)
AST SERPL W P-5'-P-CCNC: 29 U/L (ref 0–45)
BASOPHILS # BLD AUTO: 0 10E3/UL (ref 0–0.2)
BASOPHILS NFR BLD AUTO: 0 %
BILIRUB SERPL-MCNC: 0.3 MG/DL
BUN SERPL-MCNC: 20.4 MG/DL (ref 8–23)
CALCIUM SERPL-MCNC: 9.2 MG/DL (ref 8.8–10.4)
CHLORIDE SERPL-SCNC: 99 MMOL/L (ref 98–107)
CREAT SERPL-MCNC: 0.89 MG/DL (ref 0.51–0.95)
EGFRCR SERPLBLD CKD-EPI 2021: 70 ML/MIN/1.73M2
EOSINOPHIL # BLD AUTO: 0.2 10E3/UL (ref 0–0.7)
EOSINOPHIL NFR BLD AUTO: 2 %
ERYTHROCYTE [DISTWIDTH] IN BLOOD BY AUTOMATED COUNT: 15 % (ref 10–15)
GLUCOSE SERPL-MCNC: 87 MG/DL (ref 70–99)
HCO3 SERPL-SCNC: 26 MMOL/L (ref 22–29)
HCT VFR BLD AUTO: 43.2 % (ref 35–47)
HGB BLD-MCNC: 13.2 G/DL (ref 11.7–15.7)
IMM GRANULOCYTES # BLD: 0 10E3/UL
IMM GRANULOCYTES NFR BLD: 0 %
LYMPHOCYTES # BLD AUTO: 1.9 10E3/UL (ref 0.8–5.3)
LYMPHOCYTES NFR BLD AUTO: 21 %
MCH RBC QN AUTO: 27.6 PG (ref 26.5–33)
MCHC RBC AUTO-ENTMCNC: 30.6 G/DL (ref 31.5–36.5)
MCV RBC AUTO: 90 FL (ref 78–100)
MONOCYTES # BLD AUTO: 0.7 10E3/UL (ref 0–1.3)
MONOCYTES NFR BLD AUTO: 7 %
NEUTROPHILS # BLD AUTO: 6.5 10E3/UL (ref 1.6–8.3)
NEUTROPHILS NFR BLD AUTO: 70 %
PLATELET # BLD AUTO: 266 10E3/UL (ref 150–450)
POTASSIUM SERPL-SCNC: 4 MMOL/L (ref 3.4–5.3)
PROT SERPL-MCNC: 7.4 G/DL (ref 6.4–8.3)
RBC # BLD AUTO: 4.78 10E6/UL (ref 3.8–5.2)
SODIUM SERPL-SCNC: 138 MMOL/L (ref 135–145)
WBC # BLD AUTO: 9.2 10E3/UL (ref 4–11)

## 2024-12-17 PROCEDURE — 36415 COLL VENOUS BLD VENIPUNCTURE: CPT | Performed by: FAMILY MEDICINE

## 2024-12-17 PROCEDURE — 85025 COMPLETE CBC W/AUTO DIFF WBC: CPT | Performed by: FAMILY MEDICINE

## 2024-12-17 PROCEDURE — 82610 CYSTATIN C: CPT | Performed by: FAMILY MEDICINE

## 2024-12-17 PROCEDURE — 80053 COMPREHEN METABOLIC PANEL: CPT | Performed by: FAMILY MEDICINE

## 2024-12-18 LAB
CYSTATIN C (ROCHE): 1.1 MG/L (ref 0.6–1)
GFR/BSA.PRED SERPLBLD CYS-BASED-ARV: 62 ML/MIN/1.73M2

## 2024-12-31 ENCOUNTER — LAB (OUTPATIENT)
Dept: LAB | Facility: CLINIC | Age: 69
End: 2024-12-31
Payer: COMMERCIAL

## 2024-12-31 DIAGNOSIS — L94.0 MORPHEA: ICD-10-CM

## 2024-12-31 DIAGNOSIS — M34.9 SCLERODERMA (H): ICD-10-CM

## 2024-12-31 LAB
ALBUMIN SERPL BCG-MCNC: 3.8 G/DL (ref 3.5–5.2)
ALP SERPL-CCNC: 109 U/L (ref 40–150)
ALT SERPL W P-5'-P-CCNC: 11 U/L (ref 0–50)
ANION GAP SERPL CALCULATED.3IONS-SCNC: 13 MMOL/L (ref 7–15)
AST SERPL W P-5'-P-CCNC: 30 U/L (ref 0–45)
BASOPHILS # BLD AUTO: 0 10E3/UL (ref 0–0.2)
BASOPHILS NFR BLD AUTO: 0 %
BILIRUB SERPL-MCNC: 0.2 MG/DL
BUN SERPL-MCNC: 15.6 MG/DL (ref 8–23)
CALCIUM SERPL-MCNC: 9.1 MG/DL (ref 8.8–10.4)
CHLORIDE SERPL-SCNC: 100 MMOL/L (ref 98–107)
CREAT SERPL-MCNC: 0.88 MG/DL (ref 0.51–0.95)
EGFRCR SERPLBLD CKD-EPI 2021: 71 ML/MIN/1.73M2
EOSINOPHIL # BLD AUTO: 0 10E3/UL (ref 0–0.7)
EOSINOPHIL NFR BLD AUTO: 0 %
ERYTHROCYTE [DISTWIDTH] IN BLOOD BY AUTOMATED COUNT: 15.4 % (ref 10–15)
GLUCOSE SERPL-MCNC: 127 MG/DL (ref 70–99)
HCO3 SERPL-SCNC: 24 MMOL/L (ref 22–29)
HCT VFR BLD AUTO: 42.5 % (ref 35–47)
HGB BLD-MCNC: 13.3 G/DL (ref 11.7–15.7)
IMM GRANULOCYTES # BLD: 0 10E3/UL
IMM GRANULOCYTES NFR BLD: 0 %
LYMPHOCYTES # BLD AUTO: 1.4 10E3/UL (ref 0.8–5.3)
LYMPHOCYTES NFR BLD AUTO: 15 %
MCH RBC QN AUTO: 27.8 PG (ref 26.5–33)
MCHC RBC AUTO-ENTMCNC: 31.3 G/DL (ref 31.5–36.5)
MCV RBC AUTO: 89 FL (ref 78–100)
MONOCYTES # BLD AUTO: 1.1 10E3/UL (ref 0–1.3)
MONOCYTES NFR BLD AUTO: 11 %
NEUTROPHILS # BLD AUTO: 6.9 10E3/UL (ref 1.6–8.3)
NEUTROPHILS NFR BLD AUTO: 73 %
PLATELET # BLD AUTO: 250 10E3/UL (ref 150–450)
POTASSIUM SERPL-SCNC: 3.9 MMOL/L (ref 3.4–5.3)
PROT SERPL-MCNC: 7.2 G/DL (ref 6.4–8.3)
RBC # BLD AUTO: 4.78 10E6/UL (ref 3.8–5.2)
SODIUM SERPL-SCNC: 137 MMOL/L (ref 135–145)
WBC # BLD AUTO: 9.4 10E3/UL (ref 4–11)

## 2024-12-31 PROCEDURE — 36415 COLL VENOUS BLD VENIPUNCTURE: CPT

## 2024-12-31 PROCEDURE — 80053 COMPREHEN METABOLIC PANEL: CPT

## 2024-12-31 PROCEDURE — 85025 COMPLETE CBC W/AUTO DIFF WBC: CPT

## 2025-01-14 ENCOUNTER — LAB (OUTPATIENT)
Dept: LAB | Facility: CLINIC | Age: 70
End: 2025-01-14
Payer: COMMERCIAL

## 2025-01-14 DIAGNOSIS — M34.9 SCLERODERMA (H): ICD-10-CM

## 2025-01-14 DIAGNOSIS — L94.0 MORPHEA: ICD-10-CM

## 2025-01-14 LAB
ALBUMIN SERPL BCG-MCNC: 3.9 G/DL (ref 3.5–5.2)
ALP SERPL-CCNC: 119 U/L (ref 40–150)
ALT SERPL W P-5'-P-CCNC: 11 U/L (ref 0–50)
ANION GAP SERPL CALCULATED.3IONS-SCNC: 10 MMOL/L (ref 7–15)
AST SERPL W P-5'-P-CCNC: 30 U/L (ref 0–45)
BASOPHILS # BLD AUTO: 0 10E3/UL (ref 0–0.2)
BASOPHILS NFR BLD AUTO: 0 %
BILIRUB SERPL-MCNC: 0.3 MG/DL
BUN SERPL-MCNC: 20.8 MG/DL (ref 8–23)
CALCIUM SERPL-MCNC: 9.5 MG/DL (ref 8.8–10.4)
CHLORIDE SERPL-SCNC: 101 MMOL/L (ref 98–107)
CREAT SERPL-MCNC: 0.86 MG/DL (ref 0.51–0.95)
EGFRCR SERPLBLD CKD-EPI 2021: 73 ML/MIN/1.73M2
EOSINOPHIL # BLD AUTO: 0.2 10E3/UL (ref 0–0.7)
EOSINOPHIL NFR BLD AUTO: 2 %
ERYTHROCYTE [DISTWIDTH] IN BLOOD BY AUTOMATED COUNT: 15.2 % (ref 10–15)
GLUCOSE SERPL-MCNC: 109 MG/DL (ref 70–99)
HCO3 SERPL-SCNC: 27 MMOL/L (ref 22–29)
HCT VFR BLD AUTO: 41.1 % (ref 35–47)
HGB BLD-MCNC: 13.3 G/DL (ref 11.7–15.7)
IMM GRANULOCYTES # BLD: 0 10E3/UL
IMM GRANULOCYTES NFR BLD: 0 %
LYMPHOCYTES # BLD AUTO: 1.7 10E3/UL (ref 0.8–5.3)
LYMPHOCYTES NFR BLD AUTO: 20 %
MCH RBC QN AUTO: 28.1 PG (ref 26.5–33)
MCHC RBC AUTO-ENTMCNC: 32.4 G/DL (ref 31.5–36.5)
MCV RBC AUTO: 87 FL (ref 78–100)
MONOCYTES # BLD AUTO: 0.8 10E3/UL (ref 0–1.3)
MONOCYTES NFR BLD AUTO: 9 %
NEUTROPHILS # BLD AUTO: 5.9 10E3/UL (ref 1.6–8.3)
NEUTROPHILS NFR BLD AUTO: 69 %
PLATELET # BLD AUTO: 272 10E3/UL (ref 150–450)
POTASSIUM SERPL-SCNC: 4.1 MMOL/L (ref 3.4–5.3)
PROT SERPL-MCNC: 7.5 G/DL (ref 6.4–8.3)
RBC # BLD AUTO: 4.74 10E6/UL (ref 3.8–5.2)
SODIUM SERPL-SCNC: 138 MMOL/L (ref 135–145)
WBC # BLD AUTO: 8.6 10E3/UL (ref 4–11)

## 2025-01-14 PROCEDURE — 80053 COMPREHEN METABOLIC PANEL: CPT | Performed by: FAMILY MEDICINE

## 2025-01-14 PROCEDURE — 85025 COMPLETE CBC W/AUTO DIFF WBC: CPT | Performed by: FAMILY MEDICINE

## 2025-01-14 PROCEDURE — 36415 COLL VENOUS BLD VENIPUNCTURE: CPT | Performed by: FAMILY MEDICINE

## 2025-01-18 ENCOUNTER — HEALTH MAINTENANCE LETTER (OUTPATIENT)
Age: 70
End: 2025-01-18

## 2025-01-28 ENCOUNTER — LAB (OUTPATIENT)
Dept: LAB | Facility: CLINIC | Age: 70
End: 2025-01-28
Attending: STUDENT IN AN ORGANIZED HEALTH CARE EDUCATION/TRAINING PROGRAM
Payer: COMMERCIAL

## 2025-01-28 DIAGNOSIS — M34.9 SCLERODERMA (H): ICD-10-CM

## 2025-01-28 DIAGNOSIS — L94.0 MORPHEA: ICD-10-CM

## 2025-01-28 LAB
ALBUMIN SERPL BCG-MCNC: 4 G/DL (ref 3.5–5.2)
ALP SERPL-CCNC: 120 U/L (ref 40–150)
ALT SERPL W P-5'-P-CCNC: 12 U/L (ref 0–50)
ANION GAP SERPL CALCULATED.3IONS-SCNC: 13 MMOL/L (ref 7–15)
AST SERPL W P-5'-P-CCNC: 37 U/L (ref 0–45)
BASOPHILS # BLD AUTO: 0 10E3/UL (ref 0–0.2)
BASOPHILS NFR BLD AUTO: 0 %
BILIRUB SERPL-MCNC: 0.2 MG/DL
BUN SERPL-MCNC: 20.2 MG/DL (ref 8–23)
CALCIUM SERPL-MCNC: 9.7 MG/DL (ref 8.8–10.4)
CHLORIDE SERPL-SCNC: 101 MMOL/L (ref 98–107)
CREAT SERPL-MCNC: 0.87 MG/DL (ref 0.51–0.95)
EGFRCR SERPLBLD CKD-EPI 2021: 72 ML/MIN/1.73M2
EOSINOPHIL # BLD AUTO: 0.3 10E3/UL (ref 0–0.7)
EOSINOPHIL NFR BLD AUTO: 3 %
ERYTHROCYTE [DISTWIDTH] IN BLOOD BY AUTOMATED COUNT: 16.3 % (ref 10–15)
GLUCOSE SERPL-MCNC: 90 MG/DL (ref 70–99)
HCO3 SERPL-SCNC: 26 MMOL/L (ref 22–29)
HCT VFR BLD AUTO: 41.7 % (ref 35–47)
HGB BLD-MCNC: 13.3 G/DL (ref 11.7–15.7)
IMM GRANULOCYTES # BLD: 0 10E3/UL
IMM GRANULOCYTES NFR BLD: 0 %
LYMPHOCYTES # BLD AUTO: 1.9 10E3/UL (ref 0.8–5.3)
LYMPHOCYTES NFR BLD AUTO: 18 %
MCH RBC QN AUTO: 28.1 PG (ref 26.5–33)
MCHC RBC AUTO-ENTMCNC: 31.9 G/DL (ref 31.5–36.5)
MCV RBC AUTO: 88 FL (ref 78–100)
MONOCYTES # BLD AUTO: 1 10E3/UL (ref 0–1.3)
MONOCYTES NFR BLD AUTO: 9 %
NEUTROPHILS # BLD AUTO: 7.3 10E3/UL (ref 1.6–8.3)
NEUTROPHILS NFR BLD AUTO: 69 %
PLATELET # BLD AUTO: 268 10E3/UL (ref 150–450)
POTASSIUM SERPL-SCNC: 5 MMOL/L (ref 3.4–5.3)
PROT SERPL-MCNC: 7.3 G/DL (ref 6.4–8.3)
RBC # BLD AUTO: 4.73 10E6/UL (ref 3.8–5.2)
SODIUM SERPL-SCNC: 140 MMOL/L (ref 135–145)
WBC # BLD AUTO: 10.5 10E3/UL (ref 4–11)

## 2025-01-28 PROCEDURE — 80053 COMPREHEN METABOLIC PANEL: CPT

## 2025-01-28 PROCEDURE — 85025 COMPLETE CBC W/AUTO DIFF WBC: CPT

## 2025-01-28 PROCEDURE — 36415 COLL VENOUS BLD VENIPUNCTURE: CPT

## 2025-02-11 LAB
ALBUMIN SERPL BCG-MCNC: 3.9 G/DL (ref 3.5–5.2)
ALP SERPL-CCNC: 117 U/L (ref 40–150)
ALT SERPL W P-5'-P-CCNC: 13 U/L (ref 0–50)
ANION GAP SERPL CALCULATED.3IONS-SCNC: 12 MMOL/L (ref 7–15)
AST SERPL W P-5'-P-CCNC: 30 U/L (ref 0–45)
BASOPHILS # BLD AUTO: 0 10E3/UL (ref 0–0.2)
BASOPHILS NFR BLD AUTO: 0 %
BILIRUB SERPL-MCNC: 0.2 MG/DL
BUN SERPL-MCNC: 19.2 MG/DL (ref 8–23)
CALCIUM SERPL-MCNC: 9.4 MG/DL (ref 8.8–10.4)
CHLORIDE SERPL-SCNC: 103 MMOL/L (ref 98–107)
CREAT SERPL-MCNC: 0.93 MG/DL (ref 0.51–0.95)
EGFRCR SERPLBLD CKD-EPI 2021: 66 ML/MIN/1.73M2
EOSINOPHIL # BLD AUTO: 0.2 10E3/UL (ref 0–0.7)
EOSINOPHIL NFR BLD AUTO: 3 %
ERYTHROCYTE [DISTWIDTH] IN BLOOD BY AUTOMATED COUNT: 16.3 % (ref 10–15)
GLUCOSE SERPL-MCNC: 95 MG/DL (ref 70–99)
HCO3 SERPL-SCNC: 27 MMOL/L (ref 22–29)
HCT VFR BLD AUTO: 40.9 % (ref 35–47)
HGB BLD-MCNC: 13.1 G/DL (ref 11.7–15.7)
IMM GRANULOCYTES # BLD: 0 10E3/UL
IMM GRANULOCYTES NFR BLD: 0 %
LYMPHOCYTES # BLD AUTO: 1.4 10E3/UL (ref 0.8–5.3)
LYMPHOCYTES NFR BLD AUTO: 15 %
MCH RBC QN AUTO: 28.1 PG (ref 26.5–33)
MCHC RBC AUTO-ENTMCNC: 32 G/DL (ref 31.5–36.5)
MCV RBC AUTO: 88 FL (ref 78–100)
MONOCYTES # BLD AUTO: 0.8 10E3/UL (ref 0–1.3)
MONOCYTES NFR BLD AUTO: 9 %
NEUTROPHILS # BLD AUTO: 6.6 10E3/UL (ref 1.6–8.3)
NEUTROPHILS NFR BLD AUTO: 73 %
PLATELET # BLD AUTO: 298 10E3/UL (ref 150–450)
POTASSIUM SERPL-SCNC: 4.7 MMOL/L (ref 3.4–5.3)
PROT SERPL-MCNC: 7.1 G/DL (ref 6.4–8.3)
RBC # BLD AUTO: 4.66 10E6/UL (ref 3.8–5.2)
SODIUM SERPL-SCNC: 142 MMOL/L (ref 135–145)
WBC # BLD AUTO: 9.1 10E3/UL (ref 4–11)

## 2025-02-25 ENCOUNTER — LAB (OUTPATIENT)
Dept: LAB | Facility: CLINIC | Age: 70
End: 2025-02-25
Payer: COMMERCIAL

## 2025-02-25 DIAGNOSIS — L94.0 MORPHEA: ICD-10-CM

## 2025-02-25 DIAGNOSIS — M34.9 SCLERODERMA (H): ICD-10-CM

## 2025-02-25 LAB
ALBUMIN SERPL BCG-MCNC: 3.9 G/DL (ref 3.5–5.2)
ALP SERPL-CCNC: 116 U/L (ref 40–150)
ALT SERPL W P-5'-P-CCNC: 10 U/L (ref 0–50)
ANION GAP SERPL CALCULATED.3IONS-SCNC: 10 MMOL/L (ref 7–15)
AST SERPL W P-5'-P-CCNC: 27 U/L (ref 0–45)
BASOPHILS # BLD AUTO: 0 10E3/UL (ref 0–0.2)
BASOPHILS NFR BLD AUTO: 0 %
BILIRUB SERPL-MCNC: 0.3 MG/DL
BUN SERPL-MCNC: 16.9 MG/DL (ref 8–23)
CALCIUM SERPL-MCNC: 9.3 MG/DL (ref 8.8–10.4)
CHLORIDE SERPL-SCNC: 102 MMOL/L (ref 98–107)
CREAT SERPL-MCNC: 0.81 MG/DL (ref 0.51–0.95)
EGFRCR SERPLBLD CKD-EPI 2021: 78 ML/MIN/1.73M2
EOSINOPHIL # BLD AUTO: 0.3 10E3/UL (ref 0–0.7)
EOSINOPHIL NFR BLD AUTO: 3 %
ERYTHROCYTE [DISTWIDTH] IN BLOOD BY AUTOMATED COUNT: 16.6 % (ref 10–15)
GLUCOSE SERPL-MCNC: 122 MG/DL (ref 70–99)
HCO3 SERPL-SCNC: 28 MMOL/L (ref 22–29)
HCT VFR BLD AUTO: 42.6 % (ref 35–47)
HGB BLD-MCNC: 13.4 G/DL (ref 11.7–15.7)
IMM GRANULOCYTES # BLD: 0 10E3/UL
IMM GRANULOCYTES NFR BLD: 0 %
LYMPHOCYTES # BLD AUTO: 1.7 10E3/UL (ref 0.8–5.3)
LYMPHOCYTES NFR BLD AUTO: 21 %
MCH RBC QN AUTO: 27.9 PG (ref 26.5–33)
MCHC RBC AUTO-ENTMCNC: 31.5 G/DL (ref 31.5–36.5)
MCV RBC AUTO: 89 FL (ref 78–100)
MONOCYTES # BLD AUTO: 0.8 10E3/UL (ref 0–1.3)
MONOCYTES NFR BLD AUTO: 10 %
NEUTROPHILS # BLD AUTO: 5.3 10E3/UL (ref 1.6–8.3)
NEUTROPHILS NFR BLD AUTO: 66 %
PLATELET # BLD AUTO: 256 10E3/UL (ref 150–450)
POTASSIUM SERPL-SCNC: 4.6 MMOL/L (ref 3.4–5.3)
PROT SERPL-MCNC: 7.6 G/DL (ref 6.4–8.3)
RBC # BLD AUTO: 4.8 10E6/UL (ref 3.8–5.2)
SODIUM SERPL-SCNC: 140 MMOL/L (ref 135–145)
WBC # BLD AUTO: 8.1 10E3/UL (ref 4–11)

## 2025-02-25 PROCEDURE — 36415 COLL VENOUS BLD VENIPUNCTURE: CPT

## 2025-02-25 PROCEDURE — 85025 COMPLETE CBC W/AUTO DIFF WBC: CPT

## 2025-02-25 PROCEDURE — 80053 COMPREHEN METABOLIC PANEL: CPT

## 2025-03-11 ENCOUNTER — LAB (OUTPATIENT)
Dept: LAB | Facility: CLINIC | Age: 70
End: 2025-03-11
Payer: COMMERCIAL

## 2025-03-11 DIAGNOSIS — M34.9 SCLERODERMA (H): ICD-10-CM

## 2025-03-11 DIAGNOSIS — L94.0 MORPHEA: ICD-10-CM

## 2025-03-11 LAB
ALBUMIN SERPL BCG-MCNC: 3.9 G/DL (ref 3.5–5.2)
ALP SERPL-CCNC: 113 U/L (ref 40–150)
ALT SERPL W P-5'-P-CCNC: 11 U/L (ref 0–50)
ANION GAP SERPL CALCULATED.3IONS-SCNC: 11 MMOL/L (ref 7–15)
AST SERPL W P-5'-P-CCNC: 31 U/L (ref 0–45)
BASOPHILS # BLD AUTO: 0 10E3/UL (ref 0–0.2)
BASOPHILS NFR BLD AUTO: 0 %
BILIRUB SERPL-MCNC: 0.3 MG/DL
BUN SERPL-MCNC: 17.1 MG/DL (ref 8–23)
CALCIUM SERPL-MCNC: 9.6 MG/DL (ref 8.8–10.4)
CHLORIDE SERPL-SCNC: 102 MMOL/L (ref 98–107)
CREAT SERPL-MCNC: 0.82 MG/DL (ref 0.51–0.95)
EGFRCR SERPLBLD CKD-EPI 2021: 77 ML/MIN/1.73M2
EOSINOPHIL # BLD AUTO: 0.3 10E3/UL (ref 0–0.7)
EOSINOPHIL NFR BLD AUTO: 3 %
ERYTHROCYTE [DISTWIDTH] IN BLOOD BY AUTOMATED COUNT: 17 % (ref 10–15)
GLUCOSE SERPL-MCNC: 108 MG/DL (ref 70–99)
HCO3 SERPL-SCNC: 27 MMOL/L (ref 22–29)
HCT VFR BLD AUTO: 42.8 % (ref 35–47)
HGB BLD-MCNC: 13.5 G/DL (ref 11.7–15.7)
IMM GRANULOCYTES # BLD: 0 10E3/UL
IMM GRANULOCYTES NFR BLD: 0 %
LYMPHOCYTES # BLD AUTO: 1.6 10E3/UL (ref 0.8–5.3)
LYMPHOCYTES NFR BLD AUTO: 17 %
MCH RBC QN AUTO: 27.8 PG (ref 26.5–33)
MCHC RBC AUTO-ENTMCNC: 31.5 G/DL (ref 31.5–36.5)
MCV RBC AUTO: 88 FL (ref 78–100)
MONOCYTES # BLD AUTO: 1.1 10E3/UL (ref 0–1.3)
MONOCYTES NFR BLD AUTO: 12 %
NEUTROPHILS # BLD AUTO: 6.3 10E3/UL (ref 1.6–8.3)
NEUTROPHILS NFR BLD AUTO: 67 %
PLATELET # BLD AUTO: 260 10E3/UL (ref 150–450)
POTASSIUM SERPL-SCNC: 4.7 MMOL/L (ref 3.4–5.3)
PROT SERPL-MCNC: 7.4 G/DL (ref 6.4–8.3)
RBC # BLD AUTO: 4.85 10E6/UL (ref 3.8–5.2)
SODIUM SERPL-SCNC: 140 MMOL/L (ref 135–145)
WBC # BLD AUTO: 9.4 10E3/UL (ref 4–11)

## 2025-03-11 PROCEDURE — 80053 COMPREHEN METABOLIC PANEL: CPT

## 2025-03-11 PROCEDURE — 85025 COMPLETE CBC W/AUTO DIFF WBC: CPT

## 2025-03-11 PROCEDURE — 36415 COLL VENOUS BLD VENIPUNCTURE: CPT

## 2025-03-20 ENCOUNTER — MYC MEDICAL ADVICE (OUTPATIENT)
Dept: DERMATOLOGY | Facility: CLINIC | Age: 70
End: 2025-03-20
Payer: COMMERCIAL

## 2025-03-25 ENCOUNTER — LAB (OUTPATIENT)
Dept: LAB | Facility: CLINIC | Age: 70
End: 2025-03-25
Payer: COMMERCIAL

## 2025-03-25 DIAGNOSIS — M34.9 SCLERODERMA (H): ICD-10-CM

## 2025-03-25 DIAGNOSIS — L94.0 MORPHEA: ICD-10-CM

## 2025-03-25 LAB
ALBUMIN SERPL BCG-MCNC: 3.7 G/DL (ref 3.5–5.2)
ALP SERPL-CCNC: 106 U/L (ref 40–150)
ALT SERPL W P-5'-P-CCNC: 13 U/L (ref 0–50)
ANION GAP SERPL CALCULATED.3IONS-SCNC: 14 MMOL/L (ref 7–15)
AST SERPL W P-5'-P-CCNC: 31 U/L (ref 0–45)
BASOPHILS # BLD AUTO: 0 10E3/UL (ref 0–0.2)
BASOPHILS NFR BLD AUTO: 0 %
BILIRUB SERPL-MCNC: 0.2 MG/DL
BUN SERPL-MCNC: 13.7 MG/DL (ref 8–23)
CALCIUM SERPL-MCNC: 9.2 MG/DL (ref 8.8–10.4)
CHLORIDE SERPL-SCNC: 100 MMOL/L (ref 98–107)
CREAT SERPL-MCNC: 0.89 MG/DL (ref 0.51–0.95)
EGFRCR SERPLBLD CKD-EPI 2021: 70 ML/MIN/1.73M2
EOSINOPHIL # BLD AUTO: 0.3 10E3/UL (ref 0–0.7)
EOSINOPHIL NFR BLD AUTO: 5 %
ERYTHROCYTE [DISTWIDTH] IN BLOOD BY AUTOMATED COUNT: 17 % (ref 10–15)
GLUCOSE SERPL-MCNC: 146 MG/DL (ref 70–99)
HCO3 SERPL-SCNC: 25 MMOL/L (ref 22–29)
HCT VFR BLD AUTO: 41.1 % (ref 35–47)
HGB BLD-MCNC: 12.9 G/DL (ref 11.7–15.7)
IMM GRANULOCYTES # BLD: 0 10E3/UL
IMM GRANULOCYTES NFR BLD: 0 %
LYMPHOCYTES # BLD AUTO: 1.1 10E3/UL (ref 0.8–5.3)
LYMPHOCYTES NFR BLD AUTO: 16 %
MCH RBC QN AUTO: 27.7 PG (ref 26.5–33)
MCHC RBC AUTO-ENTMCNC: 31.4 G/DL (ref 31.5–36.5)
MCV RBC AUTO: 88 FL (ref 78–100)
MONOCYTES # BLD AUTO: 0.9 10E3/UL (ref 0–1.3)
MONOCYTES NFR BLD AUTO: 12 %
NEUTROPHILS # BLD AUTO: 4.8 10E3/UL (ref 1.6–8.3)
NEUTROPHILS NFR BLD AUTO: 67 %
PLATELET # BLD AUTO: 256 10E3/UL (ref 150–450)
POTASSIUM SERPL-SCNC: 4.5 MMOL/L (ref 3.4–5.3)
PROT SERPL-MCNC: 7.2 G/DL (ref 6.4–8.3)
RBC # BLD AUTO: 4.65 10E6/UL (ref 3.8–5.2)
SODIUM SERPL-SCNC: 139 MMOL/L (ref 135–145)
WBC # BLD AUTO: 7.2 10E3/UL (ref 4–11)

## 2025-03-25 PROCEDURE — 85025 COMPLETE CBC W/AUTO DIFF WBC: CPT

## 2025-03-25 PROCEDURE — 80053 COMPREHEN METABOLIC PANEL: CPT

## 2025-03-25 PROCEDURE — 36415 COLL VENOUS BLD VENIPUNCTURE: CPT

## 2025-03-27 ENCOUNTER — OFFICE VISIT (OUTPATIENT)
Dept: DERMATOLOGY | Facility: CLINIC | Age: 70
End: 2025-03-27
Attending: STUDENT IN AN ORGANIZED HEALTH CARE EDUCATION/TRAINING PROGRAM
Payer: COMMERCIAL

## 2025-03-27 DIAGNOSIS — Z92.3 HISTORY OF RADIATION EXPOSURE: ICD-10-CM

## 2025-03-27 DIAGNOSIS — Z51.81 THERAPEUTIC DRUG MONITORING: ICD-10-CM

## 2025-03-27 DIAGNOSIS — L94.0 MORPHEA: Primary | ICD-10-CM

## 2025-03-27 NOTE — LETTER
3/27/2025       RE: Willa Hernandez  [address] [facility] [address]     Dear Colleague,    Thank you for referring your patient, Willa Hernandez, to the [facility] DERMATOLOGY CLINIC [place] at [facility]. Please see a copy of my visit note below.    [facility] Dermatology Note    Encounter Date: Mar 27, 2025    Dermatology Problem List:  1. Radiation-induced morphea  - Current tx: Methotrexate 25 mg Qweek, Folate, Tacrolimus ointment  - 11/26/24 start methotrexate  - 03/27/25 stable, less pain, continue 3 mo revisit discontinuation at that time   2. Breast Cancer (Triple negative,  Left breast 7/2014, Right breast 7/2017)  - Taxotere/Cytoxan (10/2014), adjuvant radiation 2/2015  - Adriamycin/Cytoxan (8/2017), Radiation 5/2018    Major PMHx  -   ______________________________________    Impression/Plan:  Willa was seen today for autoimmune disease.    Diagnoses and all orders for this visit:    Morphea  History of radiation exposure  -Condition stabilized with no worsening, possibly slightly less firm although not obviously so  That she has noticed a obvious improvement in the mild to moderate pain she experienced in the area used to get it prior to methotrexate  - She is tolerating the methotrexate well, we reviewed that the postnasal drip and reflux symptoms she is having are likely due to methotrexate, she is very cautious about lung symptoms given the adverse effect she had with Cytoxan  If she still considering bilateral mastectomy wonders whether the radiation-induced morphea would be resolved with this  - Recommend continue methotrexate for 3 more months at this time we can review the need to continue the methotrexate or not  - Switch to getting labs every 3 months rather than every 2 weeks given her lab stability  03/27/25 11/26/25      Therapeutic drug monitoring  - CBC, CMP q3 mo  Other orders  -      Adult Dermatology Clinic Follow-Up Order (Blank)      The longitudinal plan of care for the diagnosis(es)/condition(s) as documented were addressed during this visit. Due to the added complexity in care, I will continue to support Willa in the subsequent management and with ongoing continuity of care for radiation induced morphea.      Follow-up in 3 mo.       Staff Involved:  Staff Only    Christo Banda MD   of Dermatology  Department of Dermatology  [facility] [employer] [facility]      CC:   Chief Complaint   Patient presents with     Autoimmune Disease     4 month Radiation-induced morphea recheck- patient states that condition is stable/slightly improved        History of Present Illness:  Ms. Willa Hernandez is a 69 year old female who presents as a return patient.    Val was last seen by me on November 26 for her radiation-induced morphea.  It been ongoing for 9 years prior to her visit with me.  She relayed that it does not bother her but that she was considering total mastectomy for breast cancer prevention and was wondering if the morphea could interfere with this.  After discussion we opted to start methotrexate.  We started at 25 mg weekly with folic acid and Protopic application.  She sent a message on March 11 letting me know that she was having stronger acid reflux symptoms and postnasal drainage.  She has also been experiencing cough.  She was wondering whether this could be due to methotrexate toxicity given that she experienced lung issues with Cytoxan.  I reassured her that the symptoms did not sound consistent with methotrexate pneumonitis and offered to order a chest x-ray which she declined.  She sent a message on March 20 with concern about concomitant use of beta-lactam's and methotrexate.  She was advised by a family member pharmacist that the tube could interact.  We discussed that the mechanism of interaction is protein displacement of  methotrexate but the apps actual real-world effect on the levels of methotrexate is minute and not clinically relevant.    Tolerating methotrexate. Feels it is a little softer, no progression    Labs:      Physical exam:  Vitals: LMP  (LMP Unknown)   GEN: well developed, well-nourished, in no acute distress, in a pleasant mood.     SKIN: Cherry phototype 1  - Focused examination of the chest was performed.  - dermal sclerosis in graeme areolar area L breast, stable size   - No other lesions of concern on areas examined.     Past Medical History:   Past Medical History:   Diagnosis Date     Asthma      Asthma      Breast cancer (H) 2014    left     Breast cancer (H) 2017    right     Breast cancer (H) 2014    left     Breast cancer (H) 2017    right     Bronchitis, chronic (H)      Bronchitis, chronic (H)      Colon polyp 2009    one precancerous polyp     Colon polyp 2009    one precancerous polyp     Cytoxan-induced pulmonary interstitial disease (H)      Cytoxan-induced pulmonary interstitial disease (H)      Disease of thyroid gland      Disease of thyroid gland      GERD (gastroesophageal reflux disease)      GERD (gastroesophageal reflux disease)      Hx antineoplastic chemotherapy 2014     Hx antineoplastic chemotherapy 2014     Hx of radiation therapy 2014     Hx of radiation therapy 2014     Peripheral neuropathy     hands and feet     Peripheral neuropathy     hands and feet     Pneumonia      Pneumonia      Sinusitis      Sinusitis      Uterine polyp      Uterine polyp      Past Surgical History:   Procedure Laterality Date     BIOPSY BREAST Right 2/12/2018    Procedure: RIGHT BREAST LUMPECTOMY WIRE LOCALIZATION  RIGHT SENTINEL LYMPH NODE BIOPSY AND PORT REMOVAL;  Surgeon: Veronica Gan MD;  Location: [facility];  Service:      BIOPSY BREAST Left 2014     BREAST EXCISIONAL BIOPSY Left     years ago     COLONOSCOPY       FOOT ARTHRODESIS, MODIFIED KATHY  may 2013 and oct 2013    hard to get  "hardware removed due to allergy to metal      FOOT SURGERY Right 2013     LAPAROSCOPIC HYSTERECTOMY TOTAL Bilateral 11/14/2018    Procedure: TOTAL LAPAROSCOPIC HYSTERECTOMY, BILATERAL SALPINGO-OOPHORECTOMY;  Surgeon: Avni Deng MD;  Location: [facility];  Service: Gynecology     LUMPECTOMY BREAST Left 2014     LUMPECTOMY BREAST Right 2018     OTHER SURGICAL HISTORY      port a cath     POLYPECTOMY  2009    Uterine polyp removed      [place] RMVL DAREK CTR VAD W/SUBQ PORT/ CTR/PRPH INSJ N/A 2/12/2018    Procedure: PORT REMOVAL;  Surgeon: Veronica Gan MD;  Location: [facility];  Service: General       Social History:   reports that she has never smoked. She has never used smokeless tobacco. She reports current alcohol use. She reports that she does not use drugs.    Family History:  Family History   Problem Relation Age of Onset     Ovarian Cancer Maternal Grandmother 60.00        biopsy showed adenocarcinoma, suspected ovarian primary     Colon Cancer Maternal Grandfather 65.00     Breast Cancer Cousin 50.00        maternal first-cousin, triple-negative     Hypertension Mother      Dementia Mother      Diabetes Father      Hypertension Father      Pneumonia Father      Hypertension Sister      Breast Cancer Sister 69.00     Uterine Cancer Sister 69.00     Diabetes Brother      Hypertension Brother      Hypertension Paternal Grandmother      Cerebrovascular Disease Paternal Grandmother      Stomach Cancer Paternal Grandfather 70.00     Hypertension Brother      Diabetes Brother      Hypertension Sister      Colon Cancer Maternal Aunt 75.00     Prostate Cancer Maternal Uncle      Diabetes Brother      Colon Polyps Brother         starting in his 20s     Hypertension Son      Asthma Son      Thyroid Cancer Cousin 57.00        maternal first-cousin, Hurthle cell     Cancer Other         \"female\" cancers among relatives to [facility] but specifics not known     Skin Cancer Cousin         paternal first-cousin     " Cancer Cousin         paternal first-cousin, unknown type     Uterine Cancer Other 50.00        daughter to paternal first-cousin     Cancer Cousin         paternal first-cousin, head/neck       Medications:  Current Outpatient Medications   Medication Sig Dispense Refill     acetaminophen (TYLENOL) 325 MG tablet Take 650 mg by mouth as needed for mild pain       albuterol (PROAIR HFA;PROVENTIL HFA;VENTOLIN HFA) 90 mcg/actuation inhaler [ALBUTEROL (PROAIR HFA;PROVENTIL HFA;VENTOLIN HFA) 90 MCG/ACTUATION INHALER] Inhale 2 puffs every 4 (four) hours as needed for wheezing.       amoxicillin-clavulanate (AUGMENTIN) 875-125 MG tablet 1 tablet Orally every 12 hrs for 7 days       Cholecalciferol (VITAMIN D3 PO) Take 1 capsule by mouth daily       cyanocobalamin (VITAMIN B-12) 1000 MCG tablet Take 1,000 mcg by mouth daily       fexofenadine (ALLEGRA) 180 MG tablet Take 180 mg by mouth as needed       folic acid (FOLVITE) 1 MG tablet Take 1 tablet (1 mg) by mouth daily. 30 tablet 6     ibuprofen (ADVIL/MOTRIN) 200 MG tablet Take 400 mg by mouth as needed for pain       methotrexate 2.5 MG tablet Take 10 tablets (25 mg) by mouth every 7 days. 40 tablet 6     montelukast (SINGULAIR) 10 MG tablet Take 1 tablet by mouth daily       multivitamin therapeutic (THERAGRAN) tablet Take 1 tablet by mouth every evening       omeprazole (PRILOSEC) 20 MG DR capsule Take 20 mg by mouth daily       tacrolimus (PROTOPIC) 0.1 % external ointment Apply topically 2 times daily. 100 g 6     Triamcinolone Acetonide (NASACORT ALLERGY 24HR NA) Spray 1-2 sprays in nostril as needed.       Allergies   Allergen Reactions     Bees Unknown     Codeine Nausea     Cortisone (Hydrocortisone) [Hydrocortisone] Hives     Injection     Cyclophosphamide Unknown     Latex Other (See Comments)     intolerance     Nystatin Unknown     Other Environmental Allergy Unknown     Seasonal Allergies, Mold      Prochlorperazine Nausea and Vomiting     Pulmicort  [Budesonide] Hives     Nickel Rash     Other Drug Allergy (See Comments) Rash     White Gold     Triamcinolone Rash     Zantac [Ranitidine] Nausea and Vomiting     Zofran (As Hydrochloride) [Ondansetron] Nausea and Vomiting               Again, thank you for allowing me to participate in the care of your patient.      Sincerely,    Christo Banda MD

## 2025-03-27 NOTE — NURSING NOTE
Dermatology Rooming Note    Willa Hernandez's goals for this visit include:   Chief Complaint   Patient presents with    Autoimmune Disease     4 month Radiation-induced morphea recheck- patient states that condition is stable/slightly improved      Joseph WONG CMA - Dermatology

## 2025-03-27 NOTE — PROGRESS NOTES
[facility] Dermatology Note    Encounter Date: Mar 27, 2025    Dermatology Problem List:  1. Radiation-induced morphea  - Current tx: Methotrexate 25 mg Qweek, Folate, Tacrolimus ointment  - 11/26/24 start methotrexate  - 03/27/25 stable, less pain, continue 3 mo revisit discontinuation at that time   2. Breast Cancer (Triple negative,  Left breast 7/2014, Right breast 7/2017)  - Taxotere/Cytoxan (10/2014), adjuvant radiation 2/2015  - Adriamycin/Cytoxan (8/2017), Radiation 5/2018    Major PMHx  -   ______________________________________    Impression/Plan:  Willa was seen today for autoimmune disease.    Diagnoses and all orders for this visit:    Morphea  History of radiation exposure  -Condition stabilized with no worsening, possibly slightly less firm although not obviously so  That she has noticed a obvious improvement in the mild to moderate pain she experienced in the area used to get it prior to methotrexate  - She is tolerating the methotrexate well, we reviewed that the postnasal drip and reflux symptoms she is having are likely due to methotrexate, she is very cautious about lung symptoms given the adverse effect she had with Cytoxan  If she still considering bilateral mastectomy wonders whether the radiation-induced morphea would be resolved with this  - Recommend continue methotrexate for 3 more months at this time we can review the need to continue the methotrexate or not  - Switch to getting labs every 3 months rather than every 2 weeks given her lab stability  03/27/25 11/26/25      Therapeutic drug monitoring  - CBC, CMP q3 mo  Other orders  -     Adult Dermatology Clinic Follow-Up Order (Blank)      The longitudinal plan of care for the diagnosis(es)/condition(s) as documented were addressed during this visit. Due to the added complexity in care, I will continue to support Willa in the subsequent management and with ongoing continuity of care for radiation induced  morphea.      Follow-up in 3 mo.       Staff Involved:  Staff Only    Christo Banda MD   of Dermatology  Department of Dermatology  [facility]      CC:   Chief Complaint   Patient presents with    Autoimmune Disease     4 month Radiation-induced morphea recheck- patient states that condition is stable/slightly improved        History of Present Illness:  Ms. Willa Hernandez is a 69 year old female who presents as a return patient.    Val was last seen by me on November 26 for her radiation-induced morphea.  It been ongoing for 9 years prior to her visit with me.  She relayed that it does not bother her but that she was considering total mastectomy for breast cancer prevention and was wondering if the morphea could interfere with this.  After discussion we opted to start methotrexate.  We started at 25 mg weekly with folic acid and Protopic application.  She sent a message on March 11 letting me know that she was having stronger acid reflux symptoms and postnasal drainage.  She has also been experiencing cough.  She was wondering whether this could be due to methotrexate toxicity given that she experienced lung issues with Cytoxan.  I reassured her that the symptoms did not sound consistent with methotrexate pneumonitis and offered to order a chest x-ray which she declined.  She sent a message on March 20 with concern about concomitant use of beta-lactam's and methotrexate.  She was advised by a family member pharmacist that the tube could interact.  We discussed that the mechanism of interaction is protein displacement of methotrexate but the apps actual real-world effect on the levels of methotrexate is minute and not clinically relevant.    Tolerating methotrexate. Feels it is a little softer, no progression    Labs:      Physical exam:  Vitals: LMP  (LMP Unknown)   GEN: well developed, well-nourished, in no acute distress, in a pleasant mood.      SKIN: Cherry phototype 1  - Focused examination of the chest was performed.  - dermal sclerosis in graeme areolar area L breast, stable size   - No other lesions of concern on areas examined.     Past Medical History:   Past Medical History:   Diagnosis Date    Asthma     Asthma     Breast cancer (H) 2014    left    Breast cancer (H) 2017    right    Breast cancer (H) 2014    left    Breast cancer (H) 2017    right    Bronchitis, chronic (H)     Bronchitis, chronic (H)     Colon polyp 2009    one precancerous polyp    Colon polyp 2009    one precancerous polyp    Cytoxan-induced pulmonary interstitial disease (H)     Cytoxan-induced pulmonary interstitial disease (H)     Disease of thyroid gland     Disease of thyroid gland     GERD (gastroesophageal reflux disease)     GERD (gastroesophageal reflux disease)     Hx antineoplastic chemotherapy 2014    Hx antineoplastic chemotherapy 2014    Hx of radiation therapy 2014    Hx of radiation therapy 2014    Peripheral neuropathy     hands and feet    Peripheral neuropathy     hands and feet    Pneumonia     Pneumonia     Sinusitis     Sinusitis     Uterine polyp     Uterine polyp      Past Surgical History:   Procedure Laterality Date    BIOPSY BREAST Right 2/12/2018    Procedure: RIGHT BREAST LUMPECTOMY WIRE LOCALIZATION  RIGHT SENTINEL LYMPH NODE BIOPSY AND PORT REMOVAL;  Surgeon: Veronica Gan MD;  Location: [facility];  Service:     BIOPSY BREAST Left 2014    BREAST EXCISIONAL BIOPSY Left     years ago    COLONOSCOPY      FOOT ARTHRODESIS, MODIFIED CHAVEZ  may 2013 and oct 2013    hard to get hardware removed due to allergy to metal     FOOT SURGERY Right 2013    LAPAROSCOPIC HYSTERECTOMY TOTAL Bilateral 11/14/2018    Procedure: TOTAL LAPAROSCOPIC HYSTERECTOMY, BILATERAL SALPINGO-OOPHORECTOMY;  Surgeon: Avni Deng MD;  Location: [facility];  Service: Gynecology    LUMPECTOMY BREAST Left 2014    LUMPECTOMY BREAST Right 2018    OTHER SURGICAL  "HISTORY      port a cath    POLYPECTOMY  2009    Uterine polyp removed     RI RMVL DAREK CTR VAD W/SUBQ PORT/ CTR/PRPH INSJ N/A 2/12/2018    Procedure: PORT REMOVAL;  Surgeon: Veronica Gan MD;  Location: [facility];  Service: General       Social History:   reports that she has never smoked. She has never used smokeless tobacco. She reports current alcohol use. She reports that she does not use drugs.    Family History:  Family History   Problem Relation Age of Onset    Ovarian Cancer Maternal Grandmother 60.00        biopsy showed adenocarcinoma, suspected ovarian primary    Colon Cancer Maternal Grandfather 65.00    Breast Cancer Cousin 50.00        maternal first-cousin, triple-negative    Hypertension Mother     Dementia Mother     Diabetes Father     Hypertension Father     Pneumonia Father     Hypertension Sister     Breast Cancer Sister 69.00    Uterine Cancer Sister 69.00    Diabetes Brother     Hypertension Brother     Hypertension Paternal Grandmother     Cerebrovascular Disease Paternal Grandmother     Stomach Cancer Paternal Grandfather 70.00    Hypertension Brother     Diabetes Brother     Hypertension Sister     Colon Cancer Maternal Aunt 75.00    Prostate Cancer Maternal Uncle     Diabetes Brother     Colon Polyps Brother         starting in his 20s    Hypertension Son     Asthma Son     Thyroid Cancer Cousin 57.00        maternal first-cousin, Hurthle cell    Cancer Other         \"female\" cancers among relatives to [facility] [place] but specifics not known    Skin Cancer Cousin         paternal first-cousin    Cancer Cousin         paternal first-cousin, unknown type    Uterine Cancer Other 50.00        daughter to paternal first-cousin    Cancer Cousin         paternal first-cousin, head/neck       Medications:  Current Outpatient Medications   Medication Sig Dispense Refill    acetaminophen (TYLENOL) 325 MG tablet Take 650 mg by mouth as needed for mild pain      albuterol (PROAIR HFA;PROVENTIL " HFA;VENTOLIN HFA) 90 mcg/actuation inhaler [ALBUTEROL (PROAIR HFA;PROVENTIL HFA;VENTOLIN HFA) 90 MCG/ACTUATION INHALER] Inhale 2 puffs every 4 (four) hours as needed for wheezing.      amoxicillin-clavulanate (AUGMENTIN) 875-125 MG tablet 1 tablet Orally every 12 hrs for 7 days      Cholecalciferol (VITAMIN D3 PO) Take 1 capsule by mouth daily      cyanocobalamin (VITAMIN B-12) 1000 MCG tablet Take 1,000 mcg by mouth daily      fexofenadine (ALLEGRA) 180 MG tablet Take 180 mg by mouth as needed      folic acid (FOLVITE) 1 MG tablet Take 1 tablet (1 mg) by mouth daily. 30 tablet 6    ibuprofen (ADVIL/MOTRIN) 200 MG tablet Take 400 mg by mouth as needed for pain      methotrexate 2.5 MG tablet Take 10 tablets (25 mg) by mouth every 7 days. 40 tablet 6    montelukast (SINGULAIR) 10 MG tablet Take 1 tablet by mouth daily      multivitamin therapeutic (THERAGRAN) tablet Take 1 tablet by mouth every evening      omeprazole (PRILOSEC) 20 MG DR capsule Take 20 mg by mouth daily      tacrolimus (PROTOPIC) 0.1 % external ointment Apply topically 2 times daily. 100 g 6    Triamcinolone Acetonide (NASACORT ALLERGY 24HR NA) Spray 1-2 sprays in nostril as needed.       Allergies   Allergen Reactions    Bees Unknown    Codeine Nausea    Cortisone (Hydrocortisone) [Hydrocortisone] Hives     Injection    Cyclophosphamide Unknown    Latex Other (See Comments)     intolerance    Nystatin Unknown    Other Environmental Allergy Unknown     Seasonal Allergies, Mold     Prochlorperazine Nausea and Vomiting    Pulmicort [Budesonide] Hives    Nickel Rash    Other Drug Allergy (See Comments) Rash     White Gold    Triamcinolone Rash    Zantac [Ranitidine] Nausea and Vomiting    Zofran (As Hydrochloride) [Ondansetron] Nausea and Vomiting

## 2025-04-03 NOTE — TELEPHONE ENCOUNTER
Discussed further at visit on 3/27/25 with Dr. Banda.     Clementina Gatica, [facility]

## 2025-04-21 ENCOUNTER — TELEPHONE (OUTPATIENT)
Dept: ONCOLOGY | Facility: HOSPITAL | Age: 70
End: 2025-04-21
Payer: COMMERCIAL

## 2025-04-21 NOTE — TELEPHONE ENCOUNTER
I received a phone call today from Willa.  She is calling to see how often she should have ultrasounds.  She states that she is no longer able to have mammograms and can only have ultrasounds to assess for breast recurrence.  She states her last one was in September.  She is unsure if she should have these every 6 months or every year.  She would like a call back at [phone].  I let her know I get this question over to Dr. Dee and her RN care coordinator, Belle.    Aide Melendez, RN on 4/21/2025 at 9:15 AM

## 2025-04-23 ENCOUNTER — PATIENT OUTREACH (OUTPATIENT)
Dept: ONCOLOGY | Facility: HOSPITAL | Age: 70
End: 2025-04-23
Payer: COMMERCIAL

## 2025-04-23 NOTE — PROGRESS NOTES
[facility]: Cancer Care                                                                                          Writer sent patient augustohart message:    Shivam Crouch,    I reviewed your question with Dr. Dee about how often you should be having your breast ultrasounds to assess for breast cancer recurrence. Per Dr. Dee, you should get the breast ultrasounds yearly. When it gets closer to September, our schedulers will give you a call to get that ultrasound scheduled along with a follow up visit with Dr. Dee. Please let me know if you have any questions.     Signature:  Belle Valladares RN

## 2025-07-02 ENCOUNTER — LAB REQUISITION (OUTPATIENT)
Dept: LAB | Facility: CLINIC | Age: 70
End: 2025-07-02

## 2025-07-02 DIAGNOSIS — E04.1 NONTOXIC SINGLE THYROID NODULE: ICD-10-CM

## 2025-07-02 DIAGNOSIS — E78.2 MIXED HYPERLIPIDEMIA: ICD-10-CM

## 2025-07-02 DIAGNOSIS — R10.32 LEFT LOWER QUADRANT PAIN: ICD-10-CM

## 2025-07-02 DIAGNOSIS — E55.9 VITAMIN D DEFICIENCY, UNSPECIFIED: ICD-10-CM

## 2025-07-02 PROCEDURE — 82310 ASSAY OF CALCIUM: CPT | Performed by: FAMILY MEDICINE

## 2025-07-02 PROCEDURE — 84443 ASSAY THYROID STIM HORMONE: CPT | Performed by: FAMILY MEDICINE

## 2025-07-02 PROCEDURE — 84478 ASSAY OF TRIGLYCERIDES: CPT | Performed by: FAMILY MEDICINE

## 2025-07-02 PROCEDURE — 82652 VIT D 1 25-DIHYDROXY: CPT | Performed by: FAMILY MEDICINE

## 2025-07-03 LAB
1,25(OH)2D SERPL-MCNC: 45.4 PG/ML (ref 19.9–79.3)
ALBUMIN SERPL BCG-MCNC: 3.9 G/DL (ref 3.5–5.2)
ALP SERPL-CCNC: 118 U/L (ref 40–150)
ALT SERPL W P-5'-P-CCNC: 8 U/L (ref 0–50)
ANION GAP SERPL CALCULATED.3IONS-SCNC: 13 MMOL/L (ref 7–15)
AST SERPL W P-5'-P-CCNC: 28 U/L (ref 0–45)
BILIRUB SERPL-MCNC: <0.2 MG/DL
BUN SERPL-MCNC: 15.8 MG/DL (ref 8–23)
CALCIUM SERPL-MCNC: 9.4 MG/DL (ref 8.8–10.4)
CHLORIDE SERPL-SCNC: 103 MMOL/L (ref 98–107)
CHOLEST SERPL-MCNC: 174 MG/DL
CREAT SERPL-MCNC: 0.84 MG/DL (ref 0.51–0.95)
EGFRCR SERPLBLD CKD-EPI 2021: 74 ML/MIN/1.73M2
FASTING STATUS PATIENT QL REPORTED: NO
FASTING STATUS PATIENT QL REPORTED: NO
GLUCOSE SERPL-MCNC: 105 MG/DL (ref 70–99)
HCO3 SERPL-SCNC: 24 MMOL/L (ref 22–29)
HDLC SERPL-MCNC: 52 MG/DL
LDLC SERPL CALC-MCNC: 98 MG/DL
NONHDLC SERPL-MCNC: 122 MG/DL
POTASSIUM SERPL-SCNC: 4.2 MMOL/L (ref 3.4–5.3)
PROT SERPL-MCNC: 7.4 G/DL (ref 6.4–8.3)
SODIUM SERPL-SCNC: 140 MMOL/L (ref 135–145)
TRIGL SERPL-MCNC: 119 MG/DL
TSH SERPL DL<=0.005 MIU/L-ACNC: 1.08 UIU/ML (ref 0.3–4.2)

## 2025-07-09 ENCOUNTER — HOSPITAL ENCOUNTER (OUTPATIENT)
Dept: ULTRASOUND IMAGING | Facility: HOSPITAL | Age: 70
Discharge: HOME | End: 2025-07-09
Attending: FAMILY MEDICINE
Payer: COMMERCIAL

## 2025-07-09 DIAGNOSIS — R10.32 LEFT INGUINAL PAIN: ICD-10-CM

## 2025-07-09 PROCEDURE — 76857 US EXAM PELVIC LIMITED: CPT

## 2025-08-07 DIAGNOSIS — Z17.1 MALIGNANT NEOPLASM OF UPPER-OUTER QUADRANT OF RIGHT BREAST IN FEMALE, ESTROGEN RECEPTOR NEGATIVE (H): Primary | ICD-10-CM

## 2025-08-07 DIAGNOSIS — L94.0 MORPHEA: ICD-10-CM

## 2025-08-07 DIAGNOSIS — C50.411 MALIGNANT NEOPLASM OF UPPER-OUTER QUADRANT OF RIGHT BREAST IN FEMALE, ESTROGEN RECEPTOR NEGATIVE (H): Primary | ICD-10-CM

## 2025-08-11 ENCOUNTER — OFFICE VISIT (OUTPATIENT)
Dept: SURGERY | Facility: CLINIC | Age: 70
End: 2025-08-11
Attending: INTERNAL MEDICINE
Payer: COMMERCIAL

## 2025-08-11 ENCOUNTER — ANCILLARY PROCEDURE (OUTPATIENT)
Dept: MAMMOGRAPHY | Facility: CLINIC | Age: 70
End: 2025-08-11
Attending: SURGERY
Payer: COMMERCIAL

## 2025-08-11 VITALS — HEIGHT: 61 IN | WEIGHT: 166 LBS | BODY MASS INDEX: 31.34 KG/M2

## 2025-08-11 DIAGNOSIS — L94.0 MORPHEA: ICD-10-CM

## 2025-08-11 DIAGNOSIS — Z17.1 MALIGNANT NEOPLASM OF UPPER-OUTER QUADRANT OF RIGHT BREAST IN FEMALE, ESTROGEN RECEPTOR NEGATIVE (H): ICD-10-CM

## 2025-08-11 DIAGNOSIS — Z12.31 ENCOUNTER FOR SCREENING MAMMOGRAM FOR BREAST CANCER: ICD-10-CM

## 2025-08-11 DIAGNOSIS — Z12.31 ENCOUNTER FOR SCREENING MAMMOGRAM FOR BREAST CANCER: Primary | ICD-10-CM

## 2025-08-11 DIAGNOSIS — C50.411 MALIGNANT NEOPLASM OF UPPER-OUTER QUADRANT OF RIGHT BREAST IN FEMALE, ESTROGEN RECEPTOR NEGATIVE (H): ICD-10-CM

## 2025-08-11 PROCEDURE — 99215 OFFICE O/P EST HI 40 MIN: CPT | Performed by: SURGERY

## 2025-08-11 PROCEDURE — G0463 HOSPITAL OUTPT CLINIC VISIT: HCPCS | Performed by: SURGERY

## 2025-08-11 PROCEDURE — 77063 BREAST TOMOSYNTHESIS BI: CPT

## 2025-08-11 RX ORDER — PANTOPRAZOLE SODIUM 20 MG/1
TABLET, DELAYED RELEASE ORAL
COMMUNITY